# Patient Record
Sex: MALE | Race: BLACK OR AFRICAN AMERICAN | Employment: FULL TIME | URBAN - METROPOLITAN AREA
[De-identification: names, ages, dates, MRNs, and addresses within clinical notes are randomized per-mention and may not be internally consistent; named-entity substitution may affect disease eponyms.]

---

## 2022-12-03 ENCOUNTER — APPOINTMENT (OUTPATIENT)
Dept: GENERAL RADIOLOGY | Age: 52
End: 2022-12-03
Attending: EMERGENCY MEDICINE

## 2022-12-03 ENCOUNTER — HOSPITAL ENCOUNTER (EMERGENCY)
Age: 52
Discharge: HOME | End: 2022-12-03
Attending: EMERGENCY MEDICINE

## 2022-12-03 VITALS
SYSTOLIC BLOOD PRESSURE: 186 MMHG | TEMPERATURE: 97.4 F | HEART RATE: 91 BPM | OXYGEN SATURATION: 99 % | DIASTOLIC BLOOD PRESSURE: 117 MMHG | RESPIRATION RATE: 16 BRPM
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
## 2022-12-03 DIAGNOSIS — T07.XXXA MULTIPLE CONTUSIONS: ICD-10-CM

## 2022-12-03 PROCEDURE — 99283 EMERGENCY DEPT VISIT LOW MDM: CPT

## 2022-12-03 PROCEDURE — 71101 X-RAY EXAM UNILAT RIBS/CHEST: CPT

## 2022-12-03 PROCEDURE — 73562 X-RAY EXAM OF KNEE 3: CPT

## 2022-12-03 PROCEDURE — 74011250637 HC RX REV CODE- 250/637: Performed by: EMERGENCY MEDICINE

## 2022-12-03 RX ORDER — IBUPROFEN 800 MG/1
800 TABLET ORAL
Qty: 20 TABLET | Refills: 0 | Status: SHIPPED | OUTPATIENT
Start: 2022-12-03 | End: 2022-12-10

## 2022-12-03 RX ORDER — HYDROCODONE BITARTRATE AND ACETAMINOPHEN 5; 325 MG/1; MG/1
1 TABLET ORAL ONCE
Status: COMPLETED | OUTPATIENT
Start: 2022-12-03 | End: 2022-12-03

## 2022-12-03 RX ORDER — HYDROCODONE BITARTRATE AND ACETAMINOPHEN 5; 325 MG/1; MG/1
1 TABLET ORAL
Qty: 10 TABLET | Refills: 0 | Status: SHIPPED | OUTPATIENT
Start: 2022-12-03 | End: 2022-12-06

## 2022-12-03 RX ORDER — IBUPROFEN 400 MG/1
800 TABLET ORAL ONCE
Status: COMPLETED | OUTPATIENT
Start: 2022-12-03 | End: 2022-12-03

## 2022-12-03 RX ADMIN — IBUPROFEN 800 MG: 400 TABLET, FILM COATED ORAL at 09:08

## 2022-12-03 RX ADMIN — HYDROCODONE BITARTRATE AND ACETAMINOPHEN 1 TABLET: 5; 325 TABLET ORAL at 09:08
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
## 2022-12-03 NOTE — Clinical Note
[address] [place] [address] Dr GEN[facility] EMERGENCY DEPT  [id number] [address] [phone]    Work/School Note    Date: 12/3/2022    To Whom It May concern:    Libia Short was seen and treated today in the emergency room by the following provider(s):  Attending Provider: Marjorie Villanueva MD.      Libia Short is excused from work/school on 12/03/22 and 12/04/22. He is medically clear to return to work/school on 12/5/2022.        Sincerely,          Francine Velasquez MD
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
## 2023-02-06 ENCOUNTER — APPOINTMENT (OUTPATIENT)
Dept: CT IMAGING | Age: 53
DRG: 308 | End: 2023-02-06
Attending: EMERGENCY MEDICINE
Payer: COMMERCIAL

## 2023-02-06 ENCOUNTER — HOSPITAL ENCOUNTER (INPATIENT)
Age: 53
LOS: 2 days | Discharge: HOME | DRG: 308 | End: 2023-02-08
Attending: EMERGENCY MEDICINE | Admitting: FAMILY MEDICINE
Payer: COMMERCIAL

## 2023-02-06 ENCOUNTER — APPOINTMENT (OUTPATIENT)
Dept: GENERAL RADIOLOGY | Age: 53
DRG: 308 | End: 2023-02-06
Attending: EMERGENCY MEDICINE
Payer: COMMERCIAL

## 2023-02-06 ENCOUNTER — OFFICE VISIT (OUTPATIENT)
Dept: URGENT CARE | Age: 53
End: 2023-02-06
Payer: COMMERCIAL

## 2023-02-06 VITALS
HEART RATE: 151 BPM | BODY MASS INDEX: 33 KG/M2 | OXYGEN SATURATION: 97 % | DIASTOLIC BLOOD PRESSURE: 111 MMHG | SYSTOLIC BLOOD PRESSURE: 145 MMHG | TEMPERATURE: 97.9 F | WEIGHT: 249 LBS | RESPIRATION RATE: 16 BRPM | HEIGHT: 73 IN

## 2023-02-06 DIAGNOSIS — I48.92 ATRIAL FLUTTER, UNSPECIFIED TYPE (HCC): Primary | ICD-10-CM

## 2023-02-06 DIAGNOSIS — I48.92 ATRIAL FLUTTER BY ELECTROCARDIOGRAM (HCC): Primary | ICD-10-CM

## 2023-02-06 LAB
ALBUMIN SERPL-MCNC: 4.5 G/DL (ref 3.5–5)
ALBUMIN/GLOB SERPL: 1.2 (ref 1.1–2.2)
ALP SERPL-CCNC: 64 U/L (ref 45–117)
ALT SERPL-CCNC: 96 U/L (ref 12–78)
AMPHET UR QL SCN: NEGATIVE
ANION GAP SERPL CALC-SCNC: 6 MMOL/L (ref 5–15)
AST SERPL-CCNC: 64 U/L (ref 15–37)
BARBITURATES UR QL SCN: NEGATIVE
BASOPHILS # BLD: 0 K/UL (ref 0–0.1)
BASOPHILS NFR BLD: 1 % (ref 0–1)
BENZODIAZ UR QL: NEGATIVE
BILIRUB SERPL-MCNC: 0.8 MG/DL (ref 0.2–1)
BNP SERPL-MCNC: 447 PG/ML
BUN SERPL-MCNC: 22 MG/DL (ref 6–20)
BUN/CREAT SERPL: 16 (ref 12–20)
CALCIUM SERPL-MCNC: 9.7 MG/DL (ref 8.5–10.1)
CANNABINOIDS UR QL SCN: NEGATIVE
CHLORIDE SERPL-SCNC: 109 MMOL/L (ref 97–108)
CO2 SERPL-SCNC: 24 MMOL/L (ref 21–32)
COCAINE UR QL SCN: NEGATIVE
COMMENT, HOLDF: NORMAL
COMMENT, HOLDF: NORMAL
CREAT SERPL-MCNC: 1.37 MG/DL (ref 0.7–1.3)
D DIMER PPP FEU-MCNC: 0.21 MG/L FEU (ref 0–0.65)
DIFFERENTIAL METHOD BLD: ABNORMAL
DRUG SCRN COMMENT,DRGCM: NORMAL
EOSINOPHIL # BLD: 0.1 K/UL (ref 0–0.4)
EOSINOPHIL NFR BLD: 3 % (ref 0–7)
ERYTHROCYTE [DISTWIDTH] IN BLOOD BY AUTOMATED COUNT: 14.5 % (ref 11.5–14.5)
GLOBULIN SER CALC-MCNC: 3.7 G/DL (ref 2–4)
GLUCOSE SERPL-MCNC: 131 MG/DL (ref 65–100)
HCT VFR BLD AUTO: 49.8 % (ref 36.6–50.3)
HGB BLD-MCNC: 17.2 G/DL (ref 12.1–17)
IMM GRANULOCYTES # BLD AUTO: 0 K/UL (ref 0–0.04)
IMM GRANULOCYTES NFR BLD AUTO: 0 % (ref 0–0.5)
LYMPHOCYTES # BLD: 1.3 K/UL (ref 0.8–3.5)
LYMPHOCYTES NFR BLD: 29 % (ref 12–49)
MAGNESIUM SERPL-MCNC: 2.2 MG/DL (ref 1.6–2.4)
MCH RBC QN AUTO: 29 PG (ref 26–34)
MCHC RBC AUTO-ENTMCNC: 34.5 G/DL (ref 30–36.5)
MCV RBC AUTO: 84 FL (ref 80–99)
METHADONE UR QL: NEGATIVE
MONOCYTES # BLD: 0.5 K/UL (ref 0–1)
MONOCYTES NFR BLD: 11 % (ref 5–13)
NEUTS SEG # BLD: 2.5 K/UL (ref 1.8–8)
NEUTS SEG NFR BLD: 56 % (ref 32–75)
NRBC # BLD: 0 K/UL (ref 0–0.01)
NRBC BLD-RTO: 0 PER 100 WBC
OPIATES UR QL: NEGATIVE
PCP UR QL: NEGATIVE
PLATELET # BLD AUTO: 161 K/UL (ref 150–400)
PMV BLD AUTO: 10.9 FL (ref 8.9–12.9)
POTASSIUM SERPL-SCNC: 3.6 MMOL/L (ref 3.5–5.1)
PROT SERPL-MCNC: 8.2 G/DL (ref 6.4–8.2)
RBC # BLD AUTO: 5.93 M/UL (ref 4.1–5.7)
SAMPLES BEING HELD,HOLD: NORMAL
SAMPLES BEING HELD,HOLD: NORMAL
SODIUM SERPL-SCNC: 139 MMOL/L (ref 136–145)
TROPONIN I SERPL HS-MCNC: 73 NG/L (ref 0–76)
TROPONIN I SERPL HS-MCNC: 84 NG/L (ref 0–76)
TSH SERPL DL<=0.05 MIU/L-ACNC: 2.74 UIU/ML (ref 0.36–3.74)
UR CULT HOLD, URHOLD: NORMAL
WBC # BLD AUTO: 4.5 K/UL (ref 4.1–11.1)

## 2023-02-06 PROCEDURE — 74011250636 HC RX REV CODE- 250/636: Performed by: EMERGENCY MEDICINE

## 2023-02-06 PROCEDURE — 85025 COMPLETE CBC W/AUTO DIFF WBC: CPT

## 2023-02-06 PROCEDURE — 74011000258 HC RX REV CODE- 258: Performed by: FAMILY MEDICINE

## 2023-02-06 PROCEDURE — 74011000636 HC RX REV CODE- 636: Performed by: RADIOLOGY

## 2023-02-06 PROCEDURE — 99285 EMERGENCY DEPT VISIT HI MDM: CPT

## 2023-02-06 PROCEDURE — 65660000001 HC RM ICU INTERMED STEPDOWN

## 2023-02-06 PROCEDURE — 74011250637 HC RX REV CODE- 250/637: Performed by: FAMILY MEDICINE

## 2023-02-06 PROCEDURE — 84443 ASSAY THYROID STIM HORMONE: CPT

## 2023-02-06 PROCEDURE — 83735 ASSAY OF MAGNESIUM: CPT

## 2023-02-06 PROCEDURE — 84484 ASSAY OF TROPONIN QUANT: CPT

## 2023-02-06 PROCEDURE — 85379 FIBRIN DEGRADATION QUANT: CPT

## 2023-02-06 PROCEDURE — 80307 DRUG TEST PRSMV CHEM ANLYZR: CPT

## 2023-02-06 PROCEDURE — 36415 COLL VENOUS BLD VENIPUNCTURE: CPT

## 2023-02-06 PROCEDURE — 74011000250 HC RX REV CODE- 250: Performed by: FAMILY MEDICINE

## 2023-02-06 PROCEDURE — 83880 ASSAY OF NATRIURETIC PEPTIDE: CPT

## 2023-02-06 PROCEDURE — 74011000250 HC RX REV CODE- 250: Performed by: INTERNAL MEDICINE

## 2023-02-06 PROCEDURE — 93005 ELECTROCARDIOGRAM TRACING: CPT

## 2023-02-06 PROCEDURE — 74011250636 HC RX REV CODE- 250/636: Performed by: INTERNAL MEDICINE

## 2023-02-06 PROCEDURE — 93000 ELECTROCARDIOGRAM COMPLETE: CPT | Performed by: FAMILY MEDICINE

## 2023-02-06 PROCEDURE — 71046 X-RAY EXAM CHEST 2 VIEWS: CPT

## 2023-02-06 PROCEDURE — 99204 OFFICE O/P NEW MOD 45 MIN: CPT | Performed by: FAMILY MEDICINE

## 2023-02-06 PROCEDURE — 74011000258 HC RX REV CODE- 258: Performed by: INTERNAL MEDICINE

## 2023-02-06 PROCEDURE — 71275 CT ANGIOGRAPHY CHEST: CPT

## 2023-02-06 PROCEDURE — 80053 COMPREHEN METABOLIC PANEL: CPT

## 2023-02-06 RX ORDER — SODIUM CHLORIDE 0.9 % (FLUSH) 0.9 %
5-40 SYRINGE (ML) INJECTION EVERY 8 HOURS
Status: DISCONTINUED | OUTPATIENT
Start: 2023-02-06 | End: 2023-02-08 | Stop reason: HOSPADM

## 2023-02-06 RX ORDER — POTASSIUM CHLORIDE 750 MG/1
20 TABLET, FILM COATED, EXTENDED RELEASE ORAL
Status: COMPLETED | OUTPATIENT
Start: 2023-02-06 | End: 2023-02-06

## 2023-02-06 RX ORDER — ONDANSETRON 4 MG/1
4 TABLET, ORALLY DISINTEGRATING ORAL
Status: DISCONTINUED | OUTPATIENT
Start: 2023-02-06 | End: 2023-02-08 | Stop reason: HOSPADM

## 2023-02-06 RX ORDER — ACETAMINOPHEN 650 MG/1
650 SUPPOSITORY RECTAL
Status: DISCONTINUED | OUTPATIENT
Start: 2023-02-06 | End: 2023-02-08 | Stop reason: HOSPADM

## 2023-02-06 RX ORDER — LISINOPRIL 10 MG/1
10 TABLET ORAL DAILY
COMMUNITY
End: 2023-02-08

## 2023-02-06 RX ORDER — ONDANSETRON 2 MG/ML
4 INJECTION INTRAMUSCULAR; INTRAVENOUS
Status: DISCONTINUED | OUTPATIENT
Start: 2023-02-06 | End: 2023-02-08 | Stop reason: HOSPADM

## 2023-02-06 RX ORDER — POLYETHYLENE GLYCOL 3350 17 G/17G
17 POWDER, FOR SOLUTION ORAL DAILY PRN
Status: DISCONTINUED | OUTPATIENT
Start: 2023-02-06 | End: 2023-02-08 | Stop reason: HOSPADM

## 2023-02-06 RX ORDER — ENOXAPARIN SODIUM 100 MG/ML
1 INJECTION SUBCUTANEOUS EVERY 12 HOURS
Status: DISCONTINUED | OUTPATIENT
Start: 2023-02-06 | End: 2023-02-07

## 2023-02-06 RX ORDER — LISINOPRIL 10 MG/1
10 TABLET ORAL DAILY
Status: DISCONTINUED | OUTPATIENT
Start: 2023-02-07 | End: 2023-02-08 | Stop reason: HOSPADM

## 2023-02-06 RX ORDER — ACETAMINOPHEN 325 MG/1
650 TABLET ORAL
Status: DISCONTINUED | OUTPATIENT
Start: 2023-02-06 | End: 2023-02-08 | Stop reason: HOSPADM

## 2023-02-06 RX ORDER — SODIUM CHLORIDE 0.9 % (FLUSH) 0.9 %
5-40 SYRINGE (ML) INJECTION AS NEEDED
Status: DISCONTINUED | OUTPATIENT
Start: 2023-02-06 | End: 2023-02-08 | Stop reason: HOSPADM

## 2023-02-06 RX ORDER — FUROSEMIDE 10 MG/ML
40 INJECTION INTRAMUSCULAR; INTRAVENOUS ONCE
Status: COMPLETED | OUTPATIENT
Start: 2023-02-06 | End: 2023-02-06

## 2023-02-06 RX ORDER — ALBUTEROL SULFATE 90 UG/1
2 AEROSOL, METERED RESPIRATORY (INHALATION)
COMMUNITY
Start: 2022-12-06 | End: 2023-12-06

## 2023-02-06 RX ADMIN — FUROSEMIDE 40 MG: 10 INJECTION, SOLUTION INTRAMUSCULAR; INTRAVENOUS at 16:03

## 2023-02-06 RX ADMIN — SODIUM CHLORIDE 1000 ML: 900 INJECTION, SOLUTION INTRAVENOUS at 11:10

## 2023-02-06 RX ADMIN — DILTIAZEM HYDROCHLORIDE 5 MG/HR: 5 INJECTION INTRAVENOUS at 22:30

## 2023-02-06 RX ADMIN — IOPAMIDOL 80 ML: 755 INJECTION, SOLUTION INTRAVENOUS at 11:52

## 2023-02-06 RX ADMIN — ACETAMINOPHEN 650 MG: 325 TABLET ORAL at 21:45

## 2023-02-06 RX ADMIN — DILTIAZEM HYDROCHLORIDE 2.5 MG/HR: 5 INJECTION INTRAVENOUS at 16:27

## 2023-02-06 RX ADMIN — POTASSIUM CHLORIDE 20 MEQ: 750 TABLET, FILM COATED, EXTENDED RELEASE ORAL at 16:04

## 2023-02-06 RX ADMIN — ENOXAPARIN SODIUM 120 MG: 100 INJECTION SUBCUTANEOUS at 16:04

## 2023-02-06 NOTE — PROGRESS NOTES
HISTORY OF PRESENT ILLNESS  Regina Veliz is a 46 y.o. male. Patient presents with 3 days of chest tightness shortness of breath. He says that he was washing his car yesterday when this started. He used his Apple Watch to tell him his HR was over 100 bpm, but unsure of what number exactly. He says that with rest he came back down. Waking up this morning he was experiencing the same symptoms. Of note, he was diagnosed with a thoracic aortic aneurysm in 2018, but he also says that he was told he does not have this problem as of 2 years ago. He underwent stress test in 2018, with normal results. He reports that he has never had a catheterization or seen a cardiologist outpatient. Review of Systems   Constitutional:  Negative for chills, diaphoresis and fever. HENT:  Negative for congestion and sinus pain. Respiratory:  Positive for cough and shortness of breath. Negative for sputum production and wheezing. Cardiovascular:  Positive for chest pain. Negative for palpitations, orthopnea, leg swelling and PND. Gastrointestinal:  Negative for nausea and vomiting. Physical Exam  Vitals and nursing note reviewed. Constitutional:       General: He is not in acute distress. Appearance: Normal appearance. He is obese. HENT:      Head: Normocephalic and atraumatic. Cardiovascular:      Rate and Rhythm: Regular rhythm. Tachycardia present. Pulses: Normal pulses. Heart sounds: Normal heart sounds. No murmur heard. Pulmonary:      Effort: Pulmonary effort is normal. No respiratory distress. Breath sounds: Normal breath sounds. Abdominal:      General: Abdomen is flat. Palpations: Abdomen is soft. Tenderness: There is no abdominal tenderness. Musculoskeletal:      Right lower leg: No edema. Left lower leg: No edema. Neurological:      Mental Status: He is alert. EKG shows a flutter with 2:1 conduction at a rate of 151 bpm.    ASSESSMENT and PLAN  1. Atrial flutter by electrocardiogram ([facility] [address]  A flutter with RVR on EKG x2. Pulse did not come down at any point during visit.  Recommended that patient proceed to ER immediately for stabilization and evaluation.  - AMB POC EKG ROUTINE W/ 12 LEADS, INTER & REP

## 2023-02-06 NOTE — CONSULTS
Cardiology Consult Note    CC: CP/palpitations  Reason for consult:  Siddhartha Parker with RVR    Requesting MD:  Dr. Sharma Headings     Subjective:      Date of  Admission: 2/6/2023 11:40 AM     Admission type:Emergency    Mary Ann Segovia is a 46 y.o. male was seen in ER for tachycardia. He states he has had elevated HR and sensation of palpitation with just slight activity. He has felt like this over last two weeks. With tachycardia he has felt tired and easily fatigued. He also feels like he needs to take a deep breath and noted abdominal distention. He feels like his lungs are congested as well. He denies any prior history of PAF, CHF, Mi, CAD, or stent. He has HTN but he is not taking regimen all the time. He just found a PCP whom he will see end of this month. There are no problems to display for this patient. PMhx; HTN    Shx; negative for smoking or ETOH; he works at [employer]    Fhx; positive for HTN in mom     No past surgical history on file. Allergies   Allergen Reactions    Shellfish Derived Anaphylaxis      No family history on file. Current Facility-Administered Medications   Medication Dose Route Frequency    furosemide (LASIX) injection 40 mg  40 mg IntraVENous ONCE    enoxaparin (LOVENOX) injection 120 mg  1 mg/kg SubCUTAneous Q12H    dilTIAZem (CARDIZEM) 125 mg in dextrose 5% 125 mL infusion  0-15 mg/hr IntraVENous TITRATE     Current Outpatient Medications   Medication Sig    albuterol (PROVENTIL HFA, VENTOLIN HFA, PROAIR HFA) 90 mcg/actuation inhaler Take 2 Puffs by inhalation every four (4) hours as needed. lisinopriL (PRINIVIL, ZESTRIL) 10 mg tablet Take 10 mg by mouth daily. Prior to Admission Medications:  Prior to Admission medications    Medication Sig Start Date End Date Taking? Authorizing Provider   albuterol (PROVENTIL HFA, VENTOLIN HFA, PROAIR HFA) 90 mcg/actuation inhaler Take 2 Puffs by inhalation every four (4) hours as needed.  12/6/22 12/6/23  Provider, Historical lisinopriL (PRINIVIL, ZESTRIL) 10 mg tablet Take 10 mg by mouth daily. Provider, Historical        Review of Symptoms:  Except as noted in HPI, patient denies recent fever or chills, nausea, vomiting, diarrhea, hemoptysis, hematemesis, dysuria, myalgias, focal neurologic symptoms, ecchymosis, angioedema, odynophagia, dysphagia, sore throat, earache,rash, melena, hematochezia, depression, GERD, cold intolerance, petechia, bleeding gums, or significant weight loss. A comprehensive review of systems was negative except for that written in the HPI. Subjective:    24 hr VS reviewed, overall VSSAF  Temp (24hrs), Av.9 °F (36.6 °C), Min:97.9 °F (36.6 °C), Max:97.9 °F (36.6 °C)    Patient Vitals for the past 8 hrs:   Pulse   23 1501 (!) 112   23 1457 (!) 138   23 1447 (!) 149   23 1443 (!) 127   23 1422 (!) 148   23 1352 (!) 151   23 1307 (!) 154   23 1222 (!) 153   23 1215 (!) 151   23 0943 (!) 154    Patient Vitals for the past 8 hrs:   Resp   23 1501 (!) 5   23 1457 24   23 1447 13   23 1443 12   23 1422 13   23 1352 20   23 1307 20   23 1222 20   23 1215 24   23 0943 20    Patient Vitals for the past 8 hrs:   BP   23 1457 (!) 121/101   23 1447 (!) 126/100   23 1443 (!) 123/90   23 1222 111/87   23 1215 (!) 140/108   23 0943 (!) 165/112          Intake/Output Summary (Last 24 hours) at 2023 1516  Last data filed at 2023 1423  Gross per 24 hour   Intake 1000 ml   Output --   Net 1000 ml         Physical Exam (complete single organ system exam)      Visit Vitals  BP (!) 121/101   Pulse (!) 112   Temp 97.9 °F (36.6 °C)   Resp (!) 5   SpO2 97%     General Appearance:  Well developed, well nourished,alert and oriented x 3, and individual in no acute distress. Ears/Nose/Mouth/Throat:   Hearing grossly normal.         Neck: Supple.    Chest:   Lungs with slight rales   Cardiovascular:  Regular rate and rhythm, tachycardic, S1, S2 normal, no murmur. Abdomen:   Soft, non-tender, bowel sounds are active. Extremities: No edema bilaterally. Skin: Warm and dry. Cardiographics    Telemetry: Aflutter with RVR  ECG: Aflutter with 2:1 AVB  Echocardiogram: Not done    Labs:   Recent Results (from the past 24 hour(s))   CBC WITH AUTOMATED DIFF    Collection Time: 02/06/23  9:58 AM   Result Value Ref Range    WBC 4.5 4.1 - 11.1 K/uL    RBC 5.93 (H) 4.10 - 5.70 M/uL    HGB 17.2 (H) 12.1 - 17.0 g/dL    HCT 49.8 36.6 - 50.3 %    MCV 84.0 80.0 - 99.0 FL    MCH 29.0 26.0 - 34.0 PG    MCHC 34.5 30.0 - 36.5 g/dL    RDW 14.5 11.5 - 14.5 %    PLATELET 324 667 - 437 K/uL    MPV 10.9 8.9 - 12.9 FL    NRBC 0.0 0  WBC    ABSOLUTE NRBC 0.00 0.00 - 0.01 K/uL    NEUTROPHILS 56 32 - 75 %    LYMPHOCYTES 29 12 - 49 %    MONOCYTES 11 5 - 13 %    EOSINOPHILS 3 0 - 7 %    BASOPHILS 1 0 - 1 %    IMMATURE GRANULOCYTES 0 0.0 - 0.5 %    ABS. NEUTROPHILS 2.5 1.8 - 8.0 K/UL    ABS. LYMPHOCYTES 1.3 0.8 - 3.5 K/UL    ABS. MONOCYTES 0.5 0.0 - 1.0 K/UL    ABS. EOSINOPHILS 0.1 0.0 - 0.4 K/UL    ABS. BASOPHILS 0.0 0.0 - 0.1 K/UL    ABS. IMM. GRANS. 0.0 0.00 - 0.04 K/UL    DF AUTOMATED     METABOLIC PANEL, COMPREHENSIVE    Collection Time: 02/06/23  9:58 AM   Result Value Ref Range    Sodium 139 136 - 145 mmol/L    Potassium 3.6 3.5 - 5.1 mmol/L    Chloride 109 (H) 97 - 108 mmol/L    CO2 24 21 - 32 mmol/L    Anion gap 6 5 - 15 mmol/L    Glucose 131 (H) 65 - 100 mg/dL    BUN 22 (H) 6 - 20 MG/DL    Creatinine 1.37 (H) 0.70 - 1.30 MG/DL    BUN/Creatinine ratio 16 12 - 20      eGFR >60 >60 ml/min/1.73m2    Calcium 9.7 8.5 - 10.1 MG/DL    Bilirubin, total 0.8 0.2 - 1.0 MG/DL    ALT (SGPT) 96 (H) 12 - 78 U/L    AST (SGOT) 64 (H) 15 - 37 U/L    Alk.  phosphatase 64 45 - 117 U/L    Protein, total 8.2 6.4 - 8.2 g/dL    Albumin 4.5 3.5 - 5.0 g/dL    Globulin 3.7 2.0 - 4.0 g/dL    A-G Ratio 1. 2 1.1 - 2.2     TROPONIN-HIGH SENSITIVITY    Collection Time: 02/06/23  9:58 AM   Result Value Ref Range    Troponin-High Sensitivity 73 0 - 76 ng/L   MAGNESIUM    Collection Time: 02/06/23  9:58 AM   Result Value Ref Range    Magnesium 2.2 1.6 - 2.4 mg/dL   SAMPLES BEING HELD    Collection Time: 02/06/23  9:58 AM   Result Value Ref Range    SAMPLES BEING HELD 1red     COMMENT        Add-on orders for these samples will be processed based on acceptable specimen integrity and analyte stability, which may vary by analyte. NT-PRO BNP    Collection Time: 02/06/23  9:58 AM   Result Value Ref Range    NT pro- (H) <125 PG/ML   TSH 3RD GENERATION    Collection Time: 02/06/23  9:58 AM   Result Value Ref Range    TSH 2.74 0.36 - 3.74 uIU/mL   D DIMER    Collection Time: 02/06/23  9:58 AM   Result Value Ref Range    D-dimer 0.21 0.00 - 0.65 mg/L FEU   TROPONIN-HIGH SENSITIVITY    Collection Time: 02/06/23 12:33 PM   Result Value Ref Range    Troponin-High Sensitivity 84 (H) 0 - 76 ng/L   SAMPLES BEING HELD    Collection Time: 02/06/23 12:47 PM   Result Value Ref Range    SAMPLES BEING HELD 1PST     COMMENT        Add-on orders for these samples will be processed based on acceptable specimen integrity and analyte stability, which may vary by analyte. URINE CULTURE HOLD SAMPLE    Collection Time: 02/06/23 12:49 PM    Specimen: Urine   Result Value Ref Range    Urine culture hold        Urine on hold in Microbiology dept for 2 days. If unpreserved urine is submitted, it cannot be used for addtional testing after 24 hours, recollection will be required.    DRUG SCREEN, URINE    Collection Time: 02/06/23 12:49 PM   Result Value Ref Range    AMPHETAMINES Negative NEG      BARBITURATES Negative NEG      BENZODIAZEPINES Negative NEG      COCAINE Negative NEG      METHADONE Negative NEG      OPIATES Negative NEG      PCP(PHENCYCLIDINE) Negative NEG      THC (TH-CANNABINOL) Negative NEG      Drug screen comment (NOTE) Assessment:     Assessment:   Tachycardia;  Aflutter with 2:1 AVB  Aflutter; duration for two weeks  SOB; he is mild acute diastolic HF due to tachycardia  JHTN; not controlled      Plan:   Tele  IV Cardizem  Lovenox  Echo   Serial enzyme    Susie Collier MD

## 2023-02-06 NOTE — H&P
History and Physical    Date of Service:  2/6/2023  Primary Care Provider: None  Source of information: The patient and Chart review    Chief Complaint: Chest Pain      History of Presenting Illness:   Bryanna Castillo is a 46 y.o. male who presents with shortness of breath as well as chest pain for about a week. Patient was also noted to have sensation of palpitation even with slight activities. Patient was recently seen at an urgent care with similar symptoms several weeks ago and was diagnosed with bronchitis and treated. Patient with ongoing symptoms and was evaluated by PCP today, and subsequently sent to the emergency room. Patient subsequently presented to the emergency room. Patient with normal first troponin is slightly elevated second troponin. On further evaluation, patient was noted to be in a flutter with RVR. CTA of the chest was negative. Patient received one-time dose of Lasix as well as started on Cardizem drip. Cardiology has evaluated the patient admitted for further evaluation management. The patient denies any headache, blurry vision, sore throat, trouble swallowing, trouble with speech, chest pain, SOB, cough, fever, chills, N/V/D, abd pain, urinary symptoms, constipation, recent travels, sick contacts, focal or generalized neurological symptoms, falls, injuries, rashes, contact with COVID-19 diagnosed patients, hematemesis, melena, hemoptysis, hematuria, rashes, denies starting any new medications and denies any other concerns or problems besides as mentioned above. REVIEW OF SYSTEMS:  A comprehensive review of systems was negative except for that written in the History of Present Illness. No past medical history on file. No past surgical history on file. Prior to Admission medications    Medication Sig Start Date End Date Taking?  Authorizing Provider   albuterol (PROVENTIL HFA, VENTOLIN HFA, PROAIR HFA) 90 mcg/actuation inhaler Take 2 Puffs by inhalation every four (4) hours as needed. 12/6/22 12/6/23  Provider, Historical   lisinopriL (PRINIVIL, ZESTRIL) 10 mg tablet Take 10 mg by mouth daily. Provider, Historical     Allergies   Allergen Reactions    Shellfish Derived Anaphylaxis      No family history on file. Social History:  reports that he has never smoked. He has never used smokeless tobacco.   Social Determinants of Health     Tobacco Use: Low Risk     Smoking Tobacco Use: Never    Smokeless Tobacco Use: Never    Passive Exposure: Not on file   Alcohol Use: Not on file   Financial Resource Strain: Not on file   Food Insecurity: Not on file   Transportation Needs: Not on file   Physical Activity: Not on file   Stress: Not on file   Social Connections: Not on file   Intimate Partner Violence: Not on file   Depression: Not on file   Housing Stability: Not on file        Medications were reconciled to the best of my ability given all available resources at the time of admission. Route is PO if not otherwise noted. Family and social history were personally reviewed, all pertinent and relevant details are outlined as above.     Objective:   Visit Vitals  BP (!) 121/101   Pulse (!) 112   Temp 97.9 °F (36.6 °C)   Resp (!) 5   SpO2 97%      O2 Device: None (Room air)    PHYSICAL EXAM:   General: Alert x oriented x 3, awake, no acute distress,   HEENT: PEERL, EOMI, moist mucus membranes  Neck: Supple, no JVD, no meningeal signs  Chest: Clear to auscultation bilaterally   CVS: RRR, S1 S2 heard, no murmurs/rubs/gallops  Abd: Soft, non-tender, non-distended, +bowel sounds   Ext: No clubbing, no cyanosis, no edema  Neuro/Psych: Pleasant mood and affect, CN 2-12 grossly intact, sensory grossly within normal limit, Strength 5/5 in all extremities, DTR 1+ x 4  Cap refill: Brisk, less than 3 seconds  Pulses: 2+, symmetric in all extremities  Skin: Warm, dry, without rashes or lesions    Data Review:   I have independently reviewed and interpreted patient's lab and all other diagnostic data    Abnormal Labs Reviewed   CBC WITH AUTOMATED DIFF - Abnormal; Notable for the following components:       Result Value    RBC 5.93 (*)     HGB 17.2 (*)     All other components within normal limits   METABOLIC PANEL, COMPREHENSIVE - Abnormal; Notable for the following components:    Chloride 109 (*)     Glucose 131 (*)     BUN 22 (*)     Creatinine 1.37 (*)     ALT (SGPT) 96 (*)     AST (SGOT) 64 (*)     All other components within normal limits   NT-PRO BNP - Abnormal; Notable for the following components:    NT pro- (*)     All other components within normal limits   TROPONIN-HIGH SENSITIVITY - Abnormal; Notable for the following components:    Troponin-High Sensitivity 84 (*)     All other components within normal limits       All Micro Results       Procedure Component Value Units Date/Time    URINE CULTURE HOLD SAMPLE [[id number]] Collected: 02/06/23 1249    Order Status: Completed Specimen: Urine Updated: 02/06/23 1255     Urine culture hold       Urine on hold in Microbiology dept for 2 days. If unpreserved urine is submitted, it cannot be used for addtional testing after 24 hours, recollection will be required. IMAGING:   CTA CHEST W OR W WO CONT   Final Result   There is no pulmonary embolism. There is no aortic aneurysm or dissection. No acute intrathoracic process is identified. Incidental findings are as   described above. XR CHEST PA LAT   Final Result   No acute process. ECG/ECHO:    Results for orders placed or performed in visit on 02/06/23   AMB POC EKG ROUTINE W/ 12 LEADS, INTER & REP    Narrative    complete          Notes reviewed from all clinical/nonclinical/nursing services involved in patient's clinical care. Care coordination discussions were held with appropriate clinical/nonclinical/ nursing providers based on care coordination needs.      Assessment:   Given the patient's current clinical presentation, there is a high level of concern for decompensation if discharged from the emergency department. Complex decision making was performed, which includes reviewing the patient's available past medical records, laboratory results, and imaging studies. Active Problems:    Atrial flutter ([address] (2/6/2023)        Plan:     Atrial fibrillation with RVR  -New onset atrial fibrillation with RVR, will admit for further evaluation  -TSH within normal limits, optimize electrolytes  -Continue Cardizem drip, continue anticoagulation with Lovenox 1 mg/kg subcu every 12 hours  -Check echocardiogram, cardiology evaluating, consideration for ACE cardioversion pending clinical course    Mild elevated troponin likely demand ischemia  -This is likely demand ischemia in the setting of tachycardia  -Continue monitor, cardiology following    Hypertensive urgency  -Elevated blood pressure of 165/112 on presentation  -Cardizem drip has been started for a flutter RVR, resume lisinopril from home  -Monitor blood pressure    Abnormal creatinine  -No previous baseline renal function  -Monitor    Dyspnea  -This is likely induced by tachycardia  -CTA negative for PE or other acute pathology and patient is not hypoxic    Estimated length of stay is 2 midnights. DIET: No diet orders on file   ISOLATION PRECAUTIONS: There are currently no Active Isolations  CODE STATUS: No Order   DVT PROPHYLAXIS: Lovenox  FUNCTIONAL STATUS PRIOR TO HOSPITALIZATION: Fully active and ambulatory; able to carry on all self-care without restriction. Ambulatory status/function: By self   EARLY MOBILITY ASSESSMENT: Recommend routine ambulation while hospitalized with the assistance of nursing staff  ANTICIPATED DISCHARGE: 24-48 hours. ANTICIPATED DISPOSITION: Home  EMERGENCY CONTACT/SURROGATE DECISION MAKER:     CRITICAL CARE WAS PERFORMED FOR THIS ENCOUNTER: YES.  I had a face to face encounter with the patient, reviewed and interpreted patient data including clinical events, labs, images, vital signs, I/O's, and examined patient. I have discussed the case and the plan and management of the patient's care with the consulting services, the bedside nurses and necessary ancillary providers. This patient has a high probability of imminent, clinically significant deterioration, which requires the highest level of preparedness to intervene urgently. I participated in the decision-making and personally managed or directed the management of the following life and organ supporting interventions that required my frequent assessment to treat or prevent imminent deterioration. I personally spent 60 minutes of critical care time. This is time spent at this critically ill patient's bedside actively involved in patient care as well as the coordination of care and discussions with the patient's family. This does not include any procedural time which has been billed separately. Signed By: Tere Becker MD     February 6, 2023         Please note that this dictation may have been completed with Dragon, the computer voice recognition software. Quite often unanticipated grammatical, syntax, homophones, and other interpretive errors are inadvertently transcribed by the computer software. Please disregard these errors. Please excuse any errors that have escaped final proofreading.

## 2023-02-06 NOTE — ED PROVIDER NOTES
The history is provided by the patient. No  was used. Shortness of Breath  This is a recurrent problem. The current episode started more than 2 days ago. The problem has not changed since onset. Associated symptoms include cough. Pertinent negatives include no fever, no headaches, no coryza, no rhinorrhea, no sore throat, no swollen glands, no ear pain, no neck pain, no sputum production, no hemoptysis, no wheezing, no PND, no orthopnea, no chest pain, no syncope, no vomiting, no abdominal pain, no rash, no leg pain, no leg swelling and no claudication. He has tried nothing for the symptoms. The treatment provided no relief. He has had No prior hospitalizations. He has had No prior ED visits. He has had No prior ICU admissions. Associated medical issues do not include asthma, COPD, pneumonia, chronic lung disease, PE, CAD, heart failure, past MI, DVT or recent surgery. No past medical history on file. No past surgical history on file. No family history on file.     Social History     Socioeconomic History    Marital status:      Spouse name: Not on file    Number of children: Not on file    Years of education: Not on file    Highest education level: Not on file   Occupational History    Not on file   Tobacco Use    Smoking status: Never    Smokeless tobacco: Never   Substance and Sexual Activity    Alcohol use: Not on file    Drug use: Not on file    Sexual activity: Not on file   Other Topics Concern    Not on file   Social History Narrative    Not on file     Social Determinants of Health     Financial Resource Strain: Not on file   Food Insecurity: Not on file   Transportation Needs: Not on file   Physical Activity: Not on file   Stress: Not on file   Social Connections: Not on file   Intimate Partner Violence: Not on file   Housing Stability: Not on file         ALLERGIES: Shellfish derived    Review of Systems   Constitutional:  Negative for activity change, chills and fever.   HENT:  Negative for ear pain, nosebleeds, rhinorrhea, sore throat, trouble swallowing and voice change. Eyes:  Negative for visual disturbance. Respiratory:  Positive for cough and shortness of breath. Negative for hemoptysis, sputum production and wheezing. Cardiovascular:  Negative for chest pain, palpitations, orthopnea, claudication, leg swelling, syncope and PND. Gastrointestinal:  Negative for abdominal pain, constipation, diarrhea, nausea and vomiting. Endocrine: Positive for polydipsia and polyuria. Genitourinary:  Negative for difficulty urinating, dysuria, hematuria and urgency. Musculoskeletal:  Negative for back pain, neck pain and neck stiffness. Skin:  Negative for color change and rash. Allergic/Immunologic: Negative for immunocompromised state. Neurological:  Negative for dizziness, seizures, syncope, weakness, light-headedness, numbness and headaches. Psychiatric/Behavioral:  Negative for behavioral problems, confusion, hallucinations, self-injury and suicidal ideas. Vitals:    02/06/23 0943   BP: (!) 165/112   Pulse: (!) 154   Resp: 20   Temp: 97.9 °F (36.6 °C)   SpO2: 97%            Physical Exam  Vitals and nursing note reviewed. Constitutional:       General: He is not in acute distress. Appearance: He is well-developed. He is not diaphoretic. HENT:      Head: Normocephalic and atraumatic. Eyes:      Pupils: Pupils are equal, round, and reactive to light. Cardiovascular:      Rate and Rhythm: Regular rhythm. Tachycardia present. Heart sounds: Normal heart sounds. No murmur heard. No friction rub. No gallop. Pulmonary:      Effort: Pulmonary effort is normal. No respiratory distress. Breath sounds: Normal breath sounds. No wheezing. Abdominal:      General: Bowel sounds are normal. There is no distension. Palpations: Abdomen is soft. Tenderness: There is no abdominal tenderness. There is no guarding or rebound. Musculoskeletal:         General: Normal range of motion. Cervical back: Normal range of motion and neck supple. Skin:     General: Skin is warm. Findings: No rash. Neurological:      Mental Status: He is alert and oriented to person, place, and time. Psychiatric:         Behavior: Behavior normal.         Thought Content: Thought content normal.         Judgment: Judgment normal.        Medical Decision Making  Amount and/or Complexity of Data Reviewed  Labs: ordered. Radiology: ordered. ECG/medicine tests: ordered. Risk  Decision regarding hospitalization. This is a 78-year-old male with past medical history, review of systems, physical exam as above, presenting with complaints of exertional dyspnea. Patient states several days worth of symptoms, noting he has an associated cough, denies fever or chest pain. He states he had similar symptoms several weeks ago, was diagnosed with bronchitis and treated in urgent care. He endorses a history of hypertension, currently stating without primary care and untreated. He denies a history of heart disease, DVT or PE. He notes a family history of diabetes, endorses polyuria and polydipsia, states no previous blood glucose checks. He endorses regular alcohol use, denies tobacco or drug use. Physical exam is remarkable for a well-appearing middle-age male, in no acute distress noted to be hypertensive, afebrile, tachycardic, satting well on room air. He has clear breath sounds to auscultation, rapid regular heart rate, no significant JVD. Discussed with patient differential including hyperglycemia, dehydration, PE, ACS. Plan to obtain CMP, CBC, EKG, chest x-ray, cardiac enzymes, D-dimer, BMP, point-of-care glucose. We will reassess, and make a disposition, however the patient will likely require admission for further care and evaluation.     Procedures    Perfect Serve Consult for Admission  3:16 PM    ED Room Number: ER09/09  Patient Name and age:  Mary Ann Segovia 46 y.o.  male  Working Diagnosis:   1. Atrial flutter, unspecified type ([address]        COVID-19 Suspicion:  no  Sepsis present:  no  Reassessment needed: no  Code Status:  Full Code  Readmission: no  Isolation Requirements:  no  Recommended Level of Care:  telemetry  Department: [facility] Adult ED - [phone]  Admitting Provider: d/w Dr. Va Baird    3:17 PM  Patient d/w Dr. Diego Jordan (Cardiology), plan for [facility] + ACE cardioversion.

## 2023-02-06 NOTE — PATIENT INSTRUCTIONS
Called to confirm patient's appointment with Rachel Au NP on 5/7/2018 at 1:30 pm. No answer. Left voicemail message with appointment information.       Demetrius Tafton, [place], [address]

## 2023-02-07 ENCOUNTER — APPOINTMENT (OUTPATIENT)
Dept: NON INVASIVE DIAGNOSTICS | Age: 53
DRG: 308 | End: 2023-02-07
Attending: FAMILY MEDICINE
Payer: COMMERCIAL

## 2023-02-07 PROBLEM — I50.30 DIASTOLIC HEART FAILURE (HCC): Status: ACTIVE | Noted: 2023-02-07

## 2023-02-07 LAB
ANION GAP SERPL CALC-SCNC: 5 MMOL/L (ref 5–15)
ATRIAL RATE: 293 BPM
BUN SERPL-MCNC: 21 MG/DL (ref 6–20)
BUN/CREAT SERPL: 16 (ref 12–20)
CALCIUM SERPL-MCNC: 8.9 MG/DL (ref 8.5–10.1)
CALCULATED P AXIS, ECG09: -93 DEGREES
CALCULATED R AXIS, ECG10: 28 DEGREES
CALCULATED T AXIS, ECG11: -92 DEGREES
CHLORIDE SERPL-SCNC: 110 MMOL/L (ref 97–108)
CO2 SERPL-SCNC: 23 MMOL/L (ref 21–32)
CREAT SERPL-MCNC: 1.29 MG/DL (ref 0.7–1.3)
DIAGNOSIS, 93000: NORMAL
ECHO AO ROOT DIAM: 4 CM
ECHO AO ROOT INDEX: 1.69 CM/M2
ECHO AV AREA PEAK VELOCITY: 2.6 CM2
ECHO AV AREA/BSA PEAK VELOCITY: 1.1 CM2/M2
ECHO AV PEAK GRADIENT: 3 MMHG
ECHO AV PEAK VELOCITY: 0.9 M/S
ECHO AV VELOCITY RATIO: 0.78
ECHO EST RA PRESSURE: 10 MMHG
ECHO LA DIAMETER INDEX: 2.42 CM/M2
ECHO LA DIAMETER: 5.7 CM
ECHO LA TO AORTIC ROOT RATIO: 1.43
ECHO LA VOL 2C: 152 ML (ref 18–58)
ECHO LA VOL 4C: 145 ML (ref 18–58)
ECHO LA VOLUME AREA LENGTH: 153 ML
ECHO LA VOLUME INDEX A2C: 64 ML/M2 (ref 16–34)
ECHO LA VOLUME INDEX A4C: 61 ML/M2 (ref 16–34)
ECHO LA VOLUME INDEX AREA LENGTH: 65 ML/M2 (ref 16–34)
ECHO LV EJECTION FRACTION A2C: 33 %
ECHO LV EJECTION FRACTION A4C: 26 %
ECHO LV FRACTIONAL SHORTENING: 9 % (ref 28–44)
ECHO LV INTERNAL DIMENSION DIASTOLE INDEX: 2.37 CM/M2
ECHO LV INTERNAL DIMENSION DIASTOLIC: 5.6 CM (ref 4.2–5.9)
ECHO LV INTERNAL DIMENSION SYSTOLIC INDEX: 2.16 CM/M2
ECHO LV INTERNAL DIMENSION SYSTOLIC: 5.1 CM
ECHO LV IVSD: 1.4 CM (ref 0.6–1)
ECHO LV MASS 2D: 296.6 G (ref 88–224)
ECHO LV MASS INDEX 2D: 125.7 G/M2 (ref 49–115)
ECHO LV POSTERIOR WALL DIASTOLIC: 1.1 CM (ref 0.6–1)
ECHO LV RELATIVE WALL THICKNESS RATIO: 0.39
ECHO LVOT AREA: 3.5 CM2
ECHO LVOT DIAM: 2.1 CM
ECHO LVOT PEAK GRADIENT: 2 MMHG
ECHO LVOT PEAK VELOCITY: 0.7 M/S
ECHO MV E VELOCITY: 1.35 M/S
ECHO MV REGURGITANT ALIASING (NYQUIST) VELOCITY: 30 CM/S
ECHO MV REGURGITANT VELOCITY PISA: 5 M/S
ECHO MV REGURGITANT VTIA: 118.6 CM
ECHO PV MAX VELOCITY: 0.7 M/S
ECHO PV PEAK GRADIENT: 2 MMHG
ECHO RIGHT VENTRICULAR SYSTOLIC PRESSURE (RVSP): 42 MMHG
ECHO RV FREE WALL PEAK S': 9 CM/S
ECHO RV INTERNAL DIMENSION: 3.9 CM
ECHO RV TAPSE: 1.2 CM (ref 1.7–?)
ECHO TV REGURGITANT MAX VELOCITY: 2.81 M/S
ECHO TV REGURGITANT PEAK GRADIENT: 32 MMHG
GLUCOSE SERPL-MCNC: 106 MG/DL (ref 65–100)
POTASSIUM SERPL-SCNC: 3.5 MMOL/L (ref 3.5–5.1)
Q-T INTERVAL, ECG07: 394 MS
QRS DURATION, ECG06: 112 MS
QTC CALCULATION (BEZET), ECG08: 445 MS
SODIUM SERPL-SCNC: 138 MMOL/L (ref 136–145)
VENTRICULAR RATE, ECG03: 77 BPM

## 2023-02-07 PROCEDURE — 36415 COLL VENOUS BLD VENIPUNCTURE: CPT

## 2023-02-07 PROCEDURE — 74011250637 HC RX REV CODE- 250/637: Performed by: FAMILY MEDICINE

## 2023-02-07 PROCEDURE — 74011000250 HC RX REV CODE- 250: Performed by: FAMILY MEDICINE

## 2023-02-07 PROCEDURE — 74011250637 HC RX REV CODE- 250/637: Performed by: NURSE PRACTITIONER

## 2023-02-07 PROCEDURE — 74011000258 HC RX REV CODE- 258: Performed by: FAMILY MEDICINE

## 2023-02-07 PROCEDURE — 65660000001 HC RM ICU INTERMED STEPDOWN

## 2023-02-07 PROCEDURE — 74011250637 HC RX REV CODE- 250/637: Performed by: INTERNAL MEDICINE

## 2023-02-07 PROCEDURE — 74011250636 HC RX REV CODE- 250/636: Performed by: FAMILY MEDICINE

## 2023-02-07 PROCEDURE — 74011000258 HC RX REV CODE- 258: Performed by: HOSPITALIST

## 2023-02-07 PROCEDURE — 93005 ELECTROCARDIOGRAM TRACING: CPT

## 2023-02-07 PROCEDURE — 80048 BASIC METABOLIC PNL TOTAL CA: CPT

## 2023-02-07 PROCEDURE — 74011250636 HC RX REV CODE- 250/636: Performed by: HOSPITALIST

## 2023-02-07 PROCEDURE — 93306 TTE W/DOPPLER COMPLETE: CPT

## 2023-02-07 RX ORDER — HYDROXYZINE 25 MG/1
25 TABLET, FILM COATED ORAL
Status: DISCONTINUED | OUTPATIENT
Start: 2023-02-07 | End: 2023-02-08 | Stop reason: HOSPADM

## 2023-02-07 RX ORDER — ENOXAPARIN SODIUM 150 MG/ML
1 INJECTION SUBCUTANEOUS EVERY 12 HOURS
Status: DISCONTINUED | OUTPATIENT
Start: 2023-02-07 | End: 2023-02-08 | Stop reason: HOSPADM

## 2023-02-07 RX ORDER — TRAMADOL HYDROCHLORIDE 50 MG/1
50 TABLET ORAL
Status: DISCONTINUED | OUTPATIENT
Start: 2023-02-07 | End: 2023-02-08 | Stop reason: HOSPADM

## 2023-02-07 RX ORDER — ALPRAZOLAM 0.5 MG/1
0.5 TABLET ORAL
Status: DISCONTINUED | OUTPATIENT
Start: 2023-02-07 | End: 2023-02-08 | Stop reason: HOSPADM

## 2023-02-07 RX ORDER — METOPROLOL TARTRATE 25 MG/1
25 TABLET, FILM COATED ORAL EVERY 12 HOURS
Status: DISCONTINUED | OUTPATIENT
Start: 2023-02-07 | End: 2023-02-07

## 2023-02-07 RX ADMIN — HYDROXYZINE HYDROCHLORIDE 25 MG: 25 TABLET, FILM COATED ORAL at 21:25

## 2023-02-07 RX ADMIN — AMIODARONE HYDROCHLORIDE 1 MG/MIN: 50 INJECTION, SOLUTION INTRAVENOUS at 17:30

## 2023-02-07 RX ADMIN — ALPRAZOLAM 0.5 MG: 0.5 TABLET ORAL at 12:50

## 2023-02-07 RX ADMIN — ACETAMINOPHEN 650 MG: 325 TABLET ORAL at 21:15

## 2023-02-07 RX ADMIN — SODIUM CHLORIDE 250 ML: 9 INJECTION, SOLUTION INTRAVENOUS at 17:14

## 2023-02-07 RX ADMIN — TRAMADOL HYDROCHLORIDE 50 MG: 50 TABLET ORAL at 03:13

## 2023-02-07 RX ADMIN — LISINOPRIL 10 MG: 10 TABLET ORAL at 09:07

## 2023-02-07 RX ADMIN — SODIUM CHLORIDE, PRESERVATIVE FREE 10 ML: 5 INJECTION INTRAVENOUS at 21:15

## 2023-02-07 RX ADMIN — SODIUM CHLORIDE, PRESERVATIVE FREE 10 ML: 5 INJECTION INTRAVENOUS at 16:30

## 2023-02-07 RX ADMIN — ONDANSETRON 4 MG: 2 INJECTION INTRAMUSCULAR; INTRAVENOUS at 16:07

## 2023-02-07 RX ADMIN — TRAMADOL HYDROCHLORIDE 50 MG: 50 TABLET ORAL at 09:07

## 2023-02-07 RX ADMIN — DILTIAZEM HYDROCHLORIDE 5 MG/HR: 5 INJECTION INTRAVENOUS at 03:13

## 2023-02-07 RX ADMIN — ENOXAPARIN SODIUM 120 MG: 150 INJECTION SUBCUTANEOUS at 17:33

## 2023-02-07 RX ADMIN — ENOXAPARIN SODIUM 120 MG: 100 INJECTION SUBCUTANEOUS at 05:05

## 2023-02-07 RX ADMIN — METOPROLOL TARTRATE 25 MG: 25 TABLET, FILM COATED ORAL at 13:31

## 2023-02-07 RX ADMIN — HYDROXYZINE HYDROCHLORIDE 25 MG: 25 TABLET, FILM COATED ORAL at 05:05

## 2023-02-07 RX ADMIN — SODIUM CHLORIDE 250 ML: 9 INJECTION, SOLUTION INTRAVENOUS at 17:00

## 2023-02-07 NOTE — PROGRESS NOTES
Notified by RN that patient having chest pain. Per further discussion this is the same pain that he came to the ED with and he states it hasnt been treated today. Repeat EKG showing aflutter rate of 77. Diltiazem gtts ongoing and cardiology following.

## 2023-02-07 NOTE — ED NOTES
Bedside shift change report given to PORFIRIO Miller (oncoming nurse) by Misa Richards (offgoing nurse). Report included the following information SBAR, ED Summary, Intake/Output, MAR, Recent Results, and Med Rec Status.

## 2023-02-07 NOTE — ED NOTES
Verbal shift change report given to Leticia Khan RN (oncoming nurse) by Sunil Myers RN (offgoing nurse). Report included the following information SBAR, ED Summary, Intake/Output, MAR and Recent Results.

## 2023-02-07 NOTE — PROGRESS NOTES
Patient very restless and anxious during the night. States that he feels claustrophobic in his room. Informed Natalie Jerez. Hydroxyzine ordered and given. 0600 patient now resting with eyes closed.

## 2023-02-07 NOTE — PROGRESS NOTES
Bedside and Verbal shift change report given to Cullen Little RN (oncoming nurse) by Alex Ybarra (offgoing nurse). Report included the following information SBAR and Kardex.

## 2023-02-07 NOTE — PROGRESS NOTES
1418: TRANSFER - IN REPORT:    Verbal report received from [place], [address] [place] [address] (name) on Kellie Davis being received from [facility] (unit) for routine progression of care      Report consisted of patients Situation, Background, Assessment and   Recommendations(SBAR). Information from the following report(s) SBAR and Kardex was reviewed with the receiving nurse. Opportunity for questions and clarification was provided. Assessment completed upon patients arrival to unit and care assumed. 1930: Bedside shift change report given to Alina Haq, RN (oncoming nurse) by Aydee Perales (offgoing nurse). Report included the following information SBAR. Problem: General Medical Care Plan  Goal: *Anxiety reduced or absent  Outcome: Progressing Towards Goal    Problem: Falls - Risk of  Goal: *Absence of Falls  Description: Document James Fall Risk and appropriate interventions in the flowsheet. Outcome: Progressing Towards Goal  Note: Fall Risk Interventions:          Problem: Patient Education: Go to Patient Education Activity  Goal: Patient/Family Education  Outcome: Progressing Towards Goal     Problem: Pressure Injury - Risk of  Goal: *Prevention of pressure injury  Description: Document Jake Scale and appropriate interventions in the flowsheet.   Outcome: Progressing Towards Goal  Note: Pressure Injury Interventions:       Problem: Patient Education: Go to Patient Education Activity  Goal: Patient/Family Education  Outcome: Progressing Towards Goal     Problem: General Medical Care Plan  Goal: *Vital signs within specified parameters  Outcome: Progressing Towards Goal  Goal: *Labs within defined limits  Outcome: Progressing Towards Goal  Goal: *Absence of infection signs and symptoms  Outcome: Progressing Towards Goal  Goal: *Optimal pain control at patient's stated goal  Outcome: Progressing Towards Goal  Goal: *Skin integrity maintained  Outcome: Progressing Towards Goal  Goal: *Fluid volume balance  Outcome: Progressing Towards Goal  Goal: *Optimize nutritional status  Outcome: Progressing Towards Goal  Goal: *Anxiety reduced or absent  Outcome: Progressing Towards Goal  Goal: *Progressive mobility and function (eg: ADL's)  Outcome: Progressing Towards Goal     Problem: Patient Education: Go to Patient Education Activity  Goal: Patient/Family Education  Outcome: Progressing Towards Goal     Problem: Pain  Goal: *Control of Pain  Outcome: Progressing Towards Goal  Goal: *PALLIATIVE CARE:  Alleviation of Pain  Outcome: Progressing Towards Goal     Problem: Patient Education: Go to Patient Education Activity  Goal: Patient/Family Education  Outcome: Progressing Towards Goal

## 2023-02-07 NOTE — PROGRESS NOTES
+                                                                                                                                                                                     Critical Care Documentation    Name: Mary Ann Segovia  YOB: 1970  MRN: [id number]  Admission Date: 2/6/2023 11:40 AM    Date of service: 2/7/2023    Active Diagnoses:    Hospital Problems  Never Reviewed            Codes Class Noted POA    Atrial flutter [facility]) ICD-10-CM: I48.92  ICD-9-CM: 427.32  2/6/2023 Unknown           Chief Complaint: Hypotension      Clinical Presentation: Patient was admitted for A-fib RVR and chest pain RRT was called due to hypotension patient was given beta-blocker in the afternoon patient was also on Cardizem drip on arrival blood pressure was 70s by 60s patient was asymptomatic on the monitor heart rate was in 120s to 130s      Physical Exam:     General : alert x 3, awake, no acute distress,   HEENT: PEERL, EOMI, moist mucus membrane, TM clear  Neck: supple, no JVD, no meningeal signs  Chest: Clear to auscultation bilaterally   CVS: S1 S2 heard, Capillary refill less than 2 seconds  Abd: soft/ non tender, non distended, BS physiological,   Ext: no clubbing, no cyanosis, no edema, brisk 2+ DP pulses  Neuro/Psych: pleasant mood and affect, CN 2-12 grossly intact, sensory grossly within normal limit, Strength 5/5 in all extremities, DTR 1+ x 4  Skin: warm      Data Reviewed: All diagnostic labs and studies have been reviewed.       Assessment and Plan:    Atrial flutter 4: 1 conduction on the monitor  EKG reviewed  Fluid bolus given 500 mL  Stop Cardizem drip and beta-blocker  Start amiodarone drip as heart rate is still uncontrolled in 130's  Monitor on telemetry  Plan for cardioversion in the morning discussed with cardiology      Medications Administered:   Sedation: [ ] yes [ x] no   Anxiolytics: [ ] yes [x ] no   Antiarrhythmics: [ x] yes [ ] no   Antihypertensives: [ ] yes [ x] no   Pressors: [ ] yes [ x] no   IVF's: [ x] yes [ ] no       Critical Care Attestation: This patient is unstable and critically ill. Due to a high probability of clinically significant, life threatening deterioration, the patient required my highest level of preparedness to intervene emergently and I personally spent this critical care time directly and personally managing the patient. This critical care time included obtaining a history; examining the patient; pulse oximetry; ordering and review of studies; arranging urgent treatment with development of a management plan; evaluation of patient's response to treatment; frequent reassessment; and, discussions with other providers and/or family. This critical care time was performed to assess and manage the high probability of imminent, life-threatening deterioration that could result in multi-organ failure and death. It was exclusive of separately billable procedures, treating other patients, and teaching time. Time Spent:     I personally spent 35 minutes in providing critical care time.     Mariano Flores MD  2/7/2023  5:26 PM

## 2023-02-07 NOTE — ED NOTES
Pt called RN to room; pt visibly upset, states he feels no one has talked to him and doesn't want to be stuck in ER anymore. Pt states he wants to leave to go to another hospital. RN explained plan of care, pt wants to speak to MD.  MD paged.

## 2023-02-07 NOTE — ED NOTES
Verbal shift change report given to Rohith Rosen RN  (oncoming nurse) by [facility], RN  (offgoing nurse). Report included the following information SBAR, Kardex, ED Summary, MAR, and Recent Results.

## 2023-02-07 NOTE — PROGRESS NOTES
Cardiology Progress Note                                        Admit Date: 2/6/2023    Assessment/Plan:     PAF; persistent now; rate is better but will need BB added; will proceed with ACE/DCCV in am  HTN; better  Diastolic HF; due to Afib with RVR; better after one dose of Lasix; NYHA class IV but now clinically already III    Balbina Merrill is a 46 y.o. male with     PROBLEM LIST:  Patient Active Problem List    Diagnosis Date Noted    Diastolic heart failure ([facility] 02/07/2023    Atrial flutter ([facility] 02/06/2023         Subjective:     Balbina Merrill reports dyspnea, better    Visit Vitals  BP (!) 144/84   Pulse 94   Temp 98.2 °F (36.8 °C)   Resp 26   Ht 6' 1\" (1.854 m)   Wt 112.9 kg (249 lb)   SpO2 91%   BMI 32.85 kg/m²       Intake/Output Summary (Last 24 hours) at 2/7/2023 1327  Last data filed at 2/6/2023 1423  Gross per 24 hour   Intake 1000 ml   Output --   Net 1000 ml       Objective:      Physical Exam:  HEENT: Perrla, EOMI  Neck: No JVD,  No thyroidmegaly  Resp: no  rales  CV: [id number] No murmur no s3  Abd:Soft, Nontender  Ext: No edema  Neuro: Alert and oriented; Nonfocal  Skin: Warm, Dry, Intact  Pulses: 2+ DP/PT/Rad      Telemetry: AFIB    Current Facility-Administered Medications   Medication Dose Route Frequency    traMADoL (ULTRAM) tablet 50 mg  50 mg Oral Q6H PRN    hydrOXYzine HCL (ATARAX) tablet 25 mg  25 mg Oral TID PRN    ALPRAZolam (XANAX) tablet 0.5 mg  0.5 mg Oral QID PRN    metoprolol tartrate (LOPRESSOR) tablet 25 mg  25 mg Oral Q12H    enoxaparin (LOVENOX) injection 120 mg  1 mg/kg SubCUTAneous Q12H    dilTIAZem (CARDIZEM) 125 mg in dextrose 5% 125 mL infusion  0-15 mg/hr IntraVENous TITRATE    lisinopriL (PRINIVIL, ZESTRIL) tablet 10 mg  10 mg Oral DAILY    sodium chloride (NS) flush 5-40 mL  5-40 mL IntraVENous Q8H    sodium chloride (NS) flush 5-40 mL  5-40 mL IntraVENous PRN    acetaminophen (TYLENOL) tablet 650 mg  650 mg Oral Q6H PRN    Or    acetaminophen (TYLENOL) suppository 650 mg  650 mg Rectal Q6H PRN    polyethylene glycol (MIRALAX) packet 17 g  17 g Oral DAILY PRN    ondansetron (ZOFRAN ODT) tablet 4 mg  4 mg Oral Q8H PRN    Or    ondansetron (ZOFRAN) injection 4 mg  4 mg IntraVENous Q6H PRN     Current Outpatient Medications   Medication Sig    albuterol (PROVENTIL HFA, VENTOLIN HFA, PROAIR HFA) 90 mcg/actuation inhaler Take 2 Puffs by inhalation every four (4) hours as needed. lisinopriL (PRINIVIL, ZESTRIL) 10 mg tablet Take 10 mg by mouth daily.          Data Review:   Labs:    Recent Results (from the past 24 hour(s))   EKG, 12 LEAD, INITIAL    Collection Time: 02/06/23  9:14 PM   Result Value Ref Range    Ventricular Rate 77 BPM    Atrial Rate 293 BPM    QRS Duration 112 ms    Q-T Interval 394 ms    QTC Calculation (Bezet) 445 ms    Calculated P Axis -93 degrees    Calculated R Axis 28 degrees    Calculated T Axis -92 degrees    Diagnosis       Atrial flutter with variable AV block with premature ventricular or   aberrantly conducted complexes  Anterior infarct , age undetermined  T wave abnormality, consider lateral ischemia  Abnormal ECG  No previous ECGs available     METABOLIC PANEL, BASIC    Collection Time: 02/07/23 12:41 AM   Result Value Ref Range    Sodium 138 136 - 145 mmol/L    Potassium 3.5 3.5 - 5.1 mmol/L    Chloride 110 (H) 97 - 108 mmol/L    CO2 23 21 - 32 mmol/L    Anion gap 5 5 - 15 mmol/L    Glucose 106 (H) 65 - 100 mg/dL    BUN 21 (H) 6 - 20 MG/DL    Creatinine 1.29 0.70 - 1.30 MG/DL    BUN/Creatinine ratio 16 12 - 20      eGFR >60 >60 ml/min/1.73m2    Calcium 8.9 8.5 - 10.1 MG/DL   ECHO ADULT COMPLETE    Collection Time: 02/07/23  8:59 AM   Result Value Ref Range    IVSd 1.4 (A) 0.6 - 1.0 cm    LVIDd 5.6 4.2 - 5.9 cm    LVIDs 5.1 cm    LVOT Diameter 2.1 cm    LVPWd 1.1 (A) 0.6 - 1.0 cm    LV Ejection Fraction A2C 33 %    LV Ejection Fraction A4C 26 %    LVOT Peak Gradient 2 mmHg    LVOT Peak Velocity 0.7 m/s    RVIDd 3.9 cm    RVSP 42 mmHg    RV Free Wall Peak S' 9 cm/s    LA Diameter 5.7 cm    LA Volume A/L 153 mL    LA Volume 2C 152 (A) 18 - 58 mL    LA Volume 4C 145 (A) 18 - 58 mL    Est. RA Pressure 10 mmHg    AV Area by Peak Velocity 2.6 cm2    AV Peak Gradient 3 mmHg    AV Peak Velocity 0.9 m/s    MV Nyquist Velocity 30 cm/s    MV E Velocity 1.35 m/s    MV Regurg Velocity PISA 5.0 m/s    MR .6 cm    PV Peak Gradient 2 mmHg    PV Max Velocity 0.7 m/s    TAPSE 1.2 (A) 1.7 cm    TR Peak Gradient 32 mmHg    TR Max Velocity 2.81 m/s    Aortic Root 4.0 cm    Fractional Shortening 2D 9 28 - 44 %    LVIDd Index 2.37 cm/m2    LVIDs Index 2.16 cm/m2    LV RWT Ratio 0.39     LV Mass 2D 296.6 (A) 88 - 224 g    LV Mass 2D Index 125.7 (A) 49 - 115 g/m2    LA Volume Index A/L 65 16 - 34 mL/m2    LVOT Area 3.5 cm2    LA Volume Index 2C 64 (A) 16 - 34 mL/m2    LA Volume Index 4C 61 (A) 16 - 34 mL/m2    LA Size Index 2.42 cm/m2    LA/AO Root Ratio 1.43     Ao Root Index 1.69 cm/m2    AV Velocity Ratio 0.78     ISABELLA/BSA Peak Velocity 1.1 cm2/m2

## 2023-02-07 NOTE — ROUTINE PROCESS
TRANSFER - OUT REPORT:    Verbal report given to PORFIRIO Chandler(name) on Francis Remy  being transferred to [facility], room 462(unit) for routine progression of care       Report consisted of patients Situation, Background, Assessment and   Recommendations(SBAR). Information from the following report(s) SBAR, ED Summary, Intake/Output, MAR, and Recent Results was reviewed with the receiving nurse. Lines:   Peripheral IV 02/06/23 Left Antecubital (Active)   Site Assessment Clean, dry, & intact 02/06/23 2030   Phlebitis Assessment 0 02/06/23 2030   Infiltration Assessment 0 02/06/23 2030   Dressing Status Clean, dry, & intact 02/06/23 2030   Dressing Type Transparent 02/06/23 2030   Hub Color/Line Status Pink 02/06/23 2030   Action Taken Blood drawn 02/06/23 1003   Alcohol Cap Used Yes 02/06/23 1003        Opportunity for questions and clarification was provided.       Patient transported with:   Registered Nurse

## 2023-02-07 NOTE — ED NOTES
Verbal shift change report given to Ridge De Paz RN  (oncoming nurse) by Henrry Wallace RN (offgoing nurse). Report included the following information SBAR, Kardex, ED Summary, MAR, and Recent Results.

## 2023-02-07 NOTE — PROGRESS NOTES
Hospitalist Progress Note  Dee Hansen MD  Answering service: [phone] OR [phone] from in house phone        Date of Service:  2023  NAME:  Galileo Mcguire  :  1970  MRN:  [id number]      Admission Summary:     Patient presents with a flutter with RVR and chest pain. Interval history / Subjective:     Patient still having on and off chest pain. Assessment & Plan:     Atrial fibrillation with RVR  -New onset atrial fibrillation with RVR, will admit for further evaluation  -TSH within normal limits, optimize electrolytes  -Continue Cardizem drip, continue anticoagulation with Lovenox 1 mg/kg subcu every 12 hours  -Check echocardiogram, cardiology evaluating, consideration for ACE cardioversion pending clinical course     Mild elevated troponin likely demand ischemia  -This is likely demand ischemia in the setting of tachycardia  -Continue monitor, cardiology following     Hypertensive urgency  -Elevated blood pressure of 165/112 on presentation  -Cardizem drip has been started for a flutter RVR, continue lisinopril from home  -Monitor blood pressure     Abnormal creatinine  -No previous baseline renal function  -Monitor     Dyspnea  -This is likely induced by tachycardia  -CTA negative for PE or other acute pathology and patient is not hypoxic     Code status: Full  Prophylaxis: Lovenox  Care Plan discussed with: Patient  Anticipated Disposition: 24 to 48 hours     Hospital Problems  Never Reviewed            Codes Class Noted POA    Atrial flutter (HCC) ICD-10-CM: O35.48  ICD-9-CM: 427.32  2023 Unknown               Review of Systems:   A comprehensive review of systems was negative except for that written in the HPI. Vital Signs:    Last 24hrs VS reviewed since prior progress note.  Most recent are:  Visit Vitals  /89 (BP 1 Location: Right arm, BP Patient Position: At rest)   Pulse 93 Temp 97.8 °F (36.6 °C)   Resp 14   SpO2 95%         Intake/Output Summary (Last 24 hours) at 2/7/2023 0816  Last data filed at 2/6/2023 1423  Gross per 24 hour   Intake 1000 ml   Output --   Net 1000 ml        Physical Examination:     I had a face to face encounter with this patient and independently examined them on 2/7/2023 as outlined below:          General : alert x 3, awake, no acute distress,   HEENT: PEERL, EOMI, moist mucus membrane, TM clear  Neck: supple, no JVD, no meningeal signs  Chest: Clear to auscultation bilaterally   CVS: S1 S2 heard, Capillary refill less than 2 seconds  Abd: soft/ non tender, non distended, BS physiological,   Ext: no clubbing, no cyanosis, no edema, brisk 2+ DP pulses  Neuro/Psych: pleasant mood and affect, CN 2-12 grossly intact, sensory grossly within normal limit, Strength 5/5 in all extremities, DTR 1+ x 4  Skin: warm            Data Review:    Review and/or order of clinical lab test    I have independently reviewed and interpreted patient's lab and all other diagnostic data    Notes reviewed from all clinical/nonclinical/nursing services involved in patient's clinical care. Care coordination discussions were held with appropriate clinical/nonclinical/ nursing providers based on care coordination needs. Labs:     Recent Labs     02/06/23  0958   WBC 4.5   HGB 17.2*   HCT 49.8        Recent Labs     02/07/23  0041 02/06/23  0958    139   K 3.5 3.6   * 109*   CO2 23 24   BUN 21* 22*   CREA 1.29 1.37*   * 131*   CA 8.9 9.7   MG  --  2.2     Recent Labs     02/06/23  0958   ALT 96*   AP 64   TBILI 0.8   TP 8.2   ALB 4.5   GLOB 3.7     No results for input(s): INR, PTP, APTT, INREXT in the last 72 hours. No results for input(s): FE, TIBC, PSAT, FERR in the last 72 hours. No results found for: FOL, RBCF   No results for input(s): PH, PCO2, PO2 in the last 72 hours. No results for input(s): CPK, CKNDX, TROIQ in the last 72 hours.     No lab exists for component: CPKMB  No results found for: CHOL, CHOLX, CHLST, CHOLV, HDL, HDLP, LDL, LDLC, DLDLP, TGLX, TRIGL, TRIGP, CHHD, CHHDX  No results found for: GLUCPOC  No results found for: COLOR, APPRN, SPGRU, REFSG, ADELSO, PROTU, GLUCU, KETU, BILU, UROU, GENIA, LEUKU, GLUKE, EPSU, BACTU, WBCU, RBCU, CASTS, UCRY      Medications Reviewed:     Current Facility-Administered Medications   Medication Dose Route Frequency    traMADoL (ULTRAM) tablet 50 mg  50 mg Oral Q6H PRN    hydrOXYzine HCL (ATARAX) tablet 25 mg  25 mg Oral TID PRN    enoxaparin (LOVENOX) injection 120 mg  1 mg/kg SubCUTAneous Q12H    dilTIAZem (CARDIZEM) 125 mg in dextrose 5% 125 mL infusion  0-15 mg/hr IntraVENous TITRATE    lisinopriL (PRINIVIL, ZESTRIL) tablet 10 mg  10 mg Oral DAILY    sodium chloride (NS) flush 5-40 mL  5-40 mL IntraVENous Q8H    sodium chloride (NS) flush 5-40 mL  5-40 mL IntraVENous PRN    acetaminophen (TYLENOL) tablet 650 mg  650 mg Oral Q6H PRN    Or    acetaminophen (TYLENOL) suppository 650 mg  650 mg Rectal Q6H PRN    polyethylene glycol (MIRALAX) packet 17 g  17 g Oral DAILY PRN    ondansetron (ZOFRAN ODT) tablet 4 mg  4 mg Oral Q8H PRN    Or    ondansetron (ZOFRAN) injection 4 mg  4 mg IntraVENous Q6H PRN     Current Outpatient Medications   Medication Sig    albuterol (PROVENTIL HFA, VENTOLIN HFA, PROAIR HFA) 90 mcg/actuation inhaler Take 2 Puffs by inhalation every four (4) hours as needed. lisinopriL (PRINIVIL, ZESTRIL) 10 mg tablet Take 10 mg by mouth daily.      ______________________________________________________________________  EXPECTED LENGTH OF STAY: - - -  ACTUAL LENGTH OF STAY:          1                 Thi Horton MD

## 2023-02-07 NOTE — PROGRESS NOTES
Patient complains of chest discomfort. Informed Ana Charter.  Once dose of Tramadol ordered and given

## 2023-02-08 ENCOUNTER — APPOINTMENT (OUTPATIENT)
Dept: NON INVASIVE DIAGNOSTICS | Age: 53
DRG: 308 | End: 2023-02-08
Attending: INTERNAL MEDICINE
Payer: COMMERCIAL

## 2023-02-08 VITALS
SYSTOLIC BLOOD PRESSURE: 117 MMHG | OXYGEN SATURATION: 91 % | WEIGHT: 249 LBS | DIASTOLIC BLOOD PRESSURE: 83 MMHG | HEIGHT: 73 IN | HEART RATE: 90 BPM | TEMPERATURE: 97.7 F | BODY MASS INDEX: 33 KG/M2 | RESPIRATION RATE: 14 BRPM

## 2023-02-08 LAB
ATRIAL RATE: 260 BPM
ATRIAL RATE: 86 BPM
CALCULATED P AXIS, ECG09: -73 DEGREES
CALCULATED P AXIS, ECG09: 55 DEGREES
CALCULATED R AXIS, ECG10: -30 DEGREES
CALCULATED R AXIS, ECG10: 120 DEGREES
CALCULATED T AXIS, ECG11: 123 DEGREES
CALCULATED T AXIS, ECG11: 68 DEGREES
DIAGNOSIS, 93000: NORMAL
DIAGNOSIS, 93000: NORMAL
P-R INTERVAL, ECG05: 190 MS
Q-T INTERVAL, ECG07: 414 MS
Q-T INTERVAL, ECG07: 430 MS
QRS DURATION, ECG06: 100 MS
QRS DURATION, ECG06: 106 MS
QTC CALCULATION (BEZET), ECG08: 430 MS
QTC CALCULATION (BEZET), ECG08: 514 MS
VENTRICULAR RATE, ECG03: 65 BPM
VENTRICULAR RATE, ECG03: 86 BPM

## 2023-02-08 PROCEDURE — 74011000250 HC RX REV CODE- 250: Performed by: FAMILY MEDICINE

## 2023-02-08 PROCEDURE — 92960 CARDIOVERSION ELECTRIC EXT: CPT

## 2023-02-08 PROCEDURE — 74011250636 HC RX REV CODE- 250/636: Performed by: HOSPITALIST

## 2023-02-08 PROCEDURE — 99152 MOD SED SAME PHYS/QHP 5/>YRS: CPT

## 2023-02-08 PROCEDURE — 74011250636 HC RX REV CODE- 250/636: Performed by: INTERNAL MEDICINE

## 2023-02-08 PROCEDURE — 93005 ELECTROCARDIOGRAM TRACING: CPT

## 2023-02-08 PROCEDURE — 74011250637 HC RX REV CODE- 250/637: Performed by: FAMILY MEDICINE

## 2023-02-08 PROCEDURE — 99153 MOD SED SAME PHYS/QHP EA: CPT

## 2023-02-08 PROCEDURE — 74011000250 HC RX REV CODE- 250: Performed by: INTERNAL MEDICINE

## 2023-02-08 PROCEDURE — 93312 ECHO TRANSESOPHAGEAL: CPT

## 2023-02-08 PROCEDURE — 74011250636 HC RX REV CODE- 250/636: Performed by: FAMILY MEDICINE

## 2023-02-08 PROCEDURE — 74011000258 HC RX REV CODE- 258: Performed by: HOSPITALIST

## 2023-02-08 RX ORDER — SODIUM CHLORIDE 9 MG/ML
50 INJECTION, SOLUTION INTRAVENOUS CONTINUOUS
Status: DISCONTINUED | OUTPATIENT
Start: 2023-02-08 | End: 2023-02-08 | Stop reason: HOSPADM

## 2023-02-08 RX ORDER — LIDOCAINE HYDROCHLORIDE 20 MG/ML
15 SOLUTION OROPHARYNGEAL
Status: DISCONTINUED | OUTPATIENT
Start: 2023-02-08 | End: 2023-02-08 | Stop reason: HOSPADM

## 2023-02-08 RX ORDER — FENTANYL CITRATE 50 UG/ML
200 INJECTION, SOLUTION INTRAMUSCULAR; INTRAVENOUS
Status: DISCONTINUED | OUTPATIENT
Start: 2023-02-08 | End: 2023-02-08

## 2023-02-08 RX ORDER — MIDAZOLAM HYDROCHLORIDE 1 MG/ML
10 INJECTION, SOLUTION INTRAMUSCULAR; INTRAVENOUS
Status: DISCONTINUED | OUTPATIENT
Start: 2023-02-08 | End: 2023-02-08

## 2023-02-08 RX ORDER — SODIUM CHLORIDE 9 MG/ML
25 INJECTION, SOLUTION INTRAVENOUS
Status: DISCONTINUED | OUTPATIENT
Start: 2023-02-08 | End: 2023-02-08 | Stop reason: HOSPADM

## 2023-02-08 RX ADMIN — FENTANYL CITRATE 25 MCG: 50 INJECTION, SOLUTION INTRAMUSCULAR; INTRAVENOUS at 09:24

## 2023-02-08 RX ADMIN — LISINOPRIL 10 MG: 10 TABLET ORAL at 08:24

## 2023-02-08 RX ADMIN — AMIODARONE HYDROCHLORIDE 0.5 MG/MIN: 50 INJECTION, SOLUTION INTRAVENOUS at 00:37

## 2023-02-08 RX ADMIN — MIDAZOLAM 2 MG: 1 INJECTION INTRAMUSCULAR; INTRAVENOUS at 09:21

## 2023-02-08 RX ADMIN — FENTANYL CITRATE 25 MCG: 50 INJECTION, SOLUTION INTRAMUSCULAR; INTRAVENOUS at 09:27

## 2023-02-08 RX ADMIN — SODIUM CHLORIDE, PRESERVATIVE FREE 10 ML: 5 INJECTION INTRAVENOUS at 07:25

## 2023-02-08 RX ADMIN — ENOXAPARIN SODIUM 120 MG: 150 INJECTION SUBCUTANEOUS at 07:25

## 2023-02-08 RX ADMIN — Medication 15 ML: at 09:15

## 2023-02-08 RX ADMIN — SODIUM CHLORIDE, PRESERVATIVE FREE 10 ML: 5 INJECTION INTRAVENOUS at 17:33

## 2023-02-08 RX ADMIN — ENOXAPARIN SODIUM 120 MG: 150 INJECTION SUBCUTANEOUS at 17:31

## 2023-02-08 RX ADMIN — MIDAZOLAM 1 MG: 1 INJECTION INTRAMUSCULAR; INTRAVENOUS at 09:24

## 2023-02-08 RX ADMIN — SODIUM CHLORIDE 50 ML/HR: 9 INJECTION, SOLUTION INTRAVENOUS at 10:11

## 2023-02-08 RX ADMIN — FENTANYL CITRATE 50 MCG: 50 INJECTION, SOLUTION INTRAMUSCULAR; INTRAVENOUS at 09:21

## 2023-02-08 RX ADMIN — MIDAZOLAM 1 MG: 1 INJECTION INTRAMUSCULAR; INTRAVENOUS at 09:27

## 2023-02-08 NOTE — PROGRESS NOTES
Problem: Falls - Risk of  Goal: *Absence of Falls  Description: Document Sanot Thomas Fall Risk and appropriate interventions in the flowsheet. Outcome: Progressing Towards Goal  Note: Fall Risk Interventions:                                Problem: Patient Education: Go to Patient Education Activity  Goal: Patient/Family Education  Outcome: Progressing Towards Goal     Problem: Pressure Injury - Risk of  Goal: *Prevention of pressure injury  Description: Document Jake Scale and appropriate interventions in the flowsheet.   Outcome: Progressing Towards Goal  Note: Pressure Injury Interventions:                                            Problem: Patient Education: Go to Patient Education Activity  Goal: Patient/Family Education  Outcome: Progressing Towards Goal     Problem: General Medical Care Plan  Goal: *Vital signs within specified parameters  Outcome: Progressing Towards Goal  Goal: *Labs within defined limits  Outcome: Progressing Towards Goal  Goal: *Absence of infection signs and symptoms  Outcome: Progressing Towards Goal  Goal: *Optimal pain control at patient's stated goal  Outcome: Progressing Towards Goal  Goal: *Skin integrity maintained  Outcome: Progressing Towards Goal  Goal: *Fluid volume balance  Outcome: Progressing Towards Goal  Goal: *Optimize nutritional status  Outcome: Progressing Towards Goal  Goal: *Anxiety reduced or absent  Outcome: Progressing Towards Goal  Goal: *Progressive mobility and function (eg: ADL's)  Outcome: Progressing Towards Goal     Problem: Pain  Goal: *Control of Pain  Outcome: Progressing Towards Goal  Goal: *PALLIATIVE CARE:  Alleviation of Pain  Outcome: Progressing Towards Goal

## 2023-02-08 NOTE — PROGRESS NOTES
TRANSFER - OUT REPORT:    Verbal report given to PORFIRIO Chandler(name) on Wileen Clink being transferred to [facility](unit) for routine progression of care       Report consisted of patient's Situation, Background, Assessment and   Recommendations(SBAR). Information from the following report(s) SBAR, Procedure Summary, Intake/Output, MAR, Recent Results and Cardiac Rhythm NSR post successful [address] was reviewed with the receiving nurse. Opportunity for questions and clarification was provided.       Patient transported with:   Monitor  Registered Nurse

## 2023-02-08 NOTE — PROGRESS NOTES
Hospitalist Progress Note  Ashley Lawson MD  Answering service: [phone] OR [phone] from in house phone        Date of Service:  2023  NAME:  Abelino Aguirre  :  1970  MRN:  [id number]      Admission Summary:     Patient presents with a flutter with RVR and chest pain. Interval history / Subjective:     Patient still having on and off chest pain. Rapid response called yesterday evening due to low blood pressure. Blood pressure is improved.      Assessment & Plan:     Atrial fibrillation with RVR  -New onset atrial fibrillation with RVR, will admit for further evaluation  -TSH within normal limits, optimize electrolytes  -Initially on Cardizem drip and beta-blocker which were discontinued due to low blood pressure, continue anticoagulation with Lovenox 1 mg/kg subcu every 12 hours  -Currently on amnio drip  -Cardiology following, plan for for ACE cardioversion today    Low blood pressure  -Likely medication induced, now off Cardizem and beta-blocker     Mild elevated troponin likely demand ischemia  -This is likely demand ischemia in the setting of tachycardia  -Continue monitor, cardiology following     Hypertensive urgency  -Elevated blood pressure of 165/112 on presentation  -Patient was hypotensive 2023 and antihypertensives were discontinued  -Monitor blood pressure     Abnormal creatinine  -No previous baseline renal function  -Monitor     Dyspnea  -This is likely induced by tachycardia  -CTA negative for PE or other acute pathology and patient is not hypoxic     Code status: Full  Prophylaxis: Lovenox  Care Plan discussed with: Patient  Anticipated Disposition: 24 to 48 hours     Hospital Problems  Never Reviewed            Codes Class Noted POA    Atrial flutter ([facility] ICD-10-CM: A98.13  ICD-9-CM: 427.32  2023 Unknown               Review of Systems:   A comprehensive review of systems was negative except for that written in the HPI. Vital Signs:    Last 24hrs VS reviewed since prior progress note. Most recent are:  Visit Vitals  /67 (BP 1 Location: Left upper arm, BP Patient Position: At rest)   Pulse 85   Temp 97.8 °F (36.6 °C)   Resp 11   Ht 6' 1\" (1.854 m)   Wt 112.9 kg (249 lb)   SpO2 91%   BMI 32.85 kg/m²         Intake/Output Summary (Last 24 hours) at 2/8/2023 1149  Last data filed at 2/8/2023 0459  Gross per 24 hour   Intake 879.5 ml   Output 0 ml   Net 879.5 ml        Physical Examination:     I had a face to face encounter with this patient and independently examined them on 2/8/2023 as outlined below:          General : alert x 3, awake, no acute distress,   HEENT: PEERL, EOMI, moist mucus membrane, TM clear  Neck: supple, no JVD, no meningeal signs  Chest: Clear to auscultation bilaterally   CVS: S1 S2 heard, Capillary refill less than 2 seconds  Abd: soft/ non tender, non distended, BS physiological,   Ext: no clubbing, no cyanosis, no edema, brisk 2+ DP pulses  Neuro/Psych: pleasant mood and affect, CN 2-12 grossly intact, sensory grossly within normal limit, Strength 5/5 in all extremities, DTR 1+ x 4  Skin: warm            Data Review:    Review and/or order of clinical lab test    I have independently reviewed and interpreted patient's lab and all other diagnostic data    Notes reviewed from all clinical/nonclinical/nursing services involved in patient's clinical care. Care coordination discussions were held with appropriate clinical/nonclinical/ nursing providers based on care coordination needs.      Labs:     Recent Labs     02/06/23  0958   WBC 4.5   HGB 17.2*   HCT 49.8        Recent Labs     02/07/23  0041 02/06/23  0958    139   K 3.5 3.6   * 109*   CO2 23 24   BUN 21* 22*   CREA 1.29 1.37*   * 131*   CA 8.9 9.7   MG  --  2.2     Recent Labs     02/06/23  0958   ALT 96*   AP 64   TBILI 0.8   TP 8.2   ALB 4.5   GLOB 3.7     No results for input(s): INR, PTP, APTT, INREXT, INREXT in the last 72 hours. No results for input(s): FE, TIBC, PSAT, FERR in the last 72 hours. No results found for: FOL, RBCF   No results for input(s): PH, PCO2, PO2 in the last 72 hours. No results for input(s): CPK, CKNDX, TROIQ in the last 72 hours.     No lab exists for component: CPKMB  No results found for: CHOL, CHOLX, CHLST, CHOLV, HDL, HDLP, LDL, LDLC, DLDLP, TGLX, TRIGL, TRIGP, CHHD, CHHDX  No results found for: GLUCPOC  No results found for: COLOR, APPRN, SPGRU, REFSG, DAELSO, PROTU, GLUCU, KETU, BILU, UROU, GENIA, LEUKU, GLUKE, EPSU, BACTU, WBCU, RBCU, CASTS, UCRY      Medications Reviewed:     Current Facility-Administered Medications   Medication Dose Route Frequency    0.9% sodium chloride infusion  50 mL/hr IntraVENous CONTINUOUS    0.9% sodium chloride infusion  25 mL/hr IntraVENous RAD ONCE    lidocaine (XYLOCAINE) 2 % viscous solution 15 mL  15 mL Mouth/Throat RAD PRN    traMADoL (ULTRAM) tablet 50 mg  50 mg Oral Q6H PRN    hydrOXYzine HCL (ATARAX) tablet 25 mg  25 mg Oral TID PRN    ALPRAZolam (XANAX) tablet 0.5 mg  0.5 mg Oral QID PRN    enoxaparin (LOVENOX) injection 120 mg  1 mg/kg SubCUTAneous Q12H    amiodarone (CORDARONE) 375 mg/250 mL D5W infusion  0.5-1 mg/min IntraVENous TITRATE    [Held by provider] lisinopriL (PRINIVIL, ZESTRIL) tablet 10 mg  10 mg Oral DAILY    sodium chloride (NS) flush 5-40 mL  5-40 mL IntraVENous Q8H    sodium chloride (NS) flush 5-40 mL  5-40 mL IntraVENous PRN    acetaminophen (TYLENOL) tablet 650 mg  650 mg Oral Q6H PRN    Or    acetaminophen (TYLENOL) suppository 650 mg  650 mg Rectal Q6H PRN    polyethylene glycol (MIRALAX) packet 17 g  17 g Oral DAILY PRN    ondansetron (ZOFRAN ODT) tablet 4 mg  4 mg Oral Q8H PRN    Or    ondansetron (ZOFRAN) injection 4 mg  4 mg IntraVENous Q6H PRN     ______________________________________________________________________  EXPECTED LENGTH OF STAY: 3d 9h  ACTUAL LENGTH OF STAY: 2                 Kely Garcia MD

## 2023-02-08 NOTE — PROGRESS NOTES
0730:  Bedside shift change report given to [address] [place] [address], RNs (oncoming nurse) by Narcisa Boston RN (offgoing nurse). Report included the following information SBAR.      0830:  TRANSFER - OUT REPORT:    Verbal report given to Braulio Cross RN (name) on [facility]  being transferred to stress lab (unit) for ordered procedure       Report consisted of patients Situation, Background, Assessment and   Recommendations(SBAR). Information from the following report(s) SBAR was reviewed with the receiving nurse. Lines:   Peripheral IV 02/06/23 Left Antecubital (Active)   Site Assessment Clean, dry, & intact 02/08/23 0824   Phlebitis Assessment 0 02/08/23 0824   Infiltration Assessment 0 02/08/23 0824   Dressing Status Clean, dry, & intact 02/08/23 0824   Dressing Type Transparent 02/08/23 0824   Hub Color/Line Status Pink; Infusing 02/08/23 0824   Action Taken Dressing reinforced 02/08/23 0824   Alcohol Cap Used Yes 02/08/23 0824        Opportunity for questions and clarification was provided. 1035: TRANSFER - IN REPORT:    Verbal report received from PORFIRIO Presley (name) on [facility]  being received from [facility] (unit) for routine progression of care      Report consisted of patients Situation, Background, Assessment and   Recommendations(SBAR). Information from the following report(s) SBAR was reviewed with the receiving nurse. Opportunity for questions and clarification was provided. Assessment completed upon patients arrival to unit and care assumed. Patient transported with:   Monitor  Registered Nurse             Problem: Falls - Risk of  Goal: *Absence of Falls  Description: Document Luis Cordero Fall Risk and appropriate interventions in the flowsheet.   Outcome: Progressing Towards Goal  Note: Fall Risk Interventions:            Problem: Patient Education: Go to Patient Education Activity  Goal: Patient/Family Education  Outcome: Progressing Towards Goal     Problem: Pressure Injury - Risk of  Goal: *Prevention of pressure injury  Description: Document Jake Scale and appropriate interventions in the flowsheet.   Outcome: Progressing Towards Goal  Note: Pressure Injury Interventions:               Problem: Patient Education: Go to Patient Education Activity  Goal: Patient/Family Education  Outcome: Progressing Towards Goal     Problem: General Medical Care Plan  Goal: *Vital signs within specified parameters  Outcome: Progressing Towards Goal  Goal: *Labs within defined limits  Outcome: Progressing Towards Goal  Goal: *Absence of infection signs and symptoms  Outcome: Progressing Towards Goal  Goal: *Optimal pain control at patient's stated goal  Outcome: Progressing Towards Goal  Goal: *Skin integrity maintained  Outcome: Progressing Towards Goal  Goal: *Fluid volume balance  Outcome: Progressing Towards Goal  Goal: *Optimize nutritional status  Outcome: Progressing Towards Goal  Goal: *Anxiety reduced or absent  Outcome: Progressing Towards Goal  Goal: *Progressive mobility and function (eg: ADL's)  Outcome: Progressing Towards Goal     Problem: Patient Education: Go to Patient Education Activity  Goal: Patient/Family Education  Outcome: Progressing Towards Goal     Problem: Pain  Goal: *Control of Pain  Outcome: Progressing Towards Goal  Goal: *PALLIATIVE CARE:  Alleviation of Pain  Outcome: Progressing Towards Goal     Problem: Patient Education: Go to Patient Education Activity  Goal: Patient/Family Education  Outcome: Progressing Towards Goal

## 2023-02-08 NOTE — PROGRESS NOTES
Transition of Care    RUR: 9%  Disposition: Home with dutch  Barriers: None  Transportation: Either drive his truck home or [place]    Reason for Admission:  Shortness of Breath                   RUR Score: 9%                    Plan for utilizing home health:  Mr. Leelee Hodge is willing to utilize home health services if needed. PCP: First and Last name:  Dr. Joe Mccabe     Name of Practice: [address]   Are you a current patient: Yes/No: Mr. Leelee Hodge has his first appointment on 2/20/2023 with Dr. Ravindra Coelho   Approximate date of last visit: None   Can you participate in a virtual visit with your PCP: Yes                    Current Advanced Directive/Advance Care Plan: Full Code  Mr. Leelee Hodge stated that he does have an advance care plan. He was asked to provide a copy for the medical record. Healthcare Decision Maker:   Click here to complete [place] including selection of the Healthcare Decision Maker Relationship (ie \"Primary\")  Mr. Hogan's healthcare decision maker is his [employer] Screen. [phone]                  Transition of Care Plan:   Mr. Leelee Hodge was seen in his room. His address and phone number were verified. He lives with his dutch in a 1 story, single family residence with 2 steps to enter. He does not have durable medical equipment. He is employed. He does drive. He was independent with his ADLs and IADLs prior to his admission. His pharmacies are [facility] and [facility] on [address] He is able to afford and acquire his medications. He will either drive himself home at discharge or take a [employer]. Care Management Interventions  PCP Verified by CM:  Yes  Palliative Care Criteria Met (RRAT>21 & CHF Dx)?: No  Mode of Transport at Discharge: Self  Transition of Care Consult (CM Consult): Discharge Planning  MyChart Signup: No  Discharge Durable Medical Equipment: No  Physical Therapy Consult: No  Occupational Therapy Consult: No  Speech Therapy Consult: No  Support Systems: Spouse/Significant Other  The Patient and/or Patient Representative was Provided with a Choice of Provider and Agrees with the Discharge Plan?: No  Freedom of Choice List was Provided with Basic Dialogue that Supports the Patient's Individualized Plan of Care/Goals, Treatment Preferences and Shares the Quality Data Associated with the Providers?: No   Resource Information Provided?: No  Discharge Location  Patient Expects to be Discharged to[de-identified] Home      Will continue to follow for discharge planning.   Signed By: Danica Bowman LCSW     February 8, 2023

## 2023-02-08 NOTE — PROGRESS NOTES
1415: TRANSFER - IN REPORT:    Verbal report received from [address] (name) on Parvez Delgadillo  being received from ED (unit) for routine progression of care      Report consisted of patients Situation, Background, Assessment and   Recommendations(SBAR). Information from the following report(s) SBAR, MAR, and Recent Results was reviewed with the receiving nurse. Opportunity for questions and clarification was provided. Assessment completed upon patients arrival to unit and care assumed. 1630: Patient nauseous, -130s while on diltiazem drip, BP 85/62. Paged Dr. Todd Colón. Diltiazem drip stopped at 1635.    1655: Rapid Response called, BP 71/44.    1700: RRT RN Tiffany Beasley at bedside. 250mL bolus ordered and started. Dr. Todd Colón paged again. 1715: Dr. Karla Quevedo at bedside. Orders received. BP improving. 1730: Amiodarone drip started per order. 2000: Bedside shift change report given to Nisha Champagne RN (oncoming nurse) by Aydee Davenport (offgoing nurse). Report included the following information SBAR, MAR, and Recent Results. Charting and patient care of Parvez Delgadillo by Kimmy Bolanos RN from 9711 to 2000 was supervised and reviewed by this RN.

## 2023-02-08 NOTE — DISCHARGE SUMMARY
Discharge Summary       PATIENT ID: Nithin Huerta  MRN: [id number]   YOB: 1970    DATE OF ADMISSION: 2/6/2023 11:40 AM    DATE OF DISCHARGE: 2/8/2023   PRIMARY CARE PROVIDER: None     ATTENDING PHYSICIAN: Juan Carlos Singer  DISCHARGING PROVIDER: Jena Bautista MD      CONSULTATIONS: IP CONSULT TO CARDIOLOGY  IP CONSULT TO CARDIOLOGY    PROCEDURES/SURGERIES: * No surgery found *    ADMISSION SUMMARY AND HOSPITAL COURSE:     HPI  Nithin Huerta is a 46 y.o. male who presents with shortness of breath as well as chest pain for about a week. Patient was also noted to have sensation of palpitation even with slight activities. Patient was recently seen at an urgent care with similar symptoms several weeks ago and was diagnosed with bronchitis and treated. Patient with ongoing symptoms and was evaluated by PCP today, and subsequently sent to the emergency room. Patient subsequently presented to the emergency room. Patient with normal first troponin is slightly elevated second troponin. On further evaluation, patient was noted to be in a flutter with RVR. CTA of the chest was negative. Patient received one-time dose of Lasix as well as started on Cardizem drip. Cardiology has evaluated the patient admitted for further evaluation management. Hospital Course  Patient was admitted for further management of A-fib RVR. Initially was started on Cardizem drip and beta-blocker but later found to be hypotensive and those were discontinued and started on amnio drip. Patient was also anticoagulated with Lovenox full dose. Later cardiology evaluated the patient and went for ACE cardioversion with subsequent conversion to sinus rhythm. Hypotension has also improved. Patient was discharged to home in stable condition. DISCHARGE DIAGNOSES / PLAN:        BMI: Body mass index is 32.85 kg/m². . This patient: Meets criteria for obesity given BMI >/= 30 and < 40 due to excess calories/nutritional. Weight loss and lifestyle modifications should be encouraged as an outpatient. PENDING TEST RESULTS:   At the time of discharge the following test results are still pending: None     ADDITIONAL CARE RECOMMENDATIONS:     Follow-up with cardiology as scheduled. DIET: Cardiac Diet    ACTIVITY: Activity as tolerated    EQUIPMENT needed: None    NOTIFY YOUR PHYSICIAN FOR ANY OF THE FOLLOWING:   Fever over 101 degrees for 24 hours. Chest pain, shortness of breath, fever, chills, nausea, vomiting, diarrhea, change in mentation, falling, weakness, bleeding. Severe pain or pain not relieved by medications, as well as any other signs or symptoms that you may have questions about. FOLLOW UP APPOINTMENTS:    Follow-up Information       Follow up With Specialties Details Why Contact Info    None    None ([phone]) Patient stated that they have no PCP               DISCHARGE MEDICATIONS:  Current Discharge Medication List        CONTINUE these medications which have NOT CHANGED    Details   albuterol (PROVENTIL HFA, VENTOLIN HFA, PROAIR HFA) 90 mcg/actuation inhaler Take 2 Puffs by inhalation every four (4) hours as needed. STOP taking these medications       lisinopriL (PRINIVIL, ZESTRIL) 10 mg tablet Comments:   Reason for Stopping:               DISPOSITION:  *  Home With:   OT  PT  HH  RN       Long term SNF/Inpatient Rehab    Independent/assisted living    Hospice    Other:     PATIENT CONDITION AT DISCHARGE:     Functional status    Poor     Deconditioned    * Independent      Cognition    * Lucid     Forgetful     Dementia      Catheters/lines (plus indication)    Gray     PICC     PEG    * None      Code status    * Full code     DNR      PHYSICAL EXAMINATION AT DISCHARGE:    General:          Alert, cooperative, no distress, appears stated age.      HEENT:           Atraumatic, anicteric sclerae, pink conjunctivae                          No oral ulcers, mucosa moist, throat clear, dentition fair  Neck: Supple, symmetrical  Lungs:             Clear to auscultation bilaterally. No Wheezing or Rhonchi. No rales. Chest wall:      No tenderness  No Accessory muscle use. Heart:              Regular  rhythm,  No  murmur   No edema  Abdomen:        Soft, non-tender. Not distended. Bowel sounds normal  Extremities:     No cyanosis. No clubbing,                            Skin turgor normal, Capillary refill normal  Skin:                Not pale. Not Jaundiced  No rashes   Psych:             Not anxious or agitated.   Neurologic:      Alert, moves all extremities, answers questions appropriately and responds to commands       CHRONIC MEDICAL DIAGNOSES:  Problem List as of 2/8/2023 Never Reviewed            Codes Class Noted - Resolved    Diastolic heart failure ([address] ICD-10-CM: I50.30  ICD-9-CM: 428.30  2/7/2023 - Present        Atrial flutter ([facility] ICD-10-CM: R43.42  ICD-9-CM: 427.32  2/6/2023 - Present           Greater than 60 minutes were spent with the patient on counseling and coordination of care    Signed:   Akhil Damon MD  2/8/2023  6:31 PM

## 2023-02-08 NOTE — PROGRESS NOTES
TRANSFER - IN REPORT:    Verbal report received from PORFIRIO Chandler(name) on Divya Benitez  being received from 462(unit) for ordered procedure      Report consisted of patients Situation, Background, Assessment and   Recommendations(SBAR). Information from the following report(s) SBAR, Procedure Summary and MAR was reviewed with the receiving nurse. Opportunity for questions and clarification was provided. Assessment completed upon patients arrival to unit and care assumed.

## 2023-02-08 NOTE — PROGRESS NOTES
Rapid response room 462 called at this time. RRT responding. Rapid called for hypotension. Laid bed flat and ordered a per protocol 250 mL NS bolus. RNs and supervisor to attempt to get in touch with Dr. Latrelle Krabbe. Dr. Stephanie Vázquez came to the bedside. Orders received. Checked in with primary RN prior to leaving. Opportunity for questions and concerns provided.

## 2023-02-09 NOTE — PROGRESS NOTES
Charting and patient care of Bill Sheehan by Chalmers Barthel, RN from 0730 to 2000 was supervised and reviewed by this RN.

## 2023-02-20 ENCOUNTER — OFFICE VISIT (OUTPATIENT)
Dept: INTERNAL MEDICINE CLINIC | Age: 53
End: 2023-02-20
Payer: COMMERCIAL

## 2023-02-20 VITALS
HEART RATE: 73 BPM | WEIGHT: 243 LBS | HEIGHT: 73 IN | OXYGEN SATURATION: 99 % | DIASTOLIC BLOOD PRESSURE: 80 MMHG | TEMPERATURE: 97.8 F | RESPIRATION RATE: 16 BRPM | BODY MASS INDEX: 32.2 KG/M2 | SYSTOLIC BLOOD PRESSURE: 142 MMHG

## 2023-02-20 DIAGNOSIS — Z12.5 PROSTATE CANCER SCREENING: ICD-10-CM

## 2023-02-20 DIAGNOSIS — I48.91 ATRIAL FIBRILLATION, UNSPECIFIED TYPE (HCC): ICD-10-CM

## 2023-02-20 DIAGNOSIS — L30.9 ECZEMA, UNSPECIFIED TYPE: ICD-10-CM

## 2023-02-20 DIAGNOSIS — I10 HYPERTENSION, UNSPECIFIED TYPE: Primary | ICD-10-CM

## 2023-02-20 DIAGNOSIS — Z12.11 COLON CANCER SCREENING: ICD-10-CM

## 2023-02-20 DIAGNOSIS — G47.33 OSA (OBSTRUCTIVE SLEEP APNEA): ICD-10-CM

## 2023-02-20 PROCEDURE — 99203 OFFICE O/P NEW LOW 30 MIN: CPT | Performed by: INTERNAL MEDICINE

## 2023-02-20 RX ORDER — TRIAMCINOLONE ACETONIDE 1 MG/G
CREAM TOPICAL 2 TIMES DAILY
Qty: 15 G | Refills: 0 | Status: SHIPPED | OUTPATIENT
Start: 2023-02-20

## 2023-02-20 RX ORDER — LISINOPRIL 5 MG/1
5 TABLET ORAL DAILY
Qty: 30 TABLET | Refills: 1 | Status: SHIPPED | OUTPATIENT
Start: 2023-02-20

## 2023-02-20 RX ORDER — LISINOPRIL 10 MG/1
TABLET ORAL DAILY
COMMUNITY
End: 2023-02-20

## 2023-02-20 NOTE — PROGRESS NOTES
Health Maintenance Due   Topic Date Due    Hepatitis C Screening  Never done    Depression Screen  Never done    COVID-19 Vaccine (1) Never done    Pneumococcal 0-64 years (1 - PCV) Never done    DTaP/Tdap/Td series (1 - Tdap) Never done    Colorectal Cancer Screening Combo  Never done    Shingles Vaccine (1 of 2) Never done    Flu Vaccine (1) Never done       Chief Complaint   Patient presents with    Establish Care       1. Have you been to the ER, urgent care clinic since your last visit? Hospitalized since your last visit? YES FEB 6, AFIB, [facility]    2. Have you seen or consulted any other health care providers outside of the [address] [place] [address] since your last visit? Include any pap smears or colon screening. No    3) Do you have an Advance Directive on file? no    4) Are you interested in receiving information on Advance Directives? NO      Patient is accompanied by self I have received verbal consent from Yajaira Cee to discuss any/all medical information while they are present in the room.

## 2023-02-20 NOTE — PROGRESS NOTES
HISTORY OF PRESENT ILLNESS  Rula Hines is a 46 y.o. male. Patient was seen to establish care. PMH of HTN and new onset of afib. Patient had gone to the hospital for CP. Patient was found to be on Afib. Patient was given a cardioversion and placed on IV drips. He then was converted. His BP medication was stopped after his BP became low. Previously his BP was in the 150-160s. Patient reports that her BP is better. Can have SOB at times. Had been told in years prior about a aneurysm   to the aorta. This was ruled out with CT of chest. This was negative. Also reports years with eczema. Is asking for something stronger to help with the flares. Has been using hydrocortisone. Patient with LAMINE. Is currently on CPAP. Blood work done in hospital but will need PSA and lipids. Also in need of colon screen. Visit Vitals  BP (!) 142/80 (BP 1 Location: Left upper arm, BP Patient Position: Sitting, BP Cuff Size: Adult)   Pulse 73   Temp 97.8 °F (36.6 °C) (Oral)   Resp 16   Ht 6' 1\" (1.854 m)   Wt 243 lb (110.2 kg)   SpO2 99%   BMI 32.06 kg/m²     Past Medical History:   Diagnosis Date    Hypertension      History reviewed. No pertinent surgical history. No family history on file. Outpatient Encounter Medications as of 2/20/2023   Medication Sig Dispense Refill    lisinopriL (PRINIVIL, ZESTRIL) 5 mg tablet Take 1 Tablet by mouth daily. 30 Tablet 1    triamcinolone acetonide (KENALOG) 0.1 % topical cream Apply  to affected area two (2) times a day. use thin layer 15 g 0    albuterol (PROVENTIL HFA, VENTOLIN HFA, PROAIR HFA) 90 mcg/actuation inhaler Take 2 Puffs by inhalation every four (4) hours as needed. [DISCONTINUED] lisinopriL (PRINIVIL, ZESTRIL) 10 mg tablet Take  by mouth daily. No facility-administered encounter medications on file as of 2/20/2023. Review of Systems   Constitutional: Negative. Respiratory:  Negative for cough and hemoptysis.     Cardiovascular:  Negative for chest pain and palpitations. Gastrointestinal: Negative. Musculoskeletal:  Negative for joint pain. Neurological:  Negative for dizziness and headaches. Psychiatric/Behavioral: Negative. Physical Exam  Vitals and nursing note reviewed. Cardiovascular:      Rate and Rhythm: Normal rate and regular rhythm. Pulmonary:      Effort: Pulmonary effort is normal.      Breath sounds: Normal breath sounds. Abdominal:      Palpations: Abdomen is soft. Musculoskeletal:      Right lower leg: No edema. Left lower leg: No edema. Skin:     General: Skin is warm. Neurological:      Mental Status: He is alert and oriented to person, place, and time. Psychiatric:         Behavior: Behavior normal.       ASSESSMENT and PLAN  Diagnoses and all orders for this visit:    1. Hypertension, unspecified type  -     lisinopriL (PRINIVIL, ZESTRIL) 5 mg tablet; Take 1 Tablet by mouth daily.  -     REFERRAL TO CARDIOLOGY  -     METABOLIC PANEL, COMPREHENSIVE; Future  -     LIPID PANEL; Future    2. Atrial fibrillation, unspecified type ([facility]  -     REFERRAL TO CARDIOLOGY    3. LAMINE (obstructive sleep apnea)    4. Eczema, unspecified type  -     triamcinolone acetonide (KENALOG) 0.1 % topical cream; Apply  to affected area two (2) times a day. use thin layer    5. Prostate cancer screening  -     PSA, DIAGNOSTIC (PROSTATE SPECIFIC AG); Future    6.  Colon cancer screening  -     COLOGUARD TEST (FECAL DNA COLORECTAL CANCER SCREENING)      Follow-up and Dispositions    Return in about 2 weeks (around 3/6/2023), or if symptoms worsen or fail to improve.       lab results and schedule of future lab studies reviewed with patient  reviewed diet, exercise and weight control  cardiovascular risk and specific lipid/LDL goals reviewed  reviewed medications and side effects in detail

## 2023-02-26 ENCOUNTER — APPOINTMENT (OUTPATIENT)
Dept: GENERAL RADIOLOGY | Age: 53
End: 2023-02-26
Attending: EMERGENCY MEDICINE
Payer: COMMERCIAL

## 2023-02-26 ENCOUNTER — HOSPITAL ENCOUNTER (EMERGENCY)
Age: 53
Discharge: HOME | End: 2023-02-26
Attending: EMERGENCY MEDICINE
Payer: COMMERCIAL

## 2023-02-26 VITALS
RESPIRATION RATE: 19 BRPM | HEART RATE: 81 BPM | BODY MASS INDEX: 32.2 KG/M2 | HEIGHT: 73 IN | OXYGEN SATURATION: 93 % | WEIGHT: 243 LBS | SYSTOLIC BLOOD PRESSURE: 90 MMHG | TEMPERATURE: 97.7 F | DIASTOLIC BLOOD PRESSURE: 68 MMHG

## 2023-02-26 DIAGNOSIS — I48.92 ATRIAL FLUTTER WITH RAPID VENTRICULAR RESPONSE (HCC): Primary | ICD-10-CM

## 2023-02-26 DIAGNOSIS — I10 PRIMARY HYPERTENSION: ICD-10-CM

## 2023-02-26 LAB
ALBUMIN SERPL-MCNC: 4.5 G/DL (ref 3.5–5)
ALBUMIN/GLOB SERPL: 1.2 (ref 1.1–2.2)
ALP SERPL-CCNC: 70 U/L (ref 45–117)
ALT SERPL-CCNC: 65 U/L (ref 12–78)
ANION GAP SERPL CALC-SCNC: 13 MMOL/L (ref 5–15)
AST SERPL-CCNC: 48 U/L (ref 15–37)
BASOPHILS # BLD: 0 K/UL (ref 0–0.1)
BASOPHILS NFR BLD: 1 % (ref 0–1)
BILIRUB SERPL-MCNC: 0.8 MG/DL (ref 0.2–1)
BNP SERPL-MCNC: 1068 PG/ML (ref 0–125)
BUN SERPL-MCNC: 16 MG/DL (ref 6–20)
BUN/CREAT SERPL: 12 (ref 12–20)
CALCIUM SERPL-MCNC: 8.9 MG/DL (ref 8.5–10.1)
CHLORIDE SERPL-SCNC: 103 MMOL/L (ref 97–108)
CO2 SERPL-SCNC: 24 MMOL/L (ref 21–32)
COMMENT, HOLDF: NORMAL
CREAT SERPL-MCNC: 1.36 MG/DL (ref 0.7–1.3)
DIFFERENTIAL METHOD BLD: ABNORMAL
EOSINOPHIL # BLD: 0.1 K/UL (ref 0–0.4)
EOSINOPHIL NFR BLD: 2 % (ref 0–7)
ERYTHROCYTE [DISTWIDTH] IN BLOOD BY AUTOMATED COUNT: 13.9 % (ref 11.5–14.5)
GLOBULIN SER CALC-MCNC: 3.7 G/DL (ref 2–4)
GLUCOSE SERPL-MCNC: 149 MG/DL (ref 65–100)
HCT VFR BLD AUTO: 47.2 % (ref 36.6–50.3)
HGB BLD-MCNC: 16.8 G/DL (ref 12.1–17)
IMM GRANULOCYTES # BLD AUTO: 0.1 K/UL (ref 0–0.04)
IMM GRANULOCYTES NFR BLD AUTO: 1 % (ref 0–0.5)
LYMPHOCYTES # BLD: 1.6 K/UL (ref 0.8–3.5)
LYMPHOCYTES NFR BLD: 26 % (ref 12–49)
MAGNESIUM SERPL-MCNC: 1.8 MG/DL (ref 1.6–2.4)
MCH RBC QN AUTO: 29.6 PG (ref 26–34)
MCHC RBC AUTO-ENTMCNC: 35.6 G/DL (ref 30–36.5)
MCV RBC AUTO: 83.2 FL (ref 80–99)
MONOCYTES # BLD: 0.4 K/UL (ref 0–1)
MONOCYTES NFR BLD: 7 % (ref 5–13)
NEUTS SEG # BLD: 4.1 K/UL (ref 1.8–8)
NEUTS SEG NFR BLD: 63 % (ref 32–75)
NRBC # BLD: 0 K/UL (ref 0–0.01)
NRBC BLD-RTO: 0 PER 100 WBC
PLATELET # BLD AUTO: 183 K/UL (ref 150–400)
PMV BLD AUTO: 11.3 FL (ref 8.9–12.9)
POTASSIUM SERPL-SCNC: 3.5 MMOL/L (ref 3.5–5.1)
PROT SERPL-MCNC: 8.2 G/DL (ref 6.4–8.2)
RBC # BLD AUTO: 5.67 M/UL (ref 4.1–5.7)
SAMPLES BEING HELD,HOLD: NORMAL
SODIUM SERPL-SCNC: 140 MMOL/L (ref 136–145)
TROPONIN I SERPL HS-MCNC: 62 NG/L (ref 0–76)
TSH SERPL DL<=0.05 MIU/L-ACNC: 3.39 UIU/ML (ref 0.36–3.74)
WBC # BLD AUTO: 6.3 K/UL (ref 4.1–11.1)

## 2023-02-26 PROCEDURE — 85025 COMPLETE CBC W/AUTO DIFF WBC: CPT

## 2023-02-26 PROCEDURE — 96376 TX/PRO/DX INJ SAME DRUG ADON: CPT

## 2023-02-26 PROCEDURE — 93005 ELECTROCARDIOGRAM TRACING: CPT

## 2023-02-26 PROCEDURE — 83735 ASSAY OF MAGNESIUM: CPT

## 2023-02-26 PROCEDURE — 83880 ASSAY OF NATRIURETIC PEPTIDE: CPT

## 2023-02-26 PROCEDURE — 71045 X-RAY EXAM CHEST 1 VIEW: CPT

## 2023-02-26 PROCEDURE — 74011250637 HC RX REV CODE- 250/637: Performed by: EMERGENCY MEDICINE

## 2023-02-26 PROCEDURE — 74011250636 HC RX REV CODE- 250/636: Performed by: EMERGENCY MEDICINE

## 2023-02-26 PROCEDURE — 96361 HYDRATE IV INFUSION ADD-ON: CPT

## 2023-02-26 PROCEDURE — 84443 ASSAY THYROID STIM HORMONE: CPT

## 2023-02-26 PROCEDURE — 96365 THER/PROPH/DIAG IV INF INIT: CPT

## 2023-02-26 PROCEDURE — 96375 TX/PRO/DX INJ NEW DRUG ADDON: CPT

## 2023-02-26 PROCEDURE — 84484 ASSAY OF TROPONIN QUANT: CPT

## 2023-02-26 PROCEDURE — 74011000250 HC RX REV CODE- 250: Performed by: EMERGENCY MEDICINE

## 2023-02-26 PROCEDURE — 80053 COMPREHEN METABOLIC PANEL: CPT

## 2023-02-26 PROCEDURE — 99285 EMERGENCY DEPT VISIT HI MDM: CPT

## 2023-02-26 RX ORDER — DILTIAZEM HYDROCHLORIDE 5 MG/ML
10 INJECTION INTRAVENOUS
Status: COMPLETED | OUTPATIENT
Start: 2023-02-26 | End: 2023-02-26

## 2023-02-26 RX ORDER — METOPROLOL TARTRATE 25 MG/1
25 TABLET, FILM COATED ORAL 2 TIMES DAILY
Qty: 60 TABLET | Refills: 0 | Status: SHIPPED | OUTPATIENT
Start: 2023-02-26 | End: 2023-03-28

## 2023-02-26 RX ORDER — METOPROLOL TARTRATE 25 MG/1
12.5 TABLET, FILM COATED ORAL 2 TIMES DAILY
Qty: 30 TABLET | Refills: 0 | Status: SHIPPED | OUTPATIENT
Start: 2023-02-26

## 2023-02-26 RX ORDER — FUROSEMIDE 10 MG/ML
40 INJECTION INTRAMUSCULAR; INTRAVENOUS
Status: COMPLETED | OUTPATIENT
Start: 2023-02-26 | End: 2023-02-26

## 2023-02-26 RX ORDER — METOPROLOL TARTRATE 50 MG/1
50 TABLET ORAL
Status: COMPLETED | OUTPATIENT
Start: 2023-02-26 | End: 2023-02-26

## 2023-02-26 RX ORDER — ETOMIDATE 2 MG/ML
10 INJECTION INTRAVENOUS ONCE
Status: COMPLETED | OUTPATIENT
Start: 2023-02-26 | End: 2023-02-26

## 2023-02-26 RX ORDER — DILTIAZEM HYDROCHLORIDE 5 MG/ML
15 INJECTION INTRAVENOUS
Status: DISCONTINUED | OUTPATIENT
Start: 2023-02-26 | End: 2023-02-26

## 2023-02-26 RX ORDER — MAGNESIUM SULFATE HEPTAHYDRATE 40 MG/ML
2 INJECTION, SOLUTION INTRAVENOUS ONCE
Status: COMPLETED | OUTPATIENT
Start: 2023-02-26 | End: 2023-02-26

## 2023-02-26 RX ORDER — DILTIAZEM HYDROCHLORIDE 5 MG/ML
15 INJECTION INTRAVENOUS
Status: COMPLETED | OUTPATIENT
Start: 2023-02-26 | End: 2023-02-26

## 2023-02-26 RX ORDER — ONDANSETRON 2 MG/ML
4 INJECTION INTRAMUSCULAR; INTRAVENOUS
Status: COMPLETED | OUTPATIENT
Start: 2023-02-26 | End: 2023-02-26

## 2023-02-26 RX ADMIN — DILTIAZEM HYDROCHLORIDE 10 MG: 5 INJECTION, SOLUTION INTRAVENOUS at 06:54

## 2023-02-26 RX ADMIN — DILTIAZEM HYDROCHLORIDE 15 MG: 5 INJECTION, SOLUTION INTRAVENOUS at 06:13

## 2023-02-26 RX ADMIN — ONDANSETRON 4 MG: 2 INJECTION INTRAMUSCULAR; INTRAVENOUS at 07:42

## 2023-02-26 RX ADMIN — SODIUM CHLORIDE 500 ML: 9 INJECTION, SOLUTION INTRAVENOUS at 09:01

## 2023-02-26 RX ADMIN — ETOMIDATE INJECTION 10 MG: 2 SOLUTION INTRAVENOUS at 08:13

## 2023-02-26 RX ADMIN — MAGNESIUM SULFATE HEPTAHYDRATE 2 G: 40 INJECTION, SOLUTION INTRAVENOUS at 06:12

## 2023-02-26 RX ADMIN — SODIUM CHLORIDE 500 ML: 9 INJECTION, SOLUTION INTRAVENOUS at 07:51

## 2023-02-26 RX ADMIN — SODIUM CHLORIDE 500 ML: 9 INJECTION, SOLUTION INTRAVENOUS at 06:13

## 2023-02-26 RX ADMIN — FUROSEMIDE 40 MG: 10 INJECTION, SOLUTION INTRAMUSCULAR; INTRAVENOUS at 07:00

## 2023-02-26 RX ADMIN — METOPROLOL TARTRATE 50 MG: 50 TABLET, FILM COATED ORAL at 06:32

## 2023-02-26 NOTE — ED NOTES
Patient decreased respiratory rate, patient nasal oxygen from 2L to 4L. Patient O2 dropped to 55-60% and patient bagged for approximately 40 seconds until becoming slightly more alert. Provider at bedside.

## 2023-02-26 NOTE — ED NOTES
Patient cardioverted at this time. Provider at bedside, time out performed. Patient unresponsive after ordered etomidate, tolerated procedure well. Patient convert from aflutter to sinus rhythm.

## 2023-02-26 NOTE — ED NOTES
Patient request to use urinal at this time. Patient education provided regarding use of urinal and current cardiac rhythm. Patient in no acute distress at present time.

## 2023-02-26 NOTE — ED NOTES
When pt is moving or talking on his cell his HR increased to 140-150. MD notified and orders to follow.

## 2023-02-26 NOTE — ED NOTES
Verbal shift change report given to MADY RN (oncoming nurse) by Nereida Reyes RN  (offgoing nurse). Report included the following information SBAR, ED Summary, Intake/Output, MAR, and Recent Results.

## 2023-02-26 NOTE — ED PROVIDER NOTES
[facility] EMERGENCY DEPT  EMERGENCY DEPARTMENT ENCOUNTER       Pt Name: Samira Wooten  MRN: [id number]  [place] 1970  Date of evaluation: 2/26/2023  Provider: Tricia Pineda DO   PCP: Abner Rinne, NP  Note Started: 6:07 AM 2/26/23     CHIEF COMPLAINT       Chief Complaint   Patient presents with    Chest Pain        HISTORY OF PRESENT ILLNESS: 1 or more elements      History From: Patient, History limited by: none     Samira Wooten is a 46 y.o. male with past medical history significant for hypertension, recent new diagnosis of atrial flutter status post ACE cardioversion during last hospitalization was admitted to [facility] on 2/6 for atrial flutter and mild diastolic heart failure relieved with a flutter. He was not discharged on a beta-blocker, not discharged on the anticoagulation. States symptoms started around 130 this morning. Yesterday he was feeling a little bit congested and last night took some NyQuil. Patient denies any fever. Denies any lower extremity edema. Nothing makes his symptoms better or worse other than walking makes his shortness of breath worse. Please See MDM for Additional Details of the HPI/PMH  Nursing Notes were all reviewed and agreed with or any disagreements were addressed in the HPI. REVIEW OF SYSTEMS        Positives and Pertinent negatives as per HPI. PAST HISTORY     Past Medical History:  Past Medical History:   Diagnosis Date    Hypertension        Past Surgical History:  No past surgical history on file. Family History:  History reviewed. No pertinent family history. Social History:  Social History     Tobacco Use    Smoking status: Never    Smokeless tobacco: Never   Substance Use Topics    Alcohol use: Yes     Alcohol/week: 6.0 standard drinks     Types: 6 Shots of liquor per week    Drug use: Never       Allergies:   Allergies   Allergen Reactions    Shellfish Derived Anaphylaxis       CURRENT MEDICATIONS      Previous Medications    ALBUTEROL (PROVENTIL HFA, VENTOLIN HFA, PROAIR HFA) 90 MCG/ACTUATION INHALER    Take 2 Puffs by inhalation every four (4) hours as needed. LISINOPRIL (PRINIVIL, ZESTRIL) 5 MG TABLET    Take 1 Tablet by mouth daily. TRIAMCINOLONE ACETONIDE (KENALOG) 0.1 % TOPICAL CREAM    Apply  to affected area two (2) times a day. use thin layer       SCREENINGS               No data recorded         PHYSICAL EXAM      ED Triage Vitals [02/26/23 0557]   ED Encounter Vitals Group      BP (!) 177/136      Pulse (Heart Rate) (!) 144      Resp Rate 16      Temp       Temp src       O2 Sat (%) 95 %      Weight 243 lb      Height 6' 1\"        Physical Exam  Vitals and nursing note reviewed. Constitutional:       Appearance: He is well-developed. HENT:      Head: Normocephalic and atraumatic. Eyes:      General:         Right eye: No discharge. Left eye: No discharge. Conjunctiva/sclera: Conjunctivae normal.      Pupils: Pupils are equal, round, and reactive to light. Neck:      Trachea: No tracheal deviation. Cardiovascular:      Rate and Rhythm: Regular rhythm. Tachycardia present. Heart sounds: Normal heart sounds. No murmur heard. Pulmonary:      Effort: Pulmonary effort is normal. No respiratory distress. Breath sounds: Normal breath sounds. No wheezing or rales. Abdominal:      General: Bowel sounds are normal.      Palpations: Abdomen is soft. Tenderness: There is no abdominal tenderness. There is no guarding or rebound. Musculoskeletal:         General: No tenderness or deformity. Normal range of motion. Cervical back: Normal range of motion and neck supple. Skin:     General: Skin is warm and dry. Findings: No erythema or rash. Neurological:      Mental Status: He is alert and oriented to person, place, and time.    Psychiatric:         Behavior: Behavior normal.        DIAGNOSTIC RESULTS   LABS:     Recent Results (from the past 12 hour(s))   EKG, 12 LEAD, INITIAL    Collection Time: 02/26/23  6:01 AM   Result Value Ref Range    Ventricular Rate 143 BPM    Atrial Rate 143 BPM    P-R Interval 86 ms    QRS Duration 150 ms    Q-T Interval 294 ms    QTC Calculation (Bezet) 453 ms    Calculated P Axis 91 degrees    Calculated R Axis 4 degrees    Calculated T Axis 159 degrees    Diagnosis       Sinus tachycardia with short MA  Nonspecific intraventricular block  Abnormal ECG  When compared with ECG of 08-FEB-2023 09:47,  premature atrial complexes are no longer present  Vent. rate has increased BY  57 BPM  Left posterior fascicular block is no longer present  ST elevation now present in Anterior leads  Nonspecific T wave abnormality now evident in Inferior leads     SAMPLES BEING HELD    Collection Time: 02/26/23  6:10 AM   Result Value Ref Range    SAMPLES BEING HELD BLUE, RED     COMMENT        Add-on orders for these samples will be processed based on acceptable specimen integrity and analyte stability, which may vary by analyte. CBC WITH AUTOMATED DIFF    Collection Time: 02/26/23  6:10 AM   Result Value Ref Range    WBC 6.3 4.1 - 11.1 K/uL    RBC 5.67 4.10 - 5.70 M/uL    HGB 16.8 12.1 - 17.0 g/dL    HCT 47.2 36.6 - 50.3 %    MCV 83.2 80.0 - 99.0 FL    MCH 29.6 26.0 - 34.0 PG    MCHC 35.6 30.0 - 36.5 g/dL    RDW 13.9 11.5 - 14.5 %    PLATELET 322 093 - 892 K/uL    MPV 11.3 8.9 - 12.9 FL    NRBC 0.0 0  WBC    ABSOLUTE NRBC 0.00 0.00 - 0.01 K/uL    NEUTROPHILS 63 32 - 75 %    LYMPHOCYTES 26 12 - 49 %    MONOCYTES 7 5 - 13 %    EOSINOPHILS 2 0 - 7 %    BASOPHILS 1 0 - 1 %    IMMATURE GRANULOCYTES 1 (H) 0.0 - 0.5 %    ABS. NEUTROPHILS 4.1 1.8 - 8.0 K/UL    ABS. LYMPHOCYTES 1.6 0.8 - 3.5 K/UL    ABS. MONOCYTES 0.4 0.0 - 1.0 K/UL    ABS. EOSINOPHILS 0.1 0.0 - 0.4 K/UL    ABS. BASOPHILS 0.0 0.0 - 0.1 K/UL    ABS. IMM.  GRANS. 0.1 (H) 0.00 - 0.04 K/UL    DF AUTOMATED     METABOLIC PANEL, COMPREHENSIVE    Collection Time: 02/26/23  6:10 AM   Result Value Ref Range    Sodium 140 136 - 145 mmol/L    Potassium 3.5 3.5 - 5.1 mmol/L    Chloride 103 97 - 108 mmol/L    CO2 24 21 - 32 mmol/L    Anion gap 13 5 - 15 mmol/L    Glucose 149 (H) 65 - 100 mg/dL    BUN 16 6 - 20 MG/DL    Creatinine 1.36 (H) 0.70 - 1.30 MG/DL    BUN/Creatinine ratio 12 12 - 20      eGFR >60 >60 ml/min/1.73m2    Calcium 8.9 8.5 - 10.1 MG/DL    Bilirubin, total 0.8 0.2 - 1.0 MG/DL    ALT (SGPT) 65 12 - 78 U/L    AST (SGOT) 48 (H) 15 - 37 U/L    Alk. phosphatase 70 45 - 117 U/L    Protein, total 8.2 6.4 - 8.2 g/dL    Albumin 4.5 3.5 - 5.0 g/dL    Globulin 3.7 2.0 - 4.0 g/dL    A-G Ratio 1.2 1.1 - 2.2     NT-PRO BNP    Collection Time: 02/26/23  6:10 AM   Result Value Ref Range    NT pro-BNP 1,068 (H) 0 - 125 PG/ML   MAGNESIUM    Collection Time: 02/26/23  6:10 AM   Result Value Ref Range    Magnesium 1.8 1.6 - 2.4 mg/dL   TROPONIN-HIGH SENSITIVITY    Collection Time: 02/26/23  6:10 AM   Result Value Ref Range    Troponin-High Sensitivity 62 0 - 76 ng/L   TSH 3RD GENERATION    Collection Time: 02/26/23  6:10 AM   Result Value Ref Range    TSH 3.39 0.36 - 3.74 uIU/mL   EKG, 12 LEAD, SUBSEQUENT    Collection Time: 02/26/23  8:23 AM   Result Value Ref Range    Ventricular Rate 84 BPM    Atrial Rate 84 BPM    P-R Interval 208 ms    QRS Duration 96 ms    Q-T Interval 404 ms    QTC Calculation (Bezet) 477 ms    Calculated P Axis 67 degrees    Calculated R Axis 148 degrees    Calculated T Axis 82 degrees    Diagnosis       Normal sinus rhythm  Possible Left atrial enlargement  Right axis deviation  Incomplete right bundle branch block  Cannot rule out Anterior infarct , age undetermined  Abnormal ECG  When compared with ECG of 26-FEB-2023 06:01,  MANUAL COMPARISON REQUIRED, DATA IS UNCONFIRMED          EKG:  If performed, independent interpretation documented below in the MDM section     RADIOLOGY:  Non-plain film images such as CT, Ultrasound and MRI are read by the radiologist. Plain radiographic images are visualized and preliminarily interpreted by the ED Provider with the findings documented in the MDM section. Interpretation per the Radiologist below, if available at the time of this note:     XR CHEST PORT    Result Date: 2/26/2023  INDICATION: shortness of breath EXAM:  AP CHEST RADIOGRAPH COMPARISON: February 6, 2023 FINDINGS: AP portable view of the chest demonstrates a normal cardiomediastinal silhouette. There is no edema, effusion, consolidation, or pneumothorax. The osseous structures are unremarkable. No acute process. CRITICAL CARE TIME   CRITICAL CARE NOTE :    11:15 AM    IMPENDING DETERIORATION -Cardiovascular    ASSOCIATED RISK FACTORS - Hypotension and Dysrhythmia    MANAGEMENT- Bedside Assessment    INTERPRETATION -  ECG and Blood Pressure    INTERVENTIONS - hemodynamic mngmt and defibrillation    CASE REVIEW - Medical Sub-Specialist, Nursing, and Family    TREATMENT RESPONSE -Improved    PERFORMED BY - Self    NOTES   :    I personally spent 65 minutes of critical care time with this patient. This is time spent at this critically ill patient's bedside actively involved in patient care as well as the coordination of care and discussions with the patient's family. This includes time involved in examination of patient, ordering and reviewing of laboratory and imaging studies, cardiac monitoring, pulse oximetry, re-evaluation of patient's condition, evaluation of patient's response to treatment, ordering and performing treatments and interventions, review of old charts, consultations with specialist, discussions with family regarding pertinent collateral history and plan of care, bedside attention and documentation. During this entire length of time I was immediately available to the patient. This does not include time spent on separately reported billable procedures. Critical Care:  The reason for providing this level of medical care for this critically-ill patient was due to a critical illness that impaired one or more vital organ systems, such that there was a high probability of imminent or life-threatening deterioration in the patient's condition. This care involved the highest level of preparedness to intervene urgently. This care involved high complexity decision making to assess, manipulate, and support vital system functions, to treat this degree of vital organ system failure, and to prevent further life threatening deterioration of the patients condition requiring frequent assessments and interventions.     Karla Young MD    EMERGENCY DEPARTMENT COURSE and DIFFERENTIAL DIAGNOSIS/MDM   Vitals:    Patient Vitals for the past 24 hrs:   Temp Pulse Resp BP SpO2   02/26/23 1030 -- 80 16 99/74 97 %   02/26/23 1027 -- (!) 107 19 100/75 90 %   02/26/23 1017 -- 82 20 (!) 82/69 97 %   02/26/23 1002 -- 82 15 (!) 88/74 98 %   02/26/23 0955 -- -- -- (!) 84/58 --   02/26/23 0905 -- 72 14 91/63 98 %   02/26/23 0902 -- 74 15 -- 98 %   02/26/23 0901 -- 74 -- (!) 87/72 97 %   02/26/23 0855 -- 75 14 (!) 86/69 98 %   02/26/23 0850 -- 76 15 (!) 88/49 99 %   02/26/23 0848 -- 76 19 (!) 89/63 100 %   02/26/23 0845 -- 76 21 94/77 100 %   02/26/23 0840 -- 77 14 94/80 99 %   02/26/23 0835 -- 78 17 95/74 99 %   02/26/23 0830 -- 78 18 109/74 98 %   02/26/23 0825 -- 81 21 102/85 97 %   02/26/23 0823 -- 85 16 104/87 98 %   02/26/23 0820 -- 87 12 (!) 97/57 (!) 83 %   02/26/23 0818 -- 79 -- 97/81 (!) 80 %   02/26/23 0816 -- 82 -- 102/80 97 %   02/26/23 0815 -- (!) 108 9 92/64 97 %   02/26/23 0813 -- (!) 127 -- (!) 82/66 100 %   02/26/23 0810 -- (!) 119 11 (!) 76/55 98 %   02/26/23 0806 -- (!) 119 17 93/68 98 %   02/26/23 0800 -- (!) 129 14 92/71 99 %   02/26/23 0758 -- (!) 130 13 (!) 64/47 98 %   02/26/23 0755 -- 97 13 (!) 64/54 98 %   02/26/23 0750 -- 66 15 (!) 75/27 98 %   02/26/23 0747 -- 66 17 (!) 78/59 98 %   02/26/23 0745 -- 66 17 (!) 86/77 99 %   02/26/23 0732 -- 67 13 99/69 97 %   02/26/23 0717 -- 68 15 (!) 134/122 98 % 02/26/23 0702 -- 92 23 (!) 143/112 95 %   02/26/23 0655 -- -- 17 -- 94 %   02/26/23 0654 -- (!) 130 19 -- 96 %   02/26/23 0653 -- (!) 122 16 -- 97 %   02/26/23 0652 -- 94 13 -- 97 %   02/26/23 0651 -- 99 19 -- 96 %   02/26/23 0648 -- (!) 141 18 -- 98 %   02/26/23 0645 -- (!) 117 18 (!) 160/106 98 %   02/26/23 0632 -- 100 22 (!) 149/112 98 %   02/26/23 0629 -- 100 20 -- 97 %   02/26/23 0622 -- (!) 105 16 -- 97 %   02/26/23 0613 -- (!) 143 -- (!) 195/137 --   02/26/23 0604 -- -- -- -- 98 %   02/26/23 0557 97.7 °F (36.5 °C) (!) 144 16 (!) 177/136 95 %      Patient was given the following medications:  Medications   dilTIAZem (CARDIZEM) injection 15 mg (15 mg IntraVENous Given 2/26/23 5076)   magnesium sulfate 2 g/50 ml IVPB (premix or compounded) (0 g IntraVENous IV Completed 2/26/23 0700)   sodium chloride 0.9 % bolus infusion 500 mL (0 mL IntraVENous IV Completed 2/26/23 0639)   metoprolol tartrate (LOPRESSOR) tablet 50 mg (50 mg Oral Given 2/26/23 6072)   furosemide (LASIX) injection 40 mg (40 mg IntraVENous Given 2/26/23 0700)   dilTIAZem (CARDIZEM) injection 10 mg (10 mg IntraVENous Given 2/26/23 0654)   ondansetron (ZOFRAN) injection 4 mg (4 mg IntraVENous Given 2/26/23 0742)   sodium chloride 0.9 % bolus infusion 500 mL (0 mL IntraVENous IV Completed 2/26/23 0825)   etomidate (AMIDATE) 2 mg/mL injection 10 mg (10 mg IntraVENous Given 2/26/23 0813)   sodium chloride 0.9 % bolus infusion 500 mL (0 mL IntraVENous IV Completed 2/26/23 1025)       Medical Decision Making  Patient presents with recurrence of his atrial flutter. Appears to be in a 2-1. He was recently hospitalized at [facility]. I have reviewed those records, cardiology notes, discharge summary. He was not started on a AV tisha blocking agent at time of discharge, was not anticoagulated. Is not clear from their notes as to why no rate control agents were started and why patient was not anticoagulated. He did well after his ACE cardioversion. He knows exactly when his heart rate started, he has checked his watch as well and states that symptoms started at 130. His watch does have a heart rate monitor. He appears nontoxic here on exam, he is hypertensive. We will give some rate control agents with IV diltiazem boluses, can repeat several times, if not improving will start a drip. I discussed the possibility of synchronized cardioversion with the patient. We discussed risk benefits and alternatives. Described the risk of thrombolic event if symptoms duration greater than 24 hours. Described it is low risk if symptoms less than 24 hours. Patient insistent that symptoms started at 1:30 AM this morning. He would feel comfortable proceeding with a synchronized cardioversion if his heart rate is not improving. The other option would be to admit the patient for IV rate control    Amount and/or Complexity of Data Reviewed  External Data Reviewed: notes. Details: Cardiology2/6, 2/7 and discharge summary from 2/9 reviewed, patient atrial flutter with 2 1 AV block. He had a ACE cardioversion done. He was not started on any rate control agents, not started on anticoagulation. Labs: ordered. Radiology: ordered. ECG/medicine tests: ordered and independent interpretation performed. Details: EKG shows atrial flutter, rate 143. No evidence of ST elevation myocardial infarction. Risk  Prescription drug management. ED Course as of 02/26/23 1115   Sun Feb 26, 2023   9070 Patient still in atrial flutter, rate more controlled now 114. We will give another dose of diltiazem. Given oral metoprolol. Disposition pending rate control. If he remains in a tachycardic rate and not showing signs of improvement may need to consider hospitalization for IV rate control, possibly some gentle diuresis. [AR]   1878 Patient signed out to Dr. Sujit Bravo. Pending rate control vs conversion to sinus rhythm.  Described that I already discussed possible synchronized cardioversion with the patient. May be reasonable to discuss with cardiology prior to proceeding. May need to assess their desire to anticoagulate patient. [id number] of 1. [AR]   0400 Patient heart rate has improved however he is now hypotensive with a blood pressure of 70/55. He is complaining of feeling anxious and is diaphoretic. Will treat with 500 mL of IV fluids at this time and discuss with cardiology. [MS]   0802 Patient's repeat blood pressure is 65/50 and he is feeling more diaphoretic and anxious. He is also complaining of increased nausea. Offered patient synchronized cardioversion and explained risk and benefits. Patient would like to move forward with a cardioversion at this time. [MS]   7016 Procedure Note - Cardioversion:   Performed by Monica Harris MD .     Immediately prior to the procedure, the patient was reevaluated and found suitable for the planned procedure and any planned medications. Immediately prior to the procedure a time out was called to verify the correct patient, procedure, equipment, staff, and marking as appropriate. Cardioversion was indicated for a rhythm of unstable tachycardia and performed 1 times with a maximum delivered energy of 200 joules,  biphasic. Adequate procedural sedation was attained, see moderate sedation record. Patient's rhythm was normal sinus rhythm at the end of the procedure. Procedure was tolerated well. [MS]   0498 Patient received synchronized cardioversion for unstable A-flutter. He is now back in normal sinus rhythm with a rate of 79. Will discuss with cardiology. [MS]   0713 ED EKG interpretation:  Rhythm: normal sinus rhythm, incomplete right bundle branch block; and regular . Rate (approx.): 84; Axis: left axis deviation; P wave: normal; QRS interval: normal ; ST/T wave: non-specific changes; Other findings: abnormal ekg. This EKG was interpreted by Monica Hraris MD,ED Provider.    [MS]   0488 8:28 AM  Johanna Cifuentes MD spoke with Dr. Natalia Kirkpatrick, Consult for Cardiology. Discussed HPI and PE, available diagnostic tests and clinical findings. He is in agreement with care plans as outlined. He states that his blood pressure improves and he remains stable, he can be started on Eliquis and a low-dose beta-blocker (metoprolol 25 mg twice daily) and can follow-up as an outpatient with Dr. Urbano Caal. If his blood pressure does not stabilize, he recommends transferring to [facility]. [MS]   6478 Patient's heart rate remains in the mid 70s and he reports feeling back to his baseline, however his blood pressure is still 88/49. Will administer another 500 mL of normal saline and reassess. If his blood pressure does not stabilize, patient will need to be transferred. [MS]   1004 Patient states he feels back to 100% after cardioversion. His blood pressure remains low at 88/75. If his blood pressure does not improve, will need to transfer him to [facility]. [MS]   1111 Patient's blood pressure has stabilized in the mid to upper 74S systolic. He has ambulated without lightheadedness or dizziness and feels well enough for discharge. He agrees to follow-up with Dr. Inga Anderson at his scheduled appointment this week and return if he develops any new symptoms. [MS]      ED Course User Index  [AR] Natalia Dinh DO  [MS] Sanjeev Lambert MD         FINAL IMPRESSION     1. Atrial flutter with rapid ventricular response ([address]    2. Primary hypertension          DISPOSITION/PLAN   Nathanael Severino  results have been reviewed with him. He has been counseled regarding his diagnosis, treatment, and plan. He verbally conveys understanding and agreement of the signs, symptoms, diagnosis, treatment and prognosis and additionally agrees to follow up as discussed. He also agrees with the care-plan and conveys that all of his questions have been answered.   I have also provided discharge instructions for him that include: educational information regarding their diagnosis and treatment, and list of reasons why they would want to return to the ED prior to their follow-up appointment, should his condition change. CLINICAL IMPRESSION    Discharge Note: The patient is stable for discharge home. The signs, symptoms, diagnosis, and discharge instructions have been discussed, understanding conveyed, and agreed upon. The patient is to follow up as recommended or return to ER should their symptoms worsen. PATIENT REFERRED TO:  Follow-up Information       Follow up With Specialties Details Why Contact Deni Sutton NP Nurse Practitioner, Bariatrics Schedule an appointment as soon as possible for a visit   [address] [place] [address] [address]  [phone]      Iliana Ochoa, [facility] Vascular Surgery, Cardiovascular Disease Physician, Interventional Cardiology Physician Go to  this week at your scheduled appointment [address] [place] [address]  [address] [address]  [phone]      [facility] EMERGENCY DEPT Emergency Medicine  As needed, If symptoms worsen Flo Jessica  [phone]              DISCHARGE MEDICATIONS:  Current Discharge Medication List        START taking these medications    Details   !! metoprolol tartrate (LOPRESSOR) 25 mg tablet Take 1 Tablet by mouth two (2) times a day for 30 days. Qty: 60 Tablet, Refills: 0  Start date: 2/26/2023, End date: 3/28/2023      !! metoprolol tartrate (LOPRESSOR) 25 mg tablet Take 0.5 Tablets by mouth two (2) times a day. Qty: 30 Tablet, Refills: 0  Start date: 2/26/2023      apixaban (Eliquis) 5 mg tablet Take 1 Tablet by mouth two (2) times a day for 30 days. Qty: 60 Tablet, Refills: 0  Start date: 2/26/2023, End date: 3/28/2023       !! - Potential duplicate medications found. Please discuss with provider. DISCONTINUED MEDICATIONS:  Current Discharge Medication List          I am the Primary Clinician of Record.    Kymberly Falk MD (electronically signed)    (Please note that parts of this dictation were completed with voice recognition software. Quite often unanticipated grammatical, syntax, homophones, and other interpretive errors are inadvertently transcribed by the computer software. Please disregards these errors.  Please excuse any errors that have escaped final proofreading.)

## 2023-02-26 NOTE — ED NOTES
Patient (s)  given copy of dc instructions and 2 script(s). Patient (s)  verbalized understanding of instructions and script (s). Patient given a current medication reconciliation form and verbalized understanding of their medications. Patient (s) verbalized understanding of the importance of discussing medications with  his or her physician or clinic they will be following up with. Patient alert and oriented and in no acute distress. Patient discharged home ambulatory with family member. Originally written for two 25 mg metoprolol prescriptions. Dr. Ana Paula Mendiola scratching out the one to not take and wrote on papers \"do not take\". Patient is to take 1/2 tablet of 25 mg metoprolol. Dr. Ana Paula Mendiola writing on discharge instructions \"do take\" and RN circling take 1/2 tablet as instructed. Patient voices understanding of instructions and will follow up with cardiology this week.

## 2023-02-26 NOTE — ED NOTES
Provider at bedside. Patient reports feeling worse, reports he feels too hot and sweaty. Patient agreeable to cardioversion at this time.

## 2023-02-26 NOTE — ED NOTES
Pt reports CP/sob that began earlier in the morning around 0130. Pt stated he took some benedryl d/t feeling congested and concerned it may have triggered his heart. Pt was at [facility] 2 weeks ago d/t the same issue and was converted.

## 2023-02-27 LAB
ATRIAL RATE: 143 BPM
ATRIAL RATE: 84 BPM
CALCULATED P AXIS, ECG09: 67 DEGREES
CALCULATED P AXIS, ECG09: 91 DEGREES
CALCULATED R AXIS, ECG10: 148 DEGREES
CALCULATED R AXIS, ECG10: 4 DEGREES
CALCULATED T AXIS, ECG11: 159 DEGREES
CALCULATED T AXIS, ECG11: 82 DEGREES
DIAGNOSIS, 93000: NORMAL
DIAGNOSIS, 93000: NORMAL
P-R INTERVAL, ECG05: 208 MS
P-R INTERVAL, ECG05: 86 MS
Q-T INTERVAL, ECG07: 294 MS
Q-T INTERVAL, ECG07: 404 MS
QRS DURATION, ECG06: 150 MS
QRS DURATION, ECG06: 96 MS
QTC CALCULATION (BEZET), ECG08: 453 MS
QTC CALCULATION (BEZET), ECG08: 477 MS
VENTRICULAR RATE, ECG03: 143 BPM
VENTRICULAR RATE, ECG03: 84 BPM

## 2023-02-28 ENCOUNTER — TELEPHONE (OUTPATIENT)
Dept: INTERNAL MEDICINE CLINIC | Age: 53
End: 2023-02-28

## 2023-02-28 NOTE — TELEPHONE ENCOUNTER
Patient called and had shock therapy to his heat on Sunday and now has a cold and is asking what OTC can he take.

## 2023-03-01 ENCOUNTER — TELEPHONE (OUTPATIENT)
Dept: INTERNAL MEDICINE CLINIC | Age: 53
End: 2023-03-01

## 2023-03-01 NOTE — TELEPHONE ENCOUNTER
Needs a letter stating he has the all clear to return to work already went back to work but they need a documented letter stating he was ok to return to work with no restrictions

## 2023-03-02 ENCOUNTER — APPOINTMENT (OUTPATIENT)
Dept: GENERAL RADIOLOGY | Age: 53
End: 2023-03-02
Attending: EMERGENCY MEDICINE
Payer: COMMERCIAL

## 2023-03-02 ENCOUNTER — HOSPITAL ENCOUNTER (EMERGENCY)
Age: 53
Discharge: HOME | End: 2023-03-02
Attending: EMERGENCY MEDICINE
Payer: COMMERCIAL

## 2023-03-02 VITALS
HEIGHT: 73 IN | RESPIRATION RATE: 20 BRPM | HEART RATE: 82 BPM | WEIGHT: 240 LBS | SYSTOLIC BLOOD PRESSURE: 143 MMHG | TEMPERATURE: 97.7 F | BODY MASS INDEX: 31.81 KG/M2 | OXYGEN SATURATION: 94 % | DIASTOLIC BLOOD PRESSURE: 117 MMHG

## 2023-03-02 DIAGNOSIS — R00.0 TACHYCARDIA: Primary | ICD-10-CM

## 2023-03-02 DIAGNOSIS — I10 PRIMARY HYPERTENSION: ICD-10-CM

## 2023-03-02 LAB
ANION GAP SERPL CALC-SCNC: 10 MMOL/L (ref 5–15)
ATRIAL RATE: 125 BPM
ATRIAL RATE: 96 BPM
BASOPHILS # BLD: 0 K/UL (ref 0–0.1)
BASOPHILS NFR BLD: 1 % (ref 0–1)
BNP SERPL-MCNC: 1834 PG/ML (ref 0–125)
BUN SERPL-MCNC: 19 MG/DL (ref 6–20)
BUN/CREAT SERPL: 14 (ref 12–20)
CALCIUM SERPL-MCNC: 8.8 MG/DL (ref 8.5–10.1)
CALCULATED P AXIS, ECG09: 15 DEGREES
CALCULATED P AXIS, ECG09: 98 DEGREES
CALCULATED R AXIS, ECG10: -60 DEGREES
CALCULATED R AXIS, ECG10: 60 DEGREES
CALCULATED T AXIS, ECG11: -24 DEGREES
CALCULATED T AXIS, ECG11: 13 DEGREES
CHLORIDE SERPL-SCNC: 106 MMOL/L (ref 97–108)
CO2 SERPL-SCNC: 26 MMOL/L (ref 21–32)
COMMENT, HOLDF: NORMAL
CREAT SERPL-MCNC: 1.35 MG/DL (ref 0.7–1.3)
DIAGNOSIS, 93000: NORMAL
DIAGNOSIS, 93000: NORMAL
DIFFERENTIAL METHOD BLD: ABNORMAL
EOSINOPHIL # BLD: 0.1 K/UL (ref 0–0.4)
EOSINOPHIL NFR BLD: 2 % (ref 0–7)
ERYTHROCYTE [DISTWIDTH] IN BLOOD BY AUTOMATED COUNT: 14.3 % (ref 11.5–14.5)
GLUCOSE SERPL-MCNC: 109 MG/DL (ref 65–100)
HCT VFR BLD AUTO: 43 % (ref 36.6–50.3)
HGB BLD-MCNC: 15 G/DL (ref 12.1–17)
IMM GRANULOCYTES # BLD AUTO: 0.1 K/UL (ref 0–0.04)
IMM GRANULOCYTES NFR BLD AUTO: 1 % (ref 0–0.5)
LYMPHOCYTES # BLD: 1.8 K/UL (ref 0.8–3.5)
LYMPHOCYTES NFR BLD: 36 % (ref 12–49)
MAGNESIUM SERPL-MCNC: 1.9 MG/DL (ref 1.6–2.4)
MCH RBC QN AUTO: 29.5 PG (ref 26–34)
MCHC RBC AUTO-ENTMCNC: 34.9 G/DL (ref 30–36.5)
MCV RBC AUTO: 84.6 FL (ref 80–99)
MONOCYTES # BLD: 0.4 K/UL (ref 0–1)
MONOCYTES NFR BLD: 9 % (ref 5–13)
NEUTS SEG # BLD: 2.7 K/UL (ref 1.8–8)
NEUTS SEG NFR BLD: 51 % (ref 32–75)
NRBC # BLD: 0 K/UL (ref 0–0.01)
NRBC BLD-RTO: 0 PER 100 WBC
P-R INTERVAL, ECG05: 122 MS
P-R INTERVAL, ECG05: 176 MS
PLATELET # BLD AUTO: 144 K/UL (ref 150–400)
PMV BLD AUTO: 11.7 FL (ref 8.9–12.9)
POTASSIUM SERPL-SCNC: 3.7 MMOL/L (ref 3.5–5.1)
Q-T INTERVAL, ECG07: 380 MS
Q-T INTERVAL, ECG07: 390 MS
QRS DURATION, ECG06: 104 MS
QRS DURATION, ECG06: 96 MS
QTC CALCULATION (BEZET), ECG08: 492 MS
QTC CALCULATION (BEZET), ECG08: 548 MS
RBC # BLD AUTO: 5.08 M/UL (ref 4.1–5.7)
SAMPLES BEING HELD,HOLD: NORMAL
SODIUM SERPL-SCNC: 142 MMOL/L (ref 136–145)
TROPONIN I SERPL HS-MCNC: 66 NG/L (ref 0–76)
VENTRICULAR RATE, ECG03: 125 BPM
VENTRICULAR RATE, ECG03: 96 BPM
WBC # BLD AUTO: 5.1 K/UL (ref 4.1–11.1)

## 2023-03-02 PROCEDURE — 96376 TX/PRO/DX INJ SAME DRUG ADON: CPT

## 2023-03-02 PROCEDURE — 71045 X-RAY EXAM CHEST 1 VIEW: CPT

## 2023-03-02 PROCEDURE — 99285 EMERGENCY DEPT VISIT HI MDM: CPT

## 2023-03-02 PROCEDURE — 85025 COMPLETE CBC W/AUTO DIFF WBC: CPT

## 2023-03-02 PROCEDURE — 93005 ELECTROCARDIOGRAM TRACING: CPT

## 2023-03-02 PROCEDURE — 80048 BASIC METABOLIC PNL TOTAL CA: CPT

## 2023-03-02 PROCEDURE — 96374 THER/PROPH/DIAG INJ IV PUSH: CPT

## 2023-03-02 PROCEDURE — 83735 ASSAY OF MAGNESIUM: CPT

## 2023-03-02 PROCEDURE — 84484 ASSAY OF TROPONIN QUANT: CPT

## 2023-03-02 PROCEDURE — 74011000250 HC RX REV CODE- 250: Performed by: EMERGENCY MEDICINE

## 2023-03-02 PROCEDURE — 83880 ASSAY OF NATRIURETIC PEPTIDE: CPT

## 2023-03-02 PROCEDURE — 74011250637 HC RX REV CODE- 250/637: Performed by: EMERGENCY MEDICINE

## 2023-03-02 RX ORDER — METOPROLOL TARTRATE 5 MG/5ML
5 INJECTION INTRAVENOUS
Status: COMPLETED | OUTPATIENT
Start: 2023-03-02 | End: 2023-03-02

## 2023-03-02 RX ORDER — HYDROCHLOROTHIAZIDE 25 MG/1
12.5 TABLET ORAL DAILY
Qty: 5 TABLET | Refills: 0 | Status: SHIPPED | OUTPATIENT
Start: 2023-03-02 | End: 2023-03-12

## 2023-03-02 RX ORDER — METOPROLOL TARTRATE 25 MG/1
12.5 TABLET, FILM COATED ORAL
Status: COMPLETED | OUTPATIENT
Start: 2023-03-02 | End: 2023-03-02

## 2023-03-02 RX ADMIN — METOPROLOL TARTRATE 12.5 MG: 25 TABLET, FILM COATED ORAL at 06:46

## 2023-03-02 RX ADMIN — METOPROLOL TARTRATE 5 MG: 1 INJECTION, SOLUTION INTRAVENOUS at 07:34

## 2023-03-02 RX ADMIN — METOPROLOL TARTRATE 5 MG: 1 INJECTION, SOLUTION INTRAVENOUS at 06:15

## 2023-03-02 NOTE — ED TRIAGE NOTES
Pt presents to ED with c/o SOB, and elevated HR. Pt reports walking short distances and feeling \"winded\"  Pt reports recent diagnosis of A Fib. PT states he has been taking prescribed meds. Last doses taken last night.

## 2023-03-02 NOTE — ED NOTES
Emergency Department Nursing Plan of Care       The Nursing Plan of Care is developed from the Nursing assessment and Emergency Department Attending provider initial evaluation. The plan of care may be reviewed in the ED Provider note.     The Plan of Care was developed with the following considerations:   Patient / Family readiness to learn indicated by:verbalized understanding  Persons(s) to be included in education: patient  Barriers to Learning/Limitations:No    Signed     Marisa óLpez RN    3/2/2023   5:45 AM

## 2023-03-02 NOTE — ED PROVIDER NOTES
[facility] EMERGENCY DEPT  EMERGENCY DEPARTMENT ENCOUNTER       Pt Name: Imtiaz Mortensen  MRN: [id number]  [place] 1970  Date of evaluation: 3/2/2023  Provider: Rhiannon Dean MD   PCP: Valerie Sicard, NP  Note Started: 6:05 AM 3/2/23     CHIEF COMPLAINT       Chief Complaint   Patient presents with    Shortness of Breath     Recent diagnosis A Fib         HISTORY OF PRESENT ILLNESS: 1 or more elements      History From: patient, History limited by:  none     Imtiaz Mortensen is a 46 y.o. male who presents dilatory to the ED complaining of dyspnea and tachycardia which began while he was getting ready for work this morning. States he got winded walking to his truck. Reports associated tachycardia. Patient was admitted to [facility] February 6 with new onset a flutter and had a ACE cardioversion during that admission. He returned in mid February to this ED in a flutter and required cardioversion and ACE. He was discharged on metoprolol and Eliquis and has a follow-up appointment with cardiology, Dr. Dann Rajan, on Monday. Patient has been compliant with his metoprolol. Takes daily at 6:30 PM and 6:30 AM.  Took his last dose at 6:30 PM yesterday. He has a watch that tracks his heart rate and he states his heart rate has been up and down all night, getting as high as 130 (which is less high than prior episodes of a flutter, which by report were as high as 160). Reports dyspnea with exertion. Denies leg swelling or outright chest pain but does describe chest \"tightness\" which began yesterday. Patient also reports a cough which is mildly productive. Denies fever. States he felt a brief sensation of heart fluttering overnight which felt like his a flutter but is not feeling it currently. Nursing Notes were all reviewed and agreed with or any disagreements were addressed in the HPI. REVIEW OF SYSTEMS        Positives and Pertinent negatives as per HPI.     PAST HISTORY     Past Medical History:  Past Medical History:   Diagnosis Date    Hypertension        Past Surgical History:  History reviewed. No pertinent surgical history. Family History:  History reviewed. No pertinent family history. Social History:  Social History     Tobacco Use    Smoking status: Never    Smokeless tobacco: Never   Substance Use Topics    Alcohol use: Yes     Alcohol/week: 6.0 standard drinks     Types: 6 Shots of liquor per week    Drug use: Never       Allergies: Allergies   Allergen Reactions    Shellfish Derived Anaphylaxis       CURRENT MEDICATIONS      Discharge Medication List as of 3/2/2023  7:23 AM        CONTINUE these medications which have NOT CHANGED    Details   !! metoprolol tartrate (LOPRESSOR) 25 mg tablet Take 1 Tablet by mouth two (2) times a day for 30 days. , Normal, Disp-60 Tablet, R-0      apixaban (Eliquis) 5 mg tablet Take 1 Tablet by mouth two (2) times a day for 30 days. , Normal, Disp-60 Tablet, R-0      lisinopriL (PRINIVIL, ZESTRIL) 5 mg tablet Take 1 Tablet by mouth daily. , Normal, Disp-30 Tablet, R-1      !! metoprolol tartrate (LOPRESSOR) 25 mg tablet Take 0.5 Tablets by mouth two (2) times a day., Normal, Disp-30 Tablet, R-0      triamcinolone acetonide (KENALOG) 0.1 % topical cream Apply  to affected area two (2) times a day. use thin layer, Normal, Disp-15 g, R-0      albuterol (PROVENTIL HFA, VENTOLIN HFA, PROAIR HFA) 90 mcg/actuation inhaler Take 2 Puffs by inhalation every four (4) hours as needed., Historical Med       !! - Potential duplicate medications found. Please discuss with provider. SCREENINGS               No data recorded         PHYSICAL EXAM      ED Triage Vitals [03/02/23 0536]   ED Encounter Vitals Group      BP (!) 160/123      Pulse (Heart Rate) 94      Resp Rate 20      Temp 97.7 °F (36.5 °C)      Temp src       O2 Sat (%) 98 %      Weight 240 lb      Height 6' 1\"        Physical Exam  Constitutional:       General: He is not in acute distress.      Appearance: He is not toxic-appearing or diaphoretic. HENT:      Head: Normocephalic and atraumatic. Cardiovascular:      Rate and Rhythm: Tachycardia present. Pulmonary:      Effort: Pulmonary effort is normal.      Breath sounds: No wheezing or rales. Musculoskeletal:      Right lower leg: No edema. Left lower leg: No edema. Skin:     General: Skin is warm and dry. Neurological:      General: No focal deficit present. Psychiatric:         Mood and Affect: Mood normal.        DIAGNOSTIC RESULTS   LABS:     No results found for this or any previous visit (from the past 12 hour(s)). EKG: If performed, independent interpretation documented below in the MDM section     RADIOLOGY:  Non-plain film images such as CT, Ultrasound and MRI are read by the radiologist. Plain radiographic images are visualized and preliminarily interpreted by the ED Provider with the findings documented in the MDM section. Interpretation per the Radiologist below, if available at the time of this note:     No results found. PROCEDURES   Unless otherwise noted below, none  Procedures       EMERGENCY DEPARTMENT COURSE and DIFFERENTIAL DIAGNOSIS/MDM   Vitals:    Vitals:    03/02/23 0652 03/02/23 0659 03/02/23 0734 03/02/23 0805   BP:   (!) 138/117 (!) 143/117   Pulse: 86 88 84 82   Resp: 16 24  20   Temp:       SpO2: 98% 96%  94%   Weight:       Height:            Patient was given the following medications:  Medications   metoprolol (LOPRESSOR) injection 5 mg (5 mg IntraVENous Given 3/2/23 0615)   metoprolol tartrate (LOPRESSOR) tablet 12.5 mg (12.5 mg Oral Given 3/2/23 0646)   metoprolol (LOPRESSOR) injection 5 mg (5 mg IntraVENous Given 3/2/23 0734)       Medical Decision Making  Dyspnea and chest tightness in a patient with recent new dx aflutter, s/p 2 recent cardioversion. No clinical signs of volume overload on exam.  Patient had recent negative cath so doubt this is ischemia. Suspect symptoms are related to tachycardia. The last 2 times patient received diltiazem he had subsequent hypotension. He is currently on metoprolol. States he takes 12.5 mg twice a day    We will try rate control in the ED with IV metoprolol and, in the absence of other acute finding, consider increasing dose from 12.5 mg to 25 mg twice daily until he follows up with cardiology Monday. CRITICAL CARE NOTE :    7:25 AM      IMPENDING DETERIORATION -Cardiovascular and Metabolic    ASSOCIATED RISK FACTORS - Hypotension, Dysrhythmia, Metabolic changes, and CNS Decompensation    MANAGEMENT- Bedside Assessment and Supervision of Care    INTERPRETATION -  ECG, Blood Pressure, and Cardiac Output Measures     INTERVENTIONS - hemodynamic mngmt and Metobolic interventions    CASE REVIEW - Medical Sub-Specialist and Nursing    TREATMENT RESPONSE -Improved    PERFORMED BY - Self        NOTES   :      I have spent 55 minutes of critical care time involved in lab review, consultations with specialist(s), conversations with nursing, family decision- making, bedside attention and documentation. During this entire length of time I was immediately available to the patient. Length of critical care time documented does NOT include separate billable procedure(s). Wu Benitez MD        Amount and/or Complexity of Data Reviewed  Labs: ordered. Radiology: ordered. ECG/medicine tests: ordered. Risk  Prescription drug management. **PLEASE SEE ED COURSE DETAILS BELOW FOR FURTHER MDM DETAILS:  ED Course as of 03/03/23 0618   Thu Mar 02, 2023   0639 EKG done at 539: Sinus rhythm with PACs, rate 96, normal axis, QTc 492, nonspecific ST segment change, poor baseline. No definite ischemia. Interpreted by me. Sinus tach, rate 125, left axis deviation, nonspecific ST segment changes with ST morphology similar to previous EKG done 226. Pulse in the 80's now after 5mg metoprolol and feeling less dyspneic.  [SS]   0646 BNP significantly elevated.  Unclear if this is due only to dysrhythmia or if requires more work-up. Will consult cardiology. [SS]   [phone] with cardiology, Dr. Luis Angel Freeman, regarding elevated bnp. Trop at baseline. Creatinine at baseline. Patient with clear x-ray. Asymptomatic with walking. Discussed possibility of admission for further work-up as to etiology of bnp elevation. Feels bnp is explained by tachycardia. Agrees with increasing metoprolol to 25mg bid. Bedside to discuss plan with patient and he was having some intermittent episodes of tachycardia. These episodes were asymptomatic. Will dose additional metoprolol before discharge. Counseled to return if worse. Patient is requesting also to be restarted on hydrochlorothiazide. I feel this is reasonable given hypertension. We will start very small dose in addition to metoprolol, given that he did have a reduced ef on his echo last month [SS]      ED Course User Index  [SS] Beba Guerra MD         FINAL IMPRESSION     1. Tachycardia    2. Primary hypertension          DISPOSITION/PLAN   Barbara Alo  results have been reviewed with him. He has been counseled regarding his diagnosis, treatment, and plan. He verbally conveys understanding and agreement of the signs, symptoms, diagnosis, treatment and prognosis and additionally agrees to follow up as discussed. He also agrees with the care-plan and conveys that all of his questions have been answered. I have also provided discharge instructions for him that include: educational information regarding their diagnosis and treatment, and list of reasons why they would want to return to the ED prior to their follow-up appointment, should his condition change. PATIENT REFERRED TO:  Follow-up Information       Follow up With Specialties Details Why Contact Info    Dr. Cayden Ponce on Monday as scheduled.                   DISCHARGE MEDICATIONS:  Discharge Medication List as of 3/2/2023  7:23 AM        START taking these medications    Details hydroCHLOROthiazide (HYDRODIURIL) 25 mg tablet Take 0.5 Tablets by mouth daily for 10 days. , Normal, Disp-5 Tablet, R-0           CONTINUE these medications which have NOT CHANGED    Details   !! metoprolol tartrate (LOPRESSOR) 25 mg tablet Take 1 Tablet by mouth two (2) times a day for 30 days. , Normal, Disp-60 Tablet, R-0      apixaban (Eliquis) 5 mg tablet Take 1 Tablet by mouth two (2) times a day for 30 days. , Normal, Disp-60 Tablet, R-0      lisinopriL (PRINIVIL, ZESTRIL) 5 mg tablet Take 1 Tablet by mouth daily. , Normal, Disp-30 Tablet, R-1      !! metoprolol tartrate (LOPRESSOR) 25 mg tablet Take 0.5 Tablets by mouth two (2) times a day., Normal, Disp-30 Tablet, R-0      triamcinolone acetonide (KENALOG) 0.1 % topical cream Apply  to affected area two (2) times a day. use thin layer, Normal, Disp-15 g, R-0      albuterol (PROVENTIL HFA, VENTOLIN HFA, PROAIR HFA) 90 mcg/actuation inhaler Take 2 Puffs by inhalation every four (4) hours as needed., Historical Med       !! - Potential duplicate medications found. Please discuss with provider. DISCONTINUED MEDICATIONS:  Discharge Medication List as of 3/2/2023  7:23 AM          I am the Primary Clinician of Record. Miriam Stone MD (electronically signed)    (Please note that parts of this dictation were completed with voice recognition software. Quite often unanticipated grammatical, syntax, homophones, and other interpretive errors are inadvertently transcribed by the computer software. Please disregards these errors.  Please excuse any errors that have escaped final proofreading.)

## 2023-03-02 NOTE — ED NOTES
Bedside and Verbal shift change report given to Mason Patten (oncoming nurse) by Howie Morales RN (offgoing nurse). Report included the following information SBAR, Kardex, ED Summary, [facility] and Recent Results.

## 2023-03-02 NOTE — ED NOTES
Patient ambulated to bathroom. HR increased to 120s briefly, but stabilized in 80s once resting back on stretcher. Denies symptoms during increase, but notes his watch alarmed him. MD updated.

## 2023-03-06 ENCOUNTER — VIRTUAL VISIT (OUTPATIENT)
Dept: INTERNAL MEDICINE CLINIC | Age: 53
End: 2023-03-06
Payer: COMMERCIAL

## 2023-03-06 DIAGNOSIS — I10 HYPERTENSION, UNSPECIFIED TYPE: Primary | ICD-10-CM

## 2023-03-06 DIAGNOSIS — I48.91 ATRIAL FIBRILLATION, UNSPECIFIED TYPE (HCC): ICD-10-CM

## 2023-03-06 PROCEDURE — 99214 OFFICE O/P EST MOD 30 MIN: CPT | Performed by: INTERNAL MEDICINE

## 2023-03-06 RX ORDER — HYDROCHLOROTHIAZIDE 25 MG/1
25 TABLET ORAL DAILY
Qty: 30 TABLET | Refills: 1 | Status: SHIPPED | OUTPATIENT
Start: 2023-03-06

## 2023-03-06 NOTE — PROGRESS NOTES
Annmarie Contreras is a 46 y.o. male, evaluated via audio-only technology on 3/6/2023 for Hypertension, Hospital Follow Up, and Medication Evaluation  . Assessment & Plan:   Diagnoses and all orders for this visit:    1. Hypertension, unspecified type  -     hydroCHLOROthiazide (HYDRODIURIL) 25 mg tablet; Take 1 Tablet by mouth daily.  -     METABOLIC PANEL, COMPREHENSIVE; Future  -     CBC WITH AUTOMATED DIFF; Future        -     continue with ACE 5 mg daily. DASh diet. Reviewed how to take BP  2. Atrial fibrillation, unspecified type (HCC)  -     METABOLIC PANEL, COMPREHENSIVE; Future  -     CBC WITH AUTOMATED DIFF; Future        Follow-up and Dispositions    Return in about 2 weeks (around 3/20/2023), or if symptoms worsen or fail to improve. 12  Subjective:     Patient was spoken to via phone. Reports that he has had 2 recent ER visit. Patient in early February had a new onset of a flutter. Is was given a ACE and cardioverted. He then was started on a BB and anticoagulant. He was being followed by cardiology. Patient notes that last week he was having some congestion. Took some cold medicine and he felt like made her feel terrible. He then came to the ER to be assessed. He then came back in the 3 days for SOB and chest tightness. Chest xray was negative, but his troponin was slightly increased. Cardiology was consulted. Troponin began to improve. His BB was increased. Patient notes that her BP continues to be high. The lisinopril  was started. Notes a BP today in the 160's. Is also only on 12.5 mg of HCTZ. Notes no CP, or SOB. No palpitations. Has a follow up coming up with cardiology. Prior to Admission medications    Medication Sig Start Date End Date Taking? Authorizing Provider   hydroCHLOROthiazide (HYDRODIURIL) 25 mg tablet Take 1 Tablet by mouth daily. 3/6/23  Yes Tyson Hays NP   metoprolol tartrate (LOPRESSOR) 25 mg tablet Take 1 Tablet by mouth two (2) times a day for 30 days. 2/26/23 3/28/23 Yes Venice Thornton DO   apixaban (Eliquis) 5 mg tablet Take 1 Tablet by mouth two (2) times a day for 30 days. 2/26/23 3/28/23 Yes Alvina Nix MD   lisinopriL (PRINIVIL, ZESTRIL) 5 mg tablet Take 1 Tablet by mouth daily. 2/20/23  Yes Avery Hays NP   triamcinolone acetonide (KENALOG) 0.1 % topical cream Apply  to affected area two (2) times a day. use thin layer 2/20/23  Yes Avery Hays NP   albuterol (PROVENTIL HFA, VENTOLIN HFA, PROAIR HFA) 90 mcg/actuation inhaler Take 2 Puffs by inhalation every four (4) hours as needed. 12/6/22 12/6/23 Yes Provider, Historical   hydroCHLOROthiazide (HYDRODIURIL) 25 mg tablet Take 0.5 Tablets by mouth daily for 10 days. 3/2/23 3/6/23  Xander Cantrell MD   metoprolol tartrate (LOPRESSOR) 25 mg tablet Take 0.5 Tablets by mouth two (2) times a day. Patient not taking: Reported on 3/6/2023 2/26/23 3/6/23  Alvina Nix MD     Patient Active Problem List   Diagnosis Code    Atrial flutter [facility]) W20.69    Diastolic heart failure (HCC) I50.30    A-fib (HCC) I48.91    LAMINE (obstructive sleep apnea) G47.33    HTN (hypertension) I10     Patient Active Problem List    Diagnosis Date Noted    A-fib ([address] 02/20/2023    LAMINE (obstructive sleep apnea) 02/20/2023    HTN (hypertension) 79/95/7564    Diastolic heart failure ([address] 02/07/2023    Atrial flutter ([address] 02/06/2023     Current Outpatient Medications   Medication Sig Dispense Refill    hydroCHLOROthiazide (HYDRODIURIL) 25 mg tablet Take 1 Tablet by mouth daily. 30 Tablet 1    metoprolol tartrate (LOPRESSOR) 25 mg tablet Take 1 Tablet by mouth two (2) times a day for 30 days. 60 Tablet 0    apixaban (Eliquis) 5 mg tablet Take 1 Tablet by mouth two (2) times a day for 30 days. 60 Tablet 0    lisinopriL (PRINIVIL, ZESTRIL) 5 mg tablet Take 1 Tablet by mouth daily. 30 Tablet 1    triamcinolone acetonide (KENALOG) 0.1 % topical cream Apply  to affected area two (2) times a day.  use thin layer 15 g 0    albuterol (PROVENTIL HFA, VENTOLIN HFA, PROAIR HFA) 90 mcg/actuation inhaler Take 2 Puffs by inhalation every four (4) hours as needed. Allergies   Allergen Reactions    Shellfish Derived Anaphylaxis     Past Medical History:   Diagnosis Date    Hypertension      No past surgical history on file. No family history on file. Social History     Tobacco Use    Smoking status: Never    Smokeless tobacco: Never   Substance Use Topics    Alcohol use: Yes     Alcohol/week: 6.0 standard drinks     Types: 6 Shots of liquor per week       Review of Systems   Constitutional:  Negative for chills and fever. Respiratory:  Negative for cough and shortness of breath. Cardiovascular:  Negative for chest pain and palpitations. Gastrointestinal: Negative. Neurological: Negative. Psychiatric/Behavioral: Negative. No data recorded     Valarie Escobedo was evaluated through a patient-initiated, synchronous (real-time) audio only encounter. He (or guardian if applicable) is aware that it is a billable service, which includes applicable co-pays, with coverage as determined by his insurance carrier. This visit was conducted with the patient's (and/or Deepak Ford guardian's) verbal consent. He has not had a related appointment within my department in the past 7 days or scheduled within the next 24 hours. The patient was located in a state where the provider was licensed to provide care.   The patient was located at: Home: [place] [address]  The provider was located at: Home: [address] [place] [address]    Total Time: minutes: 22 min     Tara Abraham NP

## 2023-03-06 NOTE — PROGRESS NOTES
Health Maintenance Due   Topic Date Due    Hepatitis C Screening  Never done    COVID-19 Vaccine (1) Never done    Pneumococcal 0-64 years (1 - PCV) Never done    DTaP/Tdap/Td series (1 - Tdap) Never done    Colorectal Cancer Screening Combo  Never done    Shingles Vaccine (1 of 2) Never done    Flu Vaccine (1) Never done       No chief complaint on file. 1. Have you been to the ER, urgent care clinic since your last visit? Hospitalized since your last visit? YES MARCH 2ND, [facility],     2. Have you seen or consulted any other health care providers outside of the [address] [place] [address] since your last visit? Include any pap smears or colon screening. No    3) Do you have an Advance Directive on file? no    4) Are you interested in receiving information on Advance Directives? NO      Patient is accompanied by self I have received verbal consent from Nathaniel Chawla to discuss any/all medical information while they are present in the room.

## 2023-06-28 ENCOUNTER — NURSE TRIAGE (OUTPATIENT)
Dept: OTHER | Facility: CLINIC | Age: 53
End: 2023-06-28

## 2023-06-29 ENCOUNTER — OFFICE VISIT (OUTPATIENT)
Age: 53
End: 2023-06-29
Payer: COMMERCIAL

## 2023-06-29 VITALS
SYSTOLIC BLOOD PRESSURE: 128 MMHG | HEART RATE: 74 BPM | BODY MASS INDEX: 31.28 KG/M2 | RESPIRATION RATE: 16 BRPM | WEIGHT: 236 LBS | HEIGHT: 73 IN | DIASTOLIC BLOOD PRESSURE: 88 MMHG | OXYGEN SATURATION: 98 %

## 2023-06-29 DIAGNOSIS — R82.4 URINE KETONES: ICD-10-CM

## 2023-06-29 DIAGNOSIS — R39.89 ABNORMAL URINE COLOR: Primary | ICD-10-CM

## 2023-06-29 DIAGNOSIS — E55.9 VITAMIN D DEFICIENCY: ICD-10-CM

## 2023-06-29 DIAGNOSIS — Z12.5 SCREENING PSA (PROSTATE SPECIFIC ANTIGEN): ICD-10-CM

## 2023-06-29 LAB
BASOPHILS # BLD AUTO: 0 X10E3/UL (ref 0–0.2)
BASOPHILS NFR BLD AUTO: 0 %
BILIRUBIN, URINE, POC: ABNORMAL
BLOOD URINE, POC: ABNORMAL
EOSINOPHIL # BLD AUTO: 0.1 X10E3/UL (ref 0–0.4)
EOSINOPHIL NFR BLD AUTO: 1 %
ERYTHROCYTE [DISTWIDTH] IN BLOOD BY AUTOMATED COUNT: 15.8 % (ref 11.6–15.4)
GLUCOSE URINE, POC: NEGATIVE
HCT VFR BLD AUTO: 44.8 % (ref 37.5–51)
HGB BLD-MCNC: 15.8 G/DL (ref 13–17.7)
IMM GRANULOCYTES # BLD AUTO: 0 X10E3/UL (ref 0–0.1)
IMM GRANULOCYTES NFR BLD AUTO: 1 %
KETONES, URINE, POC: ABNORMAL
LEUKOCYTE ESTERASE, URINE, POC: NEGATIVE
LYMPHOCYTES # BLD AUTO: 1.2 X10E3/UL (ref 0.7–3.1)
LYMPHOCYTES NFR BLD AUTO: 19 %
MCH RBC QN AUTO: 29.2 PG (ref 26.6–33)
MCHC RBC AUTO-ENTMCNC: 35.3 G/DL (ref 31.5–35.7)
MCV RBC AUTO: 83 FL (ref 79–97)
MONOCYTES # BLD AUTO: 0.9 X10E3/UL (ref 0.1–0.9)
MONOCYTES NFR BLD AUTO: 14 %
NEUTROPHILS # BLD AUTO: 4.1 X10E3/UL (ref 1.4–7)
NEUTROPHILS NFR BLD AUTO: 65 %
NITRITE, URINE, POC: NEGATIVE
PH, URINE, POC: 5.5 (ref 4.6–8)
PLATELET # BLD AUTO: 165 X10E3/UL (ref 150–450)
PROTEIN,URINE, POC: ABNORMAL
RBC # BLD AUTO: 5.42 X10E6/UL (ref 4.14–5.8)
SPECIFIC GRAVITY, URINE, POC: 1.03 (ref 1–1.03)
URINALYSIS CLARITY, POC: CLEAR
URINALYSIS COLOR, POC: ABNORMAL
UROBILINOGEN, POC: NORMAL
WBC # BLD AUTO: 6.3 X10E3/UL (ref 3.4–10.8)

## 2023-06-29 PROCEDURE — 99213 OFFICE O/P EST LOW 20 MIN: CPT | Performed by: INTERNAL MEDICINE

## 2023-06-29 PROCEDURE — 3074F SYST BP LT 130 MM HG: CPT | Performed by: INTERNAL MEDICINE

## 2023-06-29 PROCEDURE — 1036F TOBACCO NON-USER: CPT | Performed by: INTERNAL MEDICINE

## 2023-06-29 PROCEDURE — 81003 URINALYSIS AUTO W/O SCOPE: CPT | Performed by: INTERNAL MEDICINE

## 2023-06-29 PROCEDURE — G8417 CALC BMI ABV UP PARAM F/U: HCPCS | Performed by: INTERNAL MEDICINE

## 2023-06-29 PROCEDURE — G8427 DOCREV CUR MEDS BY ELIG CLIN: HCPCS | Performed by: INTERNAL MEDICINE

## 2023-06-29 PROCEDURE — 3017F COLORECTAL CA SCREEN DOC REV: CPT | Performed by: INTERNAL MEDICINE

## 2023-06-29 PROCEDURE — 3079F DIAST BP 80-89 MM HG: CPT | Performed by: INTERNAL MEDICINE

## 2023-06-29 RX ORDER — APIXABAN 5 MG/1
5 TABLET, FILM COATED ORAL DAILY
COMMUNITY
Start: 2023-04-13

## 2023-06-29 RX ORDER — LISINOPRIL 40 MG/1
40 TABLET ORAL DAILY
COMMUNITY
Start: 2018-04-07

## 2023-06-29 RX ORDER — LISINOPRIL 5 MG/1
1 TABLET ORAL DAILY
COMMUNITY
Start: 2023-05-01 | End: 2023-06-29

## 2023-06-29 RX ORDER — HYDROCHLOROTHIAZIDE 25 MG/1
1 TABLET ORAL DAILY
COMMUNITY
Start: 2023-05-01

## 2023-06-29 SDOH — ECONOMIC STABILITY: HOUSING INSECURITY
IN THE LAST 12 MONTHS, WAS THERE A TIME WHEN YOU DID NOT HAVE A STEADY PLACE TO SLEEP OR SLEPT IN A SHELTER (INCLUDING NOW)?: NO

## 2023-06-29 SDOH — ECONOMIC STABILITY: FOOD INSECURITY: WITHIN THE PAST 12 MONTHS, YOU WORRIED THAT YOUR FOOD WOULD RUN OUT BEFORE YOU GOT MONEY TO BUY MORE.: NEVER TRUE

## 2023-06-29 SDOH — ECONOMIC STABILITY: INCOME INSECURITY: HOW HARD IS IT FOR YOU TO PAY FOR THE VERY BASICS LIKE FOOD, HOUSING, MEDICAL CARE, AND HEATING?: NOT HARD AT ALL

## 2023-06-29 SDOH — ECONOMIC STABILITY: FOOD INSECURITY: WITHIN THE PAST 12 MONTHS, THE FOOD YOU BOUGHT JUST DIDN'T LAST AND YOU DIDN'T HAVE MONEY TO GET MORE.: NEVER TRUE

## 2023-06-29 ASSESSMENT — ANXIETY QUESTIONNAIRES
1. FEELING NERVOUS, ANXIOUS, OR ON EDGE: 0
IF YOU CHECKED OFF ANY PROBLEMS ON THIS QUESTIONNAIRE, HOW DIFFICULT HAVE THESE PROBLEMS MADE IT FOR YOU TO DO YOUR WORK, TAKE CARE OF THINGS AT HOME, OR GET ALONG WITH OTHER PEOPLE: NOT DIFFICULT AT ALL
4. TROUBLE RELAXING: 0
7. FEELING AFRAID AS IF SOMETHING AWFUL MIGHT HAPPEN: 0
5. BEING SO RESTLESS THAT IT IS HARD TO SIT STILL: 0
2. NOT BEING ABLE TO STOP OR CONTROL WORRYING: 0
3. WORRYING TOO MUCH ABOUT DIFFERENT THINGS: 0

## 2023-06-29 ASSESSMENT — ENCOUNTER SYMPTOMS
ABDOMINAL PAIN: 1
CHEST TIGHTNESS: 0
COUGH: 0
NAUSEA: 1

## 2023-06-29 ASSESSMENT — PATIENT HEALTH QUESTIONNAIRE - PHQ9
1. LITTLE INTEREST OR PLEASURE IN DOING THINGS: 0
SUM OF ALL RESPONSES TO PHQ9 QUESTIONS 1 & 2: 0
SUM OF ALL RESPONSES TO PHQ QUESTIONS 1-9: 0
2. FEELING DOWN, DEPRESSED OR HOPELESS: 0

## 2023-06-30 ENCOUNTER — TELEPHONE (OUTPATIENT)
Age: 53
End: 2023-06-30

## 2023-06-30 DIAGNOSIS — R82.4 URINE KETONES: ICD-10-CM

## 2023-06-30 DIAGNOSIS — R74.8 ELEVATED LIVER ENZYMES: ICD-10-CM

## 2023-06-30 DIAGNOSIS — R39.89 ABNORMAL URINE COLOR: Primary | ICD-10-CM

## 2023-06-30 LAB
25(OH)D3+25(OH)D2 SERPL-MCNC: 23 NG/ML (ref 30–100)
ALBUMIN SERPL-MCNC: 4.6 G/DL (ref 3.8–4.9)
ALBUMIN/GLOB SERPL: 1.6 {RATIO} (ref 1.2–2.2)
ALP SERPL-CCNC: 117 IU/L (ref 44–121)
ALT SERPL-CCNC: 114 IU/L (ref 0–44)
AST SERPL-CCNC: 59 IU/L (ref 0–40)
BILIRUB SERPL-MCNC: 3.1 MG/DL (ref 0–1.2)
BUN SERPL-MCNC: 20 MG/DL (ref 6–24)
BUN/CREAT SERPL: 14 (ref 9–20)
CALCIUM SERPL-MCNC: 10 MG/DL (ref 8.7–10.2)
CHLORIDE SERPL-SCNC: 95 MMOL/L (ref 96–106)
CO2 SERPL-SCNC: 23 MMOL/L (ref 20–29)
CREAT SERPL-MCNC: 1.48 MG/DL (ref 0.76–1.27)
EGFRCR SERPLBLD CKD-EPI 2021: 57 ML/MIN/1.73
GLOBULIN SER CALC-MCNC: 2.9 G/DL (ref 1.5–4.5)
GLUCOSE SERPL-MCNC: 110 MG/DL (ref 70–99)
HBA1C MFR BLD: 5.8 % (ref 4.8–5.6)
POTASSIUM SERPL-SCNC: 3.3 MMOL/L (ref 3.5–5.2)
PROT SERPL-MCNC: 7.5 G/DL (ref 6–8.5)
PSA SERPL-MCNC: 0.9 NG/ML (ref 0–4)
REPORT: NORMAL
SODIUM SERPL-SCNC: 136 MMOL/L (ref 134–144)

## 2023-07-01 LAB — BACTERIA UR CULT: NO GROWTH

## 2023-09-06 DIAGNOSIS — I10 ESSENTIAL (PRIMARY) HYPERTENSION: ICD-10-CM

## 2023-09-06 RX ORDER — HYDROCHLOROTHIAZIDE 25 MG/1
TABLET ORAL
Qty: 90 TABLET | Refills: 1 | Status: SHIPPED | OUTPATIENT
Start: 2023-09-06

## 2023-10-27 DIAGNOSIS — I10 ESSENTIAL (PRIMARY) HYPERTENSION: ICD-10-CM

## 2023-10-27 RX ORDER — LISINOPRIL 5 MG/1
5 TABLET ORAL DAILY
Qty: 90 TABLET | Refills: 1 | Status: SHIPPED | OUTPATIENT
Start: 2023-10-27

## 2023-11-26 ENCOUNTER — HOSPITAL ENCOUNTER (EMERGENCY)
Facility: HOSPITAL | Age: 53
Discharge: HOME | End: 2023-11-26
Payer: SELF-PAY

## 2023-11-26 VITALS
BODY MASS INDEX: 31.14 KG/M2 | SYSTOLIC BLOOD PRESSURE: 180 MMHG | DIASTOLIC BLOOD PRESSURE: 123 MMHG | RESPIRATION RATE: 18 BRPM | TEMPERATURE: 97.9 F | HEART RATE: 91 BPM | OXYGEN SATURATION: 97 % | HEIGHT: 73 IN | WEIGHT: 235 LBS

## 2023-11-26 DIAGNOSIS — I10 PRIMARY HYPERTENSION: ICD-10-CM

## 2023-11-26 DIAGNOSIS — R10.9 RIGHT FLANK PAIN: Primary | ICD-10-CM

## 2023-11-26 LAB
ALBUMIN SERPL-MCNC: 3.8 G/DL (ref 3.5–5)
ALBUMIN/GLOB SERPL: 0.8 (ref 1.1–2.2)
ALP SERPL-CCNC: 109 U/L (ref 45–117)
ALT SERPL-CCNC: 98 U/L (ref 12–78)
ANION GAP SERPL CALC-SCNC: 14 MMOL/L (ref 5–15)
APPEARANCE UR: CLEAR
AST SERPL-CCNC: 34 U/L (ref 15–37)
BACTERIA URNS QL MICRO: NEGATIVE /HPF
BASOPHILS # BLD: 0.1 K/UL (ref 0–0.1)
BASOPHILS NFR BLD: 1 % (ref 0–1)
BILIRUB SERPL-MCNC: 1.4 MG/DL (ref 0.2–1)
BILIRUB UR QL: NEGATIVE
BUN SERPL-MCNC: 12 MG/DL (ref 6–20)
BUN/CREAT SERPL: 11 (ref 12–20)
CALCIUM SERPL-MCNC: 9.2 MG/DL (ref 8.5–10.1)
CHLORIDE SERPL-SCNC: 95 MMOL/L (ref 97–108)
CO2 SERPL-SCNC: 25 MMOL/L (ref 21–32)
COLOR UR: ABNORMAL
CREAT SERPL-MCNC: 1.07 MG/DL (ref 0.7–1.3)
DIFFERENTIAL METHOD BLD: ABNORMAL
EOSINOPHIL # BLD: 0.1 K/UL (ref 0–0.4)
EOSINOPHIL NFR BLD: 1 % (ref 0–7)
EPITH CASTS URNS QL MICRO: ABNORMAL /LPF
ERYTHROCYTE [DISTWIDTH] IN BLOOD BY AUTOMATED COUNT: 13.2 % (ref 11.5–14.5)
GLOBULIN SER CALC-MCNC: 4.8 G/DL (ref 2–4)
GLUCOSE SERPL-MCNC: 120 MG/DL (ref 65–100)
GLUCOSE UR STRIP.AUTO-MCNC: 100 MG/DL
HCT VFR BLD AUTO: 43.5 % (ref 36.6–50.3)
HGB BLD-MCNC: 15.6 G/DL (ref 12.1–17)
HGB UR QL STRIP: NEGATIVE
IMM GRANULOCYTES # BLD AUTO: 0.1 K/UL (ref 0–0.04)
IMM GRANULOCYTES NFR BLD AUTO: 1 % (ref 0–0.5)
KETONES UR QL STRIP.AUTO: 15 MG/DL
LEUKOCYTE ESTERASE UR QL STRIP.AUTO: NEGATIVE
LIPASE SERPL-CCNC: 26 U/L (ref 13–75)
LYMPHOCYTES # BLD: 1 K/UL (ref 0.8–3.5)
LYMPHOCYTES NFR BLD: 9 % (ref 12–49)
MCH RBC QN AUTO: 29.2 PG (ref 26–34)
MCHC RBC AUTO-ENTMCNC: 35.9 G/DL (ref 30–36.5)
MCV RBC AUTO: 81.3 FL (ref 80–99)
MONOCYTES # BLD: 1 K/UL (ref 0–1)
MONOCYTES NFR BLD: 9 % (ref 5–13)
NEUTS SEG # BLD: 9.1 K/UL (ref 1.8–8)
NEUTS SEG NFR BLD: 79 % (ref 32–75)
NITRITE UR QL STRIP.AUTO: NEGATIVE
NRBC # BLD: 0 K/UL (ref 0–0.01)
NRBC BLD-RTO: 0 PER 100 WBC
PH UR STRIP: 6 (ref 5–8)
PLATELET # BLD AUTO: 218 K/UL (ref 150–400)
PMV BLD AUTO: 10.7 FL (ref 8.9–12.9)
POTASSIUM SERPL-SCNC: 2.9 MMOL/L (ref 3.5–5.1)
PROT SERPL-MCNC: 8.6 G/DL (ref 6.4–8.2)
PROT UR STRIP-MCNC: 100 MG/DL
RBC # BLD AUTO: 5.35 M/UL (ref 4.1–5.7)
RBC #/AREA URNS HPF: ABNORMAL /HPF (ref 0–5)
SODIUM SERPL-SCNC: 134 MMOL/L (ref 136–145)
SP GR UR REFRACTOMETRY: 1.02
URINE CULTURE IF INDICATED: ABNORMAL
UROBILINOGEN UR QL STRIP.AUTO: 1 EU/DL (ref 0.2–1)
WBC # BLD AUTO: 11.3 K/UL (ref 4.1–11.1)
WBC URNS QL MICRO: ABNORMAL /HPF (ref 0–4)

## 2023-11-26 PROCEDURE — 85025 COMPLETE CBC W/AUTO DIFF WBC: CPT

## 2023-11-26 PROCEDURE — 80053 COMPREHEN METABOLIC PANEL: CPT

## 2023-11-26 PROCEDURE — 96376 TX/PRO/DX INJ SAME DRUG ADON: CPT

## 2023-11-26 PROCEDURE — 6360000002 HC RX W HCPCS: Performed by: NURSE PRACTITIONER

## 2023-11-26 PROCEDURE — 96374 THER/PROPH/DIAG INJ IV PUSH: CPT

## 2023-11-26 PROCEDURE — 81001 URINALYSIS AUTO W/SCOPE: CPT

## 2023-11-26 PROCEDURE — 6370000000 HC RX 637 (ALT 250 FOR IP): Performed by: NURSE PRACTITIONER

## 2023-11-26 PROCEDURE — 83690 ASSAY OF LIPASE: CPT

## 2023-11-26 PROCEDURE — 96375 TX/PRO/DX INJ NEW DRUG ADDON: CPT

## 2023-11-26 PROCEDURE — 36415 COLL VENOUS BLD VENIPUNCTURE: CPT

## 2023-11-26 PROCEDURE — 99284 EMERGENCY DEPT VISIT MOD MDM: CPT

## 2023-11-26 RX ORDER — ONDANSETRON 2 MG/ML
4 INJECTION INTRAMUSCULAR; INTRAVENOUS
Status: COMPLETED | OUTPATIENT
Start: 2023-11-26 | End: 2023-11-26

## 2023-11-26 RX ORDER — KETOROLAC TROMETHAMINE 30 MG/ML
30 INJECTION, SOLUTION INTRAMUSCULAR; INTRAVENOUS
Status: DISCONTINUED | OUTPATIENT
Start: 2023-11-26 | End: 2023-11-26

## 2023-11-26 RX ORDER — MORPHINE SULFATE 2 MG/ML
2 INJECTION, SOLUTION INTRAMUSCULAR; INTRAVENOUS
Status: COMPLETED | OUTPATIENT
Start: 2023-11-26 | End: 2023-11-26

## 2023-11-26 RX ORDER — POTASSIUM CHLORIDE 750 MG/1
40 TABLET, FILM COATED, EXTENDED RELEASE ORAL ONCE
Status: COMPLETED | OUTPATIENT
Start: 2023-11-26 | End: 2023-11-26

## 2023-11-26 RX ORDER — POTASSIUM CHLORIDE 750 MG/1
10 TABLET, EXTENDED RELEASE ORAL 2 TIMES DAILY
Qty: 6 TABLET | Refills: 0 | Status: SHIPPED | OUTPATIENT
Start: 2023-11-26 | End: 2023-11-29

## 2023-11-26 RX ORDER — HYDROCODONE BITARTRATE AND ACETAMINOPHEN 5; 325 MG/1; MG/1
1 TABLET ORAL EVERY 8 HOURS PRN
Qty: 9 TABLET | Refills: 0 | Status: SHIPPED | OUTPATIENT
Start: 2023-11-26 | End: 2023-11-29

## 2023-11-26 RX ORDER — LIDOCAINE 4 G/G
1 PATCH TOPICAL DAILY
Qty: 15 PATCH | Refills: 0 | Status: SHIPPED | OUTPATIENT
Start: 2023-11-26 | End: 2023-12-11

## 2023-11-26 RX ADMIN — MORPHINE SULFATE 2 MG: 2 INJECTION, SOLUTION INTRAMUSCULAR; INTRAVENOUS at 12:29

## 2023-11-26 RX ADMIN — ONDANSETRON 4 MG: 2 INJECTION INTRAMUSCULAR; INTRAVENOUS at 12:50

## 2023-11-26 RX ADMIN — POTASSIUM CHLORIDE 40 MEQ: 750 TABLET, EXTENDED RELEASE ORAL at 12:51

## 2023-11-26 RX ADMIN — MORPHINE SULFATE 2 MG: 2 INJECTION, SOLUTION INTRAMUSCULAR; INTRAVENOUS at 13:38

## 2023-11-26 ASSESSMENT — PAIN SCALES - GENERAL
PAINLEVEL_OUTOF10: 8
PAINLEVEL_OUTOF10: 10
PAINLEVEL_OUTOF10: 9

## 2023-11-26 ASSESSMENT — PAIN DESCRIPTION - DESCRIPTORS
DESCRIPTORS: ACHING;SORE
DESCRIPTORS: ACHING
DESCRIPTORS: ACHING;SORE

## 2023-11-26 ASSESSMENT — PAIN DESCRIPTION - LOCATION
LOCATION: FLANK
LOCATION: ABDOMEN
LOCATION: FLANK

## 2023-11-26 ASSESSMENT — PAIN DESCRIPTION - ORIENTATION
ORIENTATION: RIGHT
ORIENTATION: RIGHT
ORIENTATION: OTHER (COMMENT)

## 2023-11-26 ASSESSMENT — ENCOUNTER SYMPTOMS
BACK PAIN: 0
SHORTNESS OF BREATH: 0
ABDOMINAL PAIN: 0

## 2023-11-26 ASSESSMENT — PAIN - FUNCTIONAL ASSESSMENT: PAIN_FUNCTIONAL_ASSESSMENT: 0-10

## 2023-11-26 NOTE — ED PROVIDER NOTES
tablet  lidocaine 4 % external patch  potassium chloride 10 MEQ extended release tablet           DISCONTINUED MEDICATIONS:  Current Discharge Medication List          I have discussed the history and physical, results, MDM, and treatment plan with my supervising physician, No att. providers found, who agrees with my plan. I am the Primary Clinician of Record. YAMILKA Callejas NP (electronically signed)    (Please note that parts of this dictation were completed with voice recognition software. Quite often unanticipated grammatical, syntax, homophones, and other interpretive errors are inadvertently transcribed by the computer software. Please disregards these errors.  Please excuse any errors that have escaped final proofreading.)         YAMILKA Rizo NP  11/26/23 9358

## 2023-11-26 NOTE — ED TRIAGE NOTES
Patient presents to ED with c/o right side flank pain.  Patient states that he was seen at a hospital in [facility] and told he had pancreatitis

## 2023-11-26 NOTE — ED NOTES
Patient (s) 1 given copy of dc instructions and 3 script(s). Patient (s)  verbalized understanding of instructions and script (s). Patient given a current medication reconciliation form and verbalized understanding of their medications. Patient (s) verbalized understanding of the importance of discussing medications with his or her physician or clinic they will be following up with. Patient alert and oriented and in no acute distress.         Travis Pierre RN  11/26/23 5584

## 2023-11-26 NOTE — ED NOTES
Pt requesting more pain medications. Provider notified. Pain level is 9 on pain scale of 0 - 10.       Laura Hodge RN  11/26/23 9176

## 2023-12-27 ENCOUNTER — TELEPHONE (OUTPATIENT)
Age: 53
End: 2023-12-27

## 2023-12-27 NOTE — TELEPHONE ENCOUNTER
Last appt 6/29/2023      Hospital notes do not show sciatica in either rencounter, both visits were for acute abdominal pain with possible ETOH-related reoccurring pancreatitis. Please advise, appointment needed?

## 2023-12-27 NOTE — TELEPHONE ENCOUNTER
Tri Sanabria, APRN - NP  You11 minutes ago (8:45 AM)       Yes it would need an appointment or the notes from the visits

## 2023-12-27 NOTE — TELEPHONE ENCOUNTER
Patient called stating that he was recently in a hospital in [place] and again at [facility]. He said he was diagnosed with sciatic nerve. He says that his pain level is a 7. He wants to know who Martha would refer him to for this problem.  Please call him back at [phone]

## 2023-12-28 ENCOUNTER — OFFICE VISIT (OUTPATIENT)
Age: 53
End: 2023-12-28
Payer: COMMERCIAL

## 2023-12-28 VITALS
HEIGHT: 73 IN | RESPIRATION RATE: 17 BRPM | DIASTOLIC BLOOD PRESSURE: 88 MMHG | SYSTOLIC BLOOD PRESSURE: 138 MMHG | HEART RATE: 76 BPM | OXYGEN SATURATION: 99 % | TEMPERATURE: 98 F | WEIGHT: 236 LBS | BODY MASS INDEX: 31.28 KG/M2

## 2023-12-28 DIAGNOSIS — Z09 HOSPITAL DISCHARGE FOLLOW-UP: ICD-10-CM

## 2023-12-28 DIAGNOSIS — M54.41 ACUTE RIGHT-SIDED LOW BACK PAIN WITH RIGHT-SIDED SCIATICA: Primary | ICD-10-CM

## 2023-12-28 DIAGNOSIS — I10 ESSENTIAL (PRIMARY) HYPERTENSION: ICD-10-CM

## 2023-12-28 DIAGNOSIS — R82.4 URINE KETONES: ICD-10-CM

## 2023-12-28 DIAGNOSIS — I48.91 ATRIAL FIBRILLATION, UNSPECIFIED TYPE (HCC): ICD-10-CM

## 2023-12-28 PROCEDURE — 3075F SYST BP GE 130 - 139MM HG: CPT | Performed by: INTERNAL MEDICINE

## 2023-12-28 PROCEDURE — 3079F DIAST BP 80-89 MM HG: CPT | Performed by: INTERNAL MEDICINE

## 2023-12-28 PROCEDURE — 1111F DSCHRG MED/CURRENT MED MERGE: CPT | Performed by: INTERNAL MEDICINE

## 2023-12-28 PROCEDURE — 99214 OFFICE O/P EST MOD 30 MIN: CPT | Performed by: INTERNAL MEDICINE

## 2023-12-28 RX ORDER — MELOXICAM 15 MG/1
15 TABLET ORAL DAILY
Qty: 30 TABLET | Refills: 3 | Status: SHIPPED | OUTPATIENT
Start: 2023-12-28

## 2023-12-28 RX ORDER — APIXABAN 5 MG/1
5 TABLET, FILM COATED ORAL DAILY
Qty: 90 TABLET | Refills: 1 | Status: SHIPPED | OUTPATIENT
Start: 2023-12-28

## 2023-12-28 RX ORDER — METHYLPREDNISOLONE 4 MG/1
TABLET ORAL
Qty: 1 KIT | Refills: 0 | Status: SHIPPED | OUTPATIENT
Start: 2023-12-28 | End: 2024-01-03

## 2023-12-28 RX ORDER — LISINOPRIL 40 MG/1
40 TABLET ORAL DAILY
Qty: 90 TABLET | Refills: 1 | Status: SHIPPED | OUTPATIENT
Start: 2023-12-28

## 2023-12-28 RX ORDER — IBUPROFEN 400 MG/1
TABLET ORAL 4 TIMES DAILY PRN
COMMUNITY

## 2023-12-28 RX ORDER — CYCLOBENZAPRINE HCL 10 MG
10 TABLET ORAL 3 TIMES DAILY PRN
Qty: 30 TABLET | Refills: 0 | Status: SHIPPED | OUTPATIENT
Start: 2023-12-28 | End: 2024-01-07

## 2023-12-28 RX ORDER — HYDROCHLOROTHIAZIDE 25 MG/1
25 TABLET ORAL DAILY
Qty: 90 TABLET | Refills: 1 | Status: SHIPPED | OUTPATIENT
Start: 2023-12-28

## 2023-12-28 NOTE — PROGRESS NOTES
1. \"Have you been to the ER, urgent care clinic since your last visit? Hospitalized since your last visit? \"   Yes  Twice  Sciatic nerve injury  [facility]  A month ago    2. \"Have you seen or consulted any other health care providers outside of the [address] [place] [address] since your last visit? \" No    3. For patients aged 43-73: Has the patient had a colonoscopy / FIT/ Cologuard?    No

## 2023-12-28 NOTE — PROGRESS NOTES
Subjective    Ar Ledesma is a 48 y.o. male who presents today for the following: patient was seen for a follow up. Has been seen in the ED in the last 2 months twice. One in [facility] and back here in [place]. Reports right sided leg/back pain. Reports that this is ongoing. Reports pain at an 7 right now. Had been given Naproxen, and steroid injection. No falls. Afib: has been ok but reports ED with the use metoprolol. Denies, CP or SOB. HTN: has been ok at home at home with ranges in the low 140s. Has been drinking a little more in the last few weeks after working in [employer]. Ketones were also high. A1c was 6.2    Chief Complaint   Patient presents with    Follow-Up from Hospital    Hypertension    Lower Back Pain           PMH/PSH/Allergies/Social History/medication list and most recent studies reviewed with patient. reports that he has never smoked. He has never used smokeless tobacco.    reports current alcohol use of about 6.0 standard drinks of alcohol per week. Vitals:    12/28/23 0948   BP: 138/88   Pulse:    Resp:    Temp:    SpO2:       Past Medical History:   Diagnosis Date    Hypertension        Allergies   Allergen Reactions    Shellfish Allergy Anaphylaxis    Shellfish-Derived Products Anaphylaxis        Current Outpatient Medications on File Prior to Visit   Medication Sig Dispense Refill    ibuprofen (ADVIL;MOTRIN) 400 MG tablet Take by mouth 4 times daily as needed       No current facility-administered medications on file prior to visit. Review of Systems   Constitutional: Negative. Respiratory:  Negative for cough, chest tightness and shortness of breath. Cardiovascular: Negative. Gastrointestinal: Negative. Musculoskeletal: Negative. Neurological: Negative. Psychiatric/Behavioral: Negative. Objective    Physical Exam  Vitals and nursing note reviewed. Cardiovascular:      Rate and Rhythm: Normal rate and regular rhythm.

## 2024-12-11 DIAGNOSIS — I10 ESSENTIAL (PRIMARY) HYPERTENSION: ICD-10-CM

## 2024-12-11 DIAGNOSIS — I48.91 ATRIAL FIBRILLATION, UNSPECIFIED TYPE (HCC): ICD-10-CM

## 2024-12-11 RX ORDER — METOPROLOL TARTRATE 25 MG/1
25 TABLET, FILM COATED ORAL 2 TIMES DAILY
Qty: 30 TABLET | Refills: 0 | Status: SHIPPED | OUTPATIENT
Start: 2024-12-11 | End: 2024-12-26

## 2024-12-11 RX ORDER — HYDROCHLOROTHIAZIDE 25 MG/1
25 TABLET ORAL DAILY
Qty: 15 TABLET | Refills: 0 | Status: SHIPPED | OUTPATIENT
Start: 2024-12-11 | End: 2024-12-26

## 2024-12-11 RX ORDER — APIXABAN 5 MG/1
5 TABLET, FILM COATED ORAL DAILY
Qty: 15 TABLET | Refills: 0 | Status: SHIPPED | OUTPATIENT
Start: 2024-12-11 | End: 2024-12-26

## 2024-12-11 RX ORDER — LISINOPRIL 40 MG/1
40 TABLET ORAL DAILY
Qty: 15 TABLET | Refills: 0 | Status: SHIPPED | OUTPATIENT
Start: 2024-12-11 | End: 2024-12-26

## 2024-12-11 NOTE — TELEPHONE ENCOUNTER
Lov:12/28/23  Nov 12/31/24    [facility] [address]      lisinopril  Hydrochlorothiazide  Metoprolol  Eliquis    Pt is asking for a short fill until his next appointment

## 2025-01-26 DIAGNOSIS — I10 ESSENTIAL (PRIMARY) HYPERTENSION: ICD-10-CM

## 2025-01-27 RX ORDER — LISINOPRIL 40 MG/1
40 TABLET ORAL DAILY
Qty: 15 TABLET | Refills: 0 | OUTPATIENT
Start: 2025-01-27 | End: 2025-02-11

## 2025-03-10 ENCOUNTER — OFFICE VISIT (OUTPATIENT)
Age: 55
End: 2025-03-10
Payer: COMMERCIAL

## 2025-03-10 VITALS
DIASTOLIC BLOOD PRESSURE: 102 MMHG | TEMPERATURE: 98.1 F | SYSTOLIC BLOOD PRESSURE: 146 MMHG | BODY MASS INDEX: 31.57 KG/M2 | HEART RATE: 153 BPM | WEIGHT: 246 LBS | RESPIRATION RATE: 16 BRPM | HEIGHT: 74 IN | OXYGEN SATURATION: 94 %

## 2025-03-10 DIAGNOSIS — I48.91 ATRIAL FIBRILLATION, UNSPECIFIED TYPE (HCC): ICD-10-CM

## 2025-03-10 DIAGNOSIS — I10 ESSENTIAL (PRIMARY) HYPERTENSION: ICD-10-CM

## 2025-03-10 DIAGNOSIS — I10 ESSENTIAL (PRIMARY) HYPERTENSION: Primary | ICD-10-CM

## 2025-03-10 DIAGNOSIS — R73.03 PREDIABETES: ICD-10-CM

## 2025-03-10 DIAGNOSIS — R00.0 TACHYCARDIA: ICD-10-CM

## 2025-03-10 DIAGNOSIS — J02.9 SORE THROAT: ICD-10-CM

## 2025-03-10 DIAGNOSIS — M54.41 ACUTE RIGHT-SIDED LOW BACK PAIN WITH RIGHT-SIDED SCIATICA: ICD-10-CM

## 2025-03-10 LAB
GROUP A STREP ANTIGEN, POC: NEGATIVE
HBA1C MFR BLD: 6.3 %
VALID INTERNAL CONTROL, POC: NEGATIVE

## 2025-03-10 PROCEDURE — 83036 HEMOGLOBIN GLYCOSYLATED A1C: CPT | Performed by: CLINICAL NURSE SPECIALIST

## 2025-03-10 PROCEDURE — 99214 OFFICE O/P EST MOD 30 MIN: CPT | Performed by: CLINICAL NURSE SPECIALIST

## 2025-03-10 PROCEDURE — 87430 STREP A AG IA: CPT | Performed by: CLINICAL NURSE SPECIALIST

## 2025-03-10 PROCEDURE — 3077F SYST BP >= 140 MM HG: CPT | Performed by: CLINICAL NURSE SPECIALIST

## 2025-03-10 PROCEDURE — 3080F DIAST BP >= 90 MM HG: CPT | Performed by: CLINICAL NURSE SPECIALIST

## 2025-03-10 RX ORDER — LISINOPRIL 40 MG/1
40 TABLET ORAL DAILY
Qty: 30 TABLET | Refills: 3 | Status: SHIPPED | OUTPATIENT
Start: 2025-03-10 | End: 2025-07-08

## 2025-03-10 RX ORDER — HYDROCHLOROTHIAZIDE 25 MG/1
25 TABLET ORAL DAILY
Qty: 30 TABLET | Refills: 5 | Status: SHIPPED | OUTPATIENT
Start: 2025-03-10 | End: 2025-09-06

## 2025-03-10 RX ORDER — CLONIDINE HYDROCHLORIDE 0.1 MG/1
0.1 TABLET ORAL ONCE
Status: COMPLETED | OUTPATIENT
Start: 2025-03-10 | End: 2025-03-10

## 2025-03-10 RX ORDER — GUAIFENESIN 600 MG/1
1200 TABLET, EXTENDED RELEASE ORAL 2 TIMES DAILY
Qty: 40 TABLET | Refills: 0 | Status: SHIPPED | OUTPATIENT
Start: 2025-03-10 | End: 2025-03-20

## 2025-03-10 RX ORDER — CYCLOBENZAPRINE HCL 10 MG
10 TABLET ORAL 3 TIMES DAILY PRN
Qty: 30 TABLET | Refills: 0 | Status: SHIPPED | OUTPATIENT
Start: 2025-03-10 | End: 2025-03-20

## 2025-03-10 RX ORDER — APIXABAN 5 MG/1
5 TABLET, FILM COATED ORAL DAILY
Qty: 30 TABLET | Refills: 3 | Status: SHIPPED | OUTPATIENT
Start: 2025-03-10 | End: 2025-07-08

## 2025-03-10 RX ORDER — METOPROLOL TARTRATE 25 MG/1
25 TABLET, FILM COATED ORAL 2 TIMES DAILY
Qty: 60 TABLET | Refills: 3 | Status: SHIPPED | OUTPATIENT
Start: 2025-03-10 | End: 2025-07-08

## 2025-03-10 RX ADMIN — CLONIDINE HYDROCHLORIDE 0.1 MG: 0.1 TABLET ORAL at 11:24

## 2025-03-10 SDOH — ECONOMIC STABILITY: FOOD INSECURITY: WITHIN THE PAST 12 MONTHS, THE FOOD YOU BOUGHT JUST DIDN'T LAST AND YOU DIDN'T HAVE MONEY TO GET MORE.: NEVER TRUE

## 2025-03-10 SDOH — ECONOMIC STABILITY: FOOD INSECURITY: WITHIN THE PAST 12 MONTHS, YOU WORRIED THAT YOUR FOOD WOULD RUN OUT BEFORE YOU GOT MONEY TO BUY MORE.: NEVER TRUE

## 2025-03-10 ASSESSMENT — ENCOUNTER SYMPTOMS
COUGH: 1
SHORTNESS OF BREATH: 1
NAUSEA: 0
ABDOMINAL PAIN: 0
SINUS PAIN: 0
SORE THROAT: 1

## 2025-03-10 ASSESSMENT — PATIENT HEALTH QUESTIONNAIRE - PHQ9
SUM OF ALL RESPONSES TO PHQ QUESTIONS 1-9: 0
SUM OF ALL RESPONSES TO PHQ QUESTIONS 1-9: 0
1. LITTLE INTEREST OR PLEASURE IN DOING THINGS: NOT AT ALL
SUM OF ALL RESPONSES TO PHQ QUESTIONS 1-9: 0
2. FEELING DOWN, DEPRESSED OR HOPELESS: NOT AT ALL
SUM OF ALL RESPONSES TO PHQ QUESTIONS 1-9: 0

## 2025-03-10 NOTE — PROGRESS NOTES
Chief Complaint   Patient presents with    Breathing Problem     Thinks it may be due to gaining weight    Weight Gain     Stopped drinking for 3 months now and states he finds himself being hungry more and eating more.    Medication Refill       \"Have you been to the ER, urgent care clinic since your last visit?  Hospitalized since your last visit?\"    YES - [facility] - Suicidal    “Have you seen or consulted any other health care providers outside our system since your last visit?”    NO      “Have you had a colorectal cancer screening such as a colonoscopy/FIT/Cologuard?    NO    No colonoscopy on file  No cologuard on file  No FIT/FOBT on file   No flexible sigmoidoscopy on file

## 2025-03-10 NOTE — PROGRESS NOTES
Larry Samuel (:  1970) is a 54 y.o. male,Established patient, here for evaluation of the following chief complaint(s):  Breathing Problem (Thinks it may be due to gaining weight), Weight Gain (Stopped drinking for 3 months now and states he finds himself being hungry more and eating more.), and Medication Refill         Assessment & Plan  Essential (primary) hypertension    Uncontrolled. One time dose of clonidine on today, was advised to remain in office for reassessment however patient left. Resume home medications.     Orders:    cloNIDine (CATAPRES) tablet 0.1 mg    hydroCHLOROthiazide (HYDRODIURIL) 25 MG tablet; Take 1 tablet by mouth daily    lisinopril (PRINIVIL;ZESTRIL) 40 MG tablet; Take 1 tablet by mouth daily    metoprolol tartrate (LOPRESSOR) 25 MG tablet; Take 1 tablet by mouth 2 times daily    Comprehensive Metabolic Panel; Future    CBC with Auto Differential; Future    Prediabetes    A1c unchanged at 6.3, discussed need for lifestyle changes. Printed information provided.     Orders:    AMB POC HEMOGLOBIN A1C    Atrial fibrillation, unspecified type (HCC)    Stable, continue eliquis. Maintain scheduled follow up with cardiology.     Orders:    ELIQUIS 5 MG TABS tablet; Take 1 tablet by mouth daily    Sore throat    Advised on OTC per symptom management.     Orders:    AMB POC RAPID STREP TEST    Acute right-sided low back pain with right-sided sciatica    Start flexeril, advised on need to avoid NSAIDs given BP.     Orders:    cyclobenzaprine (FLEXERIL) 10 MG tablet; Take 1 tablet by mouth 3 times daily as needed for Muscle spasms    Tachycardia    Anticipate that clonidine will assist with lower HR. Needs to resume metoprolol ASAP.          Advised that it is imperative that he adheres to medication regimen and office visits.   Will follow up pending test results and discuss any additional plan of care.   Nurse visit in one week, in office visit with PCP in 4 weeks.   Encouraged to call

## 2025-03-10 NOTE — ASSESSMENT & PLAN NOTE
Stable, continue eliquis. Maintain scheduled follow up with cardiology.     Orders:    ELIQUIS 5 MG TABS tablet; Take 1 tablet by mouth daily

## 2025-03-19 ENCOUNTER — APPOINTMENT (OUTPATIENT)
Facility: HOSPITAL | Age: 55
DRG: 309 | End: 2025-03-19
Attending: HOSPITALIST
Payer: COMMERCIAL

## 2025-03-19 ENCOUNTER — TELEPHONE (OUTPATIENT)
Age: 55
End: 2025-03-19

## 2025-03-19 ENCOUNTER — APPOINTMENT (OUTPATIENT)
Facility: HOSPITAL | Age: 55
DRG: 309 | End: 2025-03-19
Payer: COMMERCIAL

## 2025-03-19 ENCOUNTER — HOSPITAL ENCOUNTER (INPATIENT)
Facility: HOSPITAL | Age: 55
LOS: 1 days | Discharge: ANOTHER HOSPITAL | DRG: 309 | End: 2025-03-19
Attending: EMERGENCY MEDICINE | Admitting: HOSPITALIST
Payer: COMMERCIAL

## 2025-03-19 ENCOUNTER — HOSPITAL ENCOUNTER (INPATIENT)
Facility: HOSPITAL | Age: 55
LOS: 4 days | Discharge: HOME | DRG: 308 | End: 2025-03-23
Attending: FAMILY MEDICINE | Admitting: FAMILY MEDICINE
Payer: COMMERCIAL

## 2025-03-19 VITALS
SYSTOLIC BLOOD PRESSURE: 107 MMHG | TEMPERATURE: 97.7 F | HEIGHT: 74 IN | HEART RATE: 116 BPM | RESPIRATION RATE: 18 BRPM | WEIGHT: 259.5 LBS | BODY MASS INDEX: 33.3 KG/M2 | DIASTOLIC BLOOD PRESSURE: 91 MMHG | OXYGEN SATURATION: 94 %

## 2025-03-19 DIAGNOSIS — I48.92 ATRIAL FIBRILLATION/FLUTTER (HCC): Primary | ICD-10-CM

## 2025-03-19 DIAGNOSIS — R09.89 PULMONARY VASCULAR CONGESTION: ICD-10-CM

## 2025-03-19 DIAGNOSIS — I25.118 CORONARY ARTERY DISEASE INVOLVING NATIVE CORONARY ARTERY OF NATIVE HEART WITH OTHER FORM OF ANGINA PECTORIS: ICD-10-CM

## 2025-03-19 DIAGNOSIS — I48.91 ATRIAL FIBRILLATION/FLUTTER (HCC): Primary | ICD-10-CM

## 2025-03-19 DIAGNOSIS — I50.9 ACUTE CONGESTIVE HEART FAILURE, UNSPECIFIED HEART FAILURE TYPE (HCC): ICD-10-CM

## 2025-03-19 DIAGNOSIS — R07.9 CHEST PAIN, UNSPECIFIED TYPE: ICD-10-CM

## 2025-03-19 DIAGNOSIS — I48.92 ATRIAL FLUTTER WITH RAPID VENTRICULAR RESPONSE (HCC): Primary | ICD-10-CM

## 2025-03-19 DIAGNOSIS — I16.0 HYPERTENSIVE URGENCY: ICD-10-CM

## 2025-03-19 DIAGNOSIS — F41.1 ANXIETY STATE: ICD-10-CM

## 2025-03-19 DIAGNOSIS — Z79.01 ON CONTINUOUS ORAL ANTICOAGULATION: ICD-10-CM

## 2025-03-19 DIAGNOSIS — R06.02 SHORTNESS OF BREATH: ICD-10-CM

## 2025-03-19 DIAGNOSIS — I48.91 ATRIAL FIBRILLATION, UNSPECIFIED TYPE (HCC): ICD-10-CM

## 2025-03-19 DIAGNOSIS — I50.9 CONGESTIVE HEART FAILURE, UNSPECIFIED HF CHRONICITY, UNSPECIFIED HEART FAILURE TYPE (HCC): ICD-10-CM

## 2025-03-19 LAB
ALBUMIN SERPL-MCNC: 3.9 G/DL (ref 3.5–5)
ALBUMIN/GLOB SERPL: 1.1 (ref 1.1–2.2)
ALP SERPL-CCNC: 61 U/L (ref 45–117)
ALT SERPL-CCNC: 31 U/L (ref 12–78)
ANION GAP SERPL CALC-SCNC: 10 MMOL/L (ref 2–12)
ANION GAP SERPL CALC-SCNC: 10 MMOL/L (ref 2–12)
AST SERPL-CCNC: 24 U/L (ref 15–37)
BASOPHILS # BLD: 0.01 K/UL (ref 0–0.1)
BASOPHILS # BLD: 0.03 K/UL (ref 0–0.1)
BASOPHILS NFR BLD: 0.2 % (ref 0–1)
BASOPHILS NFR BLD: 0.5 % (ref 0–1)
BILIRUB SERPL-MCNC: 0.6 MG/DL (ref 0.2–1)
BUN SERPL-MCNC: 22 MG/DL (ref 6–20)
BUN SERPL-MCNC: 23 MG/DL (ref 6–20)
BUN/CREAT SERPL: 15 (ref 12–20)
BUN/CREAT SERPL: 15 (ref 12–20)
CALCIUM SERPL-MCNC: 8.6 MG/DL (ref 8.5–10.1)
CALCIUM SERPL-MCNC: 8.7 MG/DL (ref 8.5–10.1)
CHLORIDE SERPL-SCNC: 105 MMOL/L (ref 97–108)
CHLORIDE SERPL-SCNC: 105 MMOL/L (ref 97–108)
CO2 SERPL-SCNC: 26 MMOL/L (ref 21–32)
CO2 SERPL-SCNC: 27 MMOL/L (ref 21–32)
CREAT SERPL-MCNC: 1.49 MG/DL (ref 0.7–1.3)
CREAT SERPL-MCNC: 1.58 MG/DL (ref 0.7–1.3)
DIFFERENTIAL METHOD BLD: ABNORMAL
DIFFERENTIAL METHOD BLD: ABNORMAL
ECHO AO ROOT DIAM: 3.7 CM
ECHO AO ROOT INDEX: 1.52 CM/M2
ECHO AV AREA PEAK VELOCITY: 3.5 CM2
ECHO AV AREA/BSA PEAK VELOCITY: 1.4 CM2/M2
ECHO AV PEAK GRADIENT: 1 MMHG
ECHO AV PEAK VELOCITY: 0.5 M/S
ECHO AV VELOCITY RATIO: 1
ECHO BSA: 2.48 M2
ECHO LA DIAMETER INDEX: 2.02 CM/M2
ECHO LA DIAMETER: 4.9 CM
ECHO LA TO AORTIC ROOT RATIO: 1.32
ECHO LA VOL A-L A2C: 152 ML (ref 18–58)
ECHO LA VOL A-L A4C: 129 ML (ref 18–58)
ECHO LA VOL BP: 133 ML (ref 18–58)
ECHO LA VOL MOD A2C: 147 ML (ref 18–58)
ECHO LA VOL MOD A4C: 122 ML (ref 18–58)
ECHO LA VOL/BSA BIPLANE: 55 ML/M2 (ref 16–34)
ECHO LA VOLUME AREA LENGTH: 140 ML
ECHO LA VOLUME INDEX A-L A2C: 63 ML/M2 (ref 16–34)
ECHO LA VOLUME INDEX A-L A4C: 53 ML/M2 (ref 16–34)
ECHO LA VOLUME INDEX AREA LENGTH: 58 ML/M2 (ref 16–34)
ECHO LA VOLUME INDEX MOD A2C: 60 ML/M2 (ref 16–34)
ECHO LA VOLUME INDEX MOD A4C: 50 ML/M2 (ref 16–34)
ECHO LV EDV A4C: 242 ML
ECHO LV EDV INDEX A4C: 100 ML/M2
ECHO LV EF PHYSICIAN: 20 %
ECHO LV EJECTION FRACTION A4C: 30 %
ECHO LV ESV A4C: 169 ML
ECHO LV ESV INDEX A4C: 70 ML/M2
ECHO LV FRACTIONAL SHORTENING: 18 % (ref 28–44)
ECHO LV INTERNAL DIMENSION DIASTOLE INDEX: 2.3 CM/M2
ECHO LV INTERNAL DIMENSION DIASTOLIC: 5.6 CM (ref 4.2–5.9)
ECHO LV INTERNAL DIMENSION SYSTOLIC INDEX: 1.89 CM/M2
ECHO LV INTERNAL DIMENSION SYSTOLIC: 4.6 CM
ECHO LV IVSD: 1.3 CM (ref 0.6–1)
ECHO LV MASS 2D: 264.7 G (ref 88–224)
ECHO LV MASS INDEX 2D: 108.9 G/M2 (ref 49–115)
ECHO LV POSTERIOR WALL DIASTOLIC: 1 CM (ref 0.6–1)
ECHO LV RELATIVE WALL THICKNESS RATIO: 0.36
ECHO LVOT AREA: 3.5 CM2
ECHO LVOT DIAM: 2.1 CM
ECHO LVOT PEAK GRADIENT: 1 MMHG
ECHO LVOT PEAK VELOCITY: 0.5 M/S
ECHO MV REGURGITANT ALIASING (NYQUIST) VELOCITY: 51 CM/S
ECHO MV REGURGITANT PEAK GRADIENT: 81 MMHG
ECHO MV REGURGITANT PEAK VELOCITY: 4.5 M/S
ECHO MV REGURGITANT RADIUS PISA: 2.39 CM
ECHO PV MAX VELOCITY: 0.4 M/S
ECHO PV PEAK GRADIENT: 1 MMHG
EKG ATRIAL RATE: 242 BPM
EKG ATRIAL RATE: 249 BPM
EKG DIAGNOSIS: NORMAL
EKG DIAGNOSIS: NORMAL
EKG Q-T INTERVAL: 344 MS
EKG Q-T INTERVAL: 430 MS
EKG QRS DURATION: 118 MS
EKG QRS DURATION: 130 MS
EKG QTC CALCULATION (BAZETT): 452 MS
EKG QTC CALCULATION (BAZETT): 548 MS
EKG R AXIS: 123 DEGREES
EKG R AXIS: 72 DEGREES
EKG T AXIS: 71 DEGREES
EKG T AXIS: 92 DEGREES
EKG VENTRICULAR RATE: 104 BPM
EKG VENTRICULAR RATE: 98 BPM
EOSINOPHIL # BLD: 0.12 K/UL (ref 0–0.4)
EOSINOPHIL # BLD: 0.17 K/UL (ref 0–0.4)
EOSINOPHIL NFR BLD: 2.4 % (ref 0–7)
EOSINOPHIL NFR BLD: 2.8 % (ref 0–7)
ERYTHROCYTE [DISTWIDTH] IN BLOOD BY AUTOMATED COUNT: 14.4 % (ref 11.5–14.5)
ERYTHROCYTE [DISTWIDTH] IN BLOOD BY AUTOMATED COUNT: 14.4 % (ref 11.5–14.5)
EST. AVERAGE GLUCOSE BLD GHB EST-MCNC: 131 MG/DL
ETHANOL SERPL-MCNC: <10 MG/DL (ref 0–0.08)
GLOBULIN SER CALC-MCNC: 3.6 G/DL (ref 2–4)
GLUCOSE BLD STRIP.AUTO-MCNC: 133 MG/DL (ref 65–117)
GLUCOSE SERPL-MCNC: 121 MG/DL (ref 65–100)
GLUCOSE SERPL-MCNC: 135 MG/DL (ref 65–100)
HBA1C MFR BLD: 6.2 % (ref 4–5.6)
HCT VFR BLD AUTO: 39.3 % (ref 36.6–50.3)
HCT VFR BLD AUTO: 43.2 % (ref 36.6–50.3)
HGB BLD-MCNC: 13.5 G/DL (ref 12.1–17)
HGB BLD-MCNC: 14.9 G/DL (ref 12.1–17)
IMM GRANULOCYTES # BLD AUTO: 0.02 K/UL (ref 0–0.04)
IMM GRANULOCYTES # BLD AUTO: 0.02 K/UL (ref 0–0.04)
IMM GRANULOCYTES NFR BLD AUTO: 0.3 % (ref 0–0.5)
IMM GRANULOCYTES NFR BLD AUTO: 0.4 % (ref 0–0.5)
LYMPHOCYTES # BLD: 1.73 K/UL (ref 0.8–3.5)
LYMPHOCYTES # BLD: 2.55 K/UL (ref 0.8–3.5)
LYMPHOCYTES NFR BLD: 35.1 % (ref 12–49)
LYMPHOCYTES NFR BLD: 42.6 % (ref 12–49)
MAGNESIUM SERPL-MCNC: 2.1 MG/DL (ref 1.6–2.4)
MCH RBC QN AUTO: 27.2 PG (ref 26–34)
MCH RBC QN AUTO: 27.4 PG (ref 26–34)
MCHC RBC AUTO-ENTMCNC: 34.4 G/DL (ref 30–36.5)
MCHC RBC AUTO-ENTMCNC: 34.5 G/DL (ref 30–36.5)
MCV RBC AUTO: 78.8 FL (ref 80–99)
MCV RBC AUTO: 79.7 FL (ref 80–99)
MONOCYTES # BLD: 0.37 K/UL (ref 0–1)
MONOCYTES # BLD: 0.45 K/UL (ref 0–1)
MONOCYTES NFR BLD: 7.5 % (ref 5–13)
MONOCYTES NFR BLD: 7.5 % (ref 5–13)
NEUTS SEG # BLD: 2.68 K/UL (ref 1.8–8)
NEUTS SEG # BLD: 2.77 K/UL (ref 1.8–8)
NEUTS SEG NFR BLD: 46.3 % (ref 32–75)
NEUTS SEG NFR BLD: 54.4 % (ref 32–75)
NRBC # BLD: 0 K/UL (ref 0–0.01)
NRBC # BLD: 0 K/UL (ref 0–0.01)
NRBC BLD-RTO: 0 PER 100 WBC
NRBC BLD-RTO: 0 PER 100 WBC
NT PRO BNP: 1474 PG/ML (ref 0–125)
PLATELET # BLD AUTO: 159 K/UL (ref 150–400)
PLATELET # BLD AUTO: 197 K/UL (ref 150–400)
PMV BLD AUTO: 10.4 FL (ref 8.9–12.9)
PMV BLD AUTO: 11.2 FL (ref 8.9–12.9)
POTASSIUM SERPL-SCNC: 3.4 MMOL/L (ref 3.5–5.1)
POTASSIUM SERPL-SCNC: 3.6 MMOL/L (ref 3.5–5.1)
PROT SERPL-MCNC: 7.5 G/DL (ref 6.4–8.2)
RBC # BLD AUTO: 4.93 M/UL (ref 4.1–5.7)
RBC # BLD AUTO: 5.48 M/UL (ref 4.1–5.7)
SERVICE CMNT-IMP: ABNORMAL
SODIUM SERPL-SCNC: 141 MMOL/L (ref 136–145)
SODIUM SERPL-SCNC: 142 MMOL/L (ref 136–145)
T4 FREE SERPL-MCNC: 0.9 NG/DL (ref 0.8–1.5)
TROPONIN I SERPL HS-MCNC: 17 NG/L (ref 0–76)
TROPONIN I SERPL HS-MCNC: 38 NG/L (ref 0–76)
TROPONIN I SERPL HS-MCNC: 39 NG/L (ref 0–76)
TROPONIN I SERPL HS-MCNC: 44 NG/L (ref 0–76)
TSH SERPL DL<=0.05 MIU/L-ACNC: 4.43 UIU/ML (ref 0.36–3.74)
WBC # BLD AUTO: 4.9 K/UL (ref 4.1–11.1)
WBC # BLD AUTO: 6 K/UL (ref 4.1–11.1)

## 2025-03-19 PROCEDURE — 83036 HEMOGLOBIN GLYCOSYLATED A1C: CPT

## 2025-03-19 PROCEDURE — 93005 ELECTROCARDIOGRAM TRACING: CPT | Performed by: EMERGENCY MEDICINE

## 2025-03-19 PROCEDURE — 93306 TTE W/DOPPLER COMPLETE: CPT | Performed by: SPECIALIST

## 2025-03-19 PROCEDURE — 82077 ASSAY SPEC XCP UR&BREATH IA: CPT

## 2025-03-19 PROCEDURE — 6370000000 HC RX 637 (ALT 250 FOR IP): Performed by: EMERGENCY MEDICINE

## 2025-03-19 PROCEDURE — 93306 TTE W/DOPPLER COMPLETE: CPT

## 2025-03-19 PROCEDURE — 80053 COMPREHEN METABOLIC PANEL: CPT

## 2025-03-19 PROCEDURE — 6360000002 HC RX W HCPCS: Performed by: NURSE PRACTITIONER

## 2025-03-19 PROCEDURE — 85025 COMPLETE CBC W/AUTO DIFF WBC: CPT

## 2025-03-19 PROCEDURE — 2580000003 HC RX 258: Performed by: NURSE PRACTITIONER

## 2025-03-19 PROCEDURE — 96374 THER/PROPH/DIAG INJ IV PUSH: CPT

## 2025-03-19 PROCEDURE — 6360000002 HC RX W HCPCS: Performed by: FAMILY MEDICINE

## 2025-03-19 PROCEDURE — 1200000000 HC SEMI PRIVATE

## 2025-03-19 PROCEDURE — 2580000003 HC RX 258: Performed by: EMERGENCY MEDICINE

## 2025-03-19 PROCEDURE — 93005 ELECTROCARDIOGRAM TRACING: CPT | Performed by: INTERNAL MEDICINE

## 2025-03-19 PROCEDURE — 71045 X-RAY EXAM CHEST 1 VIEW: CPT

## 2025-03-19 PROCEDURE — 6360000002 HC RX W HCPCS: Performed by: INTERNAL MEDICINE

## 2025-03-19 PROCEDURE — 83880 ASSAY OF NATRIURETIC PEPTIDE: CPT

## 2025-03-19 PROCEDURE — 6360000002 HC RX W HCPCS: Performed by: EMERGENCY MEDICINE

## 2025-03-19 PROCEDURE — 2060000000 HC ICU INTERMEDIATE R&B

## 2025-03-19 PROCEDURE — 2500000003 HC RX 250 WO HCPCS: Performed by: FAMILY MEDICINE

## 2025-03-19 PROCEDURE — 80048 BASIC METABOLIC PNL TOTAL CA: CPT

## 2025-03-19 PROCEDURE — 82962 GLUCOSE BLOOD TEST: CPT

## 2025-03-19 PROCEDURE — 6360000002 HC RX W HCPCS: Performed by: HOSPITALIST

## 2025-03-19 PROCEDURE — 6370000000 HC RX 637 (ALT 250 FOR IP): Performed by: HOSPITALIST

## 2025-03-19 PROCEDURE — 36415 COLL VENOUS BLD VENIPUNCTURE: CPT

## 2025-03-19 PROCEDURE — 84443 ASSAY THYROID STIM HORMONE: CPT

## 2025-03-19 PROCEDURE — 99291 CRITICAL CARE FIRST HOUR: CPT

## 2025-03-19 PROCEDURE — 93010 ELECTROCARDIOGRAM REPORT: CPT | Performed by: SPECIALIST

## 2025-03-19 PROCEDURE — 2500000003 HC RX 250 WO HCPCS: Performed by: EMERGENCY MEDICINE

## 2025-03-19 PROCEDURE — 84484 ASSAY OF TROPONIN QUANT: CPT

## 2025-03-19 PROCEDURE — 2500000003 HC RX 250 WO HCPCS: Performed by: INTERNAL MEDICINE

## 2025-03-19 PROCEDURE — 84439 ASSAY OF FREE THYROXINE: CPT

## 2025-03-19 PROCEDURE — 6360000002 HC RX W HCPCS

## 2025-03-19 PROCEDURE — 2500000003 HC RX 250 WO HCPCS: Performed by: HOSPITALIST

## 2025-03-19 PROCEDURE — 96375 TX/PRO/DX INJ NEW DRUG ADDON: CPT

## 2025-03-19 PROCEDURE — 6370000000 HC RX 637 (ALT 250 FOR IP): Performed by: INTERNAL MEDICINE

## 2025-03-19 PROCEDURE — 83735 ASSAY OF MAGNESIUM: CPT

## 2025-03-19 RX ORDER — METOPROLOL TARTRATE 25 MG/1
25 TABLET, FILM COATED ORAL 2 TIMES DAILY
Status: DISCONTINUED | OUTPATIENT
Start: 2025-03-19 | End: 2025-03-19

## 2025-03-19 RX ORDER — ENOXAPARIN SODIUM 100 MG/ML
30 INJECTION SUBCUTANEOUS 2 TIMES DAILY
Status: DISCONTINUED | OUTPATIENT
Start: 2025-03-19 | End: 2025-03-19

## 2025-03-19 RX ORDER — NITROGLYCERIN 0.4 MG/1
0.4 TABLET SUBLINGUAL EVERY 5 MIN PRN
Status: CANCELLED | OUTPATIENT
Start: 2025-03-19

## 2025-03-19 RX ORDER — MAGNESIUM SULFATE IN WATER 40 MG/ML
2000 INJECTION, SOLUTION INTRAVENOUS PRN
Status: DISCONTINUED | OUTPATIENT
Start: 2025-03-19 | End: 2025-03-19 | Stop reason: HOSPADM

## 2025-03-19 RX ORDER — POTASSIUM CHLORIDE 750 MG/1
40 TABLET, EXTENDED RELEASE ORAL ONCE
Status: COMPLETED | OUTPATIENT
Start: 2025-03-19 | End: 2025-03-19

## 2025-03-19 RX ORDER — NITROGLYCERIN 0.4 MG/1
0.4 TABLET SUBLINGUAL EVERY 5 MIN PRN
Status: DISCONTINUED | OUTPATIENT
Start: 2025-03-19 | End: 2025-03-19 | Stop reason: HOSPADM

## 2025-03-19 RX ORDER — NALOXONE HYDROCHLORIDE 0.4 MG/ML
0.4 INJECTION, SOLUTION INTRAMUSCULAR; INTRAVENOUS; SUBCUTANEOUS PRN
Status: DISCONTINUED | OUTPATIENT
Start: 2025-03-19 | End: 2025-03-19 | Stop reason: HOSPADM

## 2025-03-19 RX ORDER — LORAZEPAM 1 MG/1
1 TABLET ORAL EVERY 4 HOURS PRN
Status: CANCELLED | OUTPATIENT
Start: 2025-03-19

## 2025-03-19 RX ORDER — FUROSEMIDE 10 MG/ML
20 INJECTION INTRAMUSCULAR; INTRAVENOUS DAILY
Status: CANCELLED | OUTPATIENT
Start: 2025-03-20

## 2025-03-19 RX ORDER — ACETAMINOPHEN 325 MG/1
650 TABLET ORAL EVERY 6 HOURS PRN
Status: CANCELLED | OUTPATIENT
Start: 2025-03-19

## 2025-03-19 RX ORDER — POTASSIUM CHLORIDE 750 MG/1
40 TABLET, EXTENDED RELEASE ORAL PRN
Status: DISCONTINUED | OUTPATIENT
Start: 2025-03-19 | End: 2025-03-19 | Stop reason: HOSPADM

## 2025-03-19 RX ORDER — SODIUM CHLORIDE 0.9 % (FLUSH) 0.9 %
5-40 SYRINGE (ML) INJECTION PRN
Status: DISCONTINUED | OUTPATIENT
Start: 2025-03-19 | End: 2025-03-19 | Stop reason: HOSPADM

## 2025-03-19 RX ORDER — SODIUM CHLORIDE 0.9 % (FLUSH) 0.9 %
5-40 SYRINGE (ML) INJECTION PRN
Status: DISCONTINUED | OUTPATIENT
Start: 2025-03-19 | End: 2025-03-23 | Stop reason: HOSPADM

## 2025-03-19 RX ORDER — POTASSIUM CHLORIDE 750 MG/1
40 TABLET, EXTENDED RELEASE ORAL PRN
Status: CANCELLED | OUTPATIENT
Start: 2025-03-19

## 2025-03-19 RX ORDER — MORPHINE SULFATE 2 MG/ML
2 INJECTION, SOLUTION INTRAMUSCULAR; INTRAVENOUS EVERY 4 HOURS PRN
Status: DISCONTINUED | OUTPATIENT
Start: 2025-03-19 | End: 2025-03-20 | Stop reason: SDUPTHER

## 2025-03-19 RX ORDER — ACETAMINOPHEN 650 MG/1
650 SUPPOSITORY RECTAL EVERY 6 HOURS PRN
Status: DISCONTINUED | OUTPATIENT
Start: 2025-03-19 | End: 2025-03-19 | Stop reason: HOSPADM

## 2025-03-19 RX ORDER — LORAZEPAM 2 MG/ML
1 INJECTION INTRAMUSCULAR ONCE
Status: DISCONTINUED | OUTPATIENT
Start: 2025-03-19 | End: 2025-03-19

## 2025-03-19 RX ORDER — ONDANSETRON 4 MG/1
4 TABLET, ORALLY DISINTEGRATING ORAL EVERY 8 HOURS PRN
Status: CANCELLED | OUTPATIENT
Start: 2025-03-19

## 2025-03-19 RX ORDER — LISINOPRIL 20 MG/1
40 TABLET ORAL DAILY
Status: DISCONTINUED | OUTPATIENT
Start: 2025-03-19 | End: 2025-03-19 | Stop reason: HOSPADM

## 2025-03-19 RX ORDER — LORAZEPAM 2 MG/ML
INJECTION INTRAMUSCULAR
Status: COMPLETED
Start: 2025-03-19 | End: 2025-03-19

## 2025-03-19 RX ORDER — LORAZEPAM 1 MG/1
1 TABLET ORAL EVERY 4 HOURS PRN
Status: DISCONTINUED | OUTPATIENT
Start: 2025-03-19 | End: 2025-03-19 | Stop reason: HOSPADM

## 2025-03-19 RX ORDER — ACETAMINOPHEN 650 MG/1
650 SUPPOSITORY RECTAL EVERY 6 HOURS PRN
Status: DISCONTINUED | OUTPATIENT
Start: 2025-03-19 | End: 2025-03-20 | Stop reason: SDUPTHER

## 2025-03-19 RX ORDER — MORPHINE SULFATE 2 MG/ML
2 INJECTION, SOLUTION INTRAMUSCULAR; INTRAVENOUS
Status: DISCONTINUED | OUTPATIENT
Start: 2025-03-19 | End: 2025-03-19 | Stop reason: HOSPADM

## 2025-03-19 RX ORDER — LISINOPRIL 20 MG/1
40 TABLET ORAL DAILY
Status: CANCELLED | OUTPATIENT
Start: 2025-03-20

## 2025-03-19 RX ORDER — METOPROLOL TARTRATE 25 MG/1
25 TABLET, FILM COATED ORAL
Status: COMPLETED | OUTPATIENT
Start: 2025-03-19 | End: 2025-03-19

## 2025-03-19 RX ORDER — ACETAMINOPHEN 325 MG/1
650 TABLET ORAL EVERY 6 HOURS PRN
Status: DISCONTINUED | OUTPATIENT
Start: 2025-03-19 | End: 2025-03-20 | Stop reason: SDUPTHER

## 2025-03-19 RX ORDER — SODIUM CHLORIDE 9 MG/ML
INJECTION, SOLUTION INTRAVENOUS PRN
Status: CANCELLED | OUTPATIENT
Start: 2025-03-19

## 2025-03-19 RX ORDER — METOPROLOL TARTRATE 1 MG/ML
5 INJECTION, SOLUTION INTRAVENOUS ONCE
Status: COMPLETED | OUTPATIENT
Start: 2025-03-19 | End: 2025-03-19

## 2025-03-19 RX ORDER — SODIUM CHLORIDE 0.9 % (FLUSH) 0.9 %
5-40 SYRINGE (ML) INJECTION EVERY 12 HOURS SCHEDULED
Status: DISCONTINUED | OUTPATIENT
Start: 2025-03-19 | End: 2025-03-19 | Stop reason: HOSPADM

## 2025-03-19 RX ORDER — POLYETHYLENE GLYCOL 3350 17 G/17G
17 POWDER, FOR SOLUTION ORAL DAILY PRN
Status: DISCONTINUED | OUTPATIENT
Start: 2025-03-19 | End: 2025-03-19 | Stop reason: HOSPADM

## 2025-03-19 RX ORDER — METOPROLOL TARTRATE 50 MG
50 TABLET ORAL 2 TIMES DAILY
Status: DISCONTINUED | OUTPATIENT
Start: 2025-03-19 | End: 2025-03-19 | Stop reason: HOSPADM

## 2025-03-19 RX ORDER — ONDANSETRON 2 MG/ML
4 INJECTION INTRAMUSCULAR; INTRAVENOUS EVERY 6 HOURS PRN
Status: DISCONTINUED | OUTPATIENT
Start: 2025-03-19 | End: 2025-03-19 | Stop reason: HOSPADM

## 2025-03-19 RX ORDER — NALOXONE HYDROCHLORIDE 0.4 MG/ML
0.4 INJECTION, SOLUTION INTRAMUSCULAR; INTRAVENOUS; SUBCUTANEOUS PRN
Status: CANCELLED | OUTPATIENT
Start: 2025-03-19

## 2025-03-19 RX ORDER — MORPHINE SULFATE 2 MG/ML
2 INJECTION, SOLUTION INTRAMUSCULAR; INTRAVENOUS
Refills: 0 | Status: COMPLETED | OUTPATIENT
Start: 2025-03-19 | End: 2025-03-19

## 2025-03-19 RX ORDER — LORAZEPAM 2 MG/ML
0.5 INJECTION INTRAMUSCULAR ONCE
Status: COMPLETED | OUTPATIENT
Start: 2025-03-19 | End: 2025-03-19

## 2025-03-19 RX ORDER — ACETAMINOPHEN 325 MG/1
650 TABLET ORAL EVERY 6 HOURS PRN
Status: DISCONTINUED | OUTPATIENT
Start: 2025-03-19 | End: 2025-03-19 | Stop reason: HOSPADM

## 2025-03-19 RX ORDER — METOPROLOL TARTRATE 50 MG
50 TABLET ORAL 2 TIMES DAILY
Status: CANCELLED | OUTPATIENT
Start: 2025-03-19

## 2025-03-19 RX ORDER — POLYETHYLENE GLYCOL 3350 17 G/17G
17 POWDER, FOR SOLUTION ORAL DAILY PRN
Status: CANCELLED | OUTPATIENT
Start: 2025-03-19

## 2025-03-19 RX ORDER — FUROSEMIDE 10 MG/ML
20 INJECTION INTRAMUSCULAR; INTRAVENOUS
Status: COMPLETED | OUTPATIENT
Start: 2025-03-19 | End: 2025-03-19

## 2025-03-19 RX ORDER — MAGNESIUM SULFATE IN WATER 40 MG/ML
2000 INJECTION, SOLUTION INTRAVENOUS PRN
Status: CANCELLED | OUTPATIENT
Start: 2025-03-19

## 2025-03-19 RX ORDER — MORPHINE SULFATE 4 MG/ML
4 INJECTION, SOLUTION INTRAMUSCULAR; INTRAVENOUS EVERY 4 HOURS PRN
Status: DISCONTINUED | OUTPATIENT
Start: 2025-03-19 | End: 2025-03-19

## 2025-03-19 RX ORDER — AMIODARONE HYDROCHLORIDE 200 MG/1
400 TABLET ORAL 2 TIMES DAILY
Status: DISCONTINUED | OUTPATIENT
Start: 2025-03-19 | End: 2025-03-19

## 2025-03-19 RX ORDER — MORPHINE SULFATE 2 MG/ML
2 INJECTION, SOLUTION INTRAMUSCULAR; INTRAVENOUS
Refills: 0 | Status: CANCELLED | OUTPATIENT
Start: 2025-03-19

## 2025-03-19 RX ORDER — ONDANSETRON 4 MG/1
4 TABLET, ORALLY DISINTEGRATING ORAL EVERY 8 HOURS PRN
Status: DISCONTINUED | OUTPATIENT
Start: 2025-03-19 | End: 2025-03-19 | Stop reason: HOSPADM

## 2025-03-19 RX ORDER — SODIUM CHLORIDE 0.9 % (FLUSH) 0.9 %
5-40 SYRINGE (ML) INJECTION PRN
Status: CANCELLED | OUTPATIENT
Start: 2025-03-19

## 2025-03-19 RX ORDER — POTASSIUM CHLORIDE 7.45 MG/ML
10 INJECTION INTRAVENOUS PRN
Status: DISCONTINUED | OUTPATIENT
Start: 2025-03-19 | End: 2025-03-19 | Stop reason: HOSPADM

## 2025-03-19 RX ORDER — POTASSIUM CHLORIDE 7.45 MG/ML
10 INJECTION INTRAVENOUS PRN
Status: CANCELLED | OUTPATIENT
Start: 2025-03-19

## 2025-03-19 RX ORDER — DIAZEPAM 5 MG/1
5 TABLET ORAL ONCE
Status: COMPLETED | OUTPATIENT
Start: 2025-03-19 | End: 2025-03-19

## 2025-03-19 RX ORDER — ONDANSETRON 2 MG/ML
4 INJECTION INTRAMUSCULAR; INTRAVENOUS EVERY 6 HOURS PRN
Status: CANCELLED | OUTPATIENT
Start: 2025-03-19

## 2025-03-19 RX ORDER — FUROSEMIDE 10 MG/ML
20 INJECTION INTRAMUSCULAR; INTRAVENOUS DAILY
Status: DISCONTINUED | OUTPATIENT
Start: 2025-03-20 | End: 2025-03-19 | Stop reason: HOSPADM

## 2025-03-19 RX ORDER — SODIUM CHLORIDE 0.9 % (FLUSH) 0.9 %
5-40 SYRINGE (ML) INJECTION EVERY 12 HOURS SCHEDULED
Status: DISCONTINUED | OUTPATIENT
Start: 2025-03-19 | End: 2025-03-23 | Stop reason: HOSPADM

## 2025-03-19 RX ORDER — HYDROCHLOROTHIAZIDE 25 MG/1
25 TABLET ORAL DAILY
Status: DISCONTINUED | OUTPATIENT
Start: 2025-03-19 | End: 2025-03-19

## 2025-03-19 RX ORDER — SODIUM CHLORIDE 9 MG/ML
INJECTION, SOLUTION INTRAVENOUS PRN
Status: DISCONTINUED | OUTPATIENT
Start: 2025-03-19 | End: 2025-03-19 | Stop reason: HOSPADM

## 2025-03-19 RX ORDER — ACETAMINOPHEN 650 MG/1
650 SUPPOSITORY RECTAL EVERY 6 HOURS PRN
Status: CANCELLED | OUTPATIENT
Start: 2025-03-19

## 2025-03-19 RX ORDER — POLYETHYLENE GLYCOL 3350 17 G/17G
17 POWDER, FOR SOLUTION ORAL DAILY PRN
Status: DISCONTINUED | OUTPATIENT
Start: 2025-03-19 | End: 2025-03-20

## 2025-03-19 RX ORDER — SODIUM CHLORIDE 0.9 % (FLUSH) 0.9 %
5-40 SYRINGE (ML) INJECTION EVERY 12 HOURS SCHEDULED
Status: CANCELLED | OUTPATIENT
Start: 2025-03-19

## 2025-03-19 RX ADMIN — NITROGLYCERIN 0.4 MG: 0.4 TABLET, ORALLY DISINTEGRATING SUBLINGUAL at 07:33

## 2025-03-19 RX ADMIN — LIDOCAINE HYDROCHLORIDE 40 ML: 20 SOLUTION ORAL at 04:39

## 2025-03-19 RX ADMIN — MORPHINE SULFATE 2 MG: 2 INJECTION, SOLUTION INTRAMUSCULAR; INTRAVENOUS at 19:07

## 2025-03-19 RX ADMIN — METOPROLOL TARTRATE 25 MG: 25 TABLET, FILM COATED ORAL at 09:55

## 2025-03-19 RX ADMIN — METOPROLOL TARTRATE 5 MG: 5 INJECTION INTRAVENOUS at 03:31

## 2025-03-19 RX ADMIN — DIAZEPAM 5 MG: 5 TABLET ORAL at 03:57

## 2025-03-19 RX ADMIN — SODIUM CHLORIDE, PRESERVATIVE FREE 10 ML: 5 INJECTION INTRAVENOUS at 09:32

## 2025-03-19 RX ADMIN — METOPROLOL TARTRATE 25 MG: 25 TABLET, FILM COATED ORAL at 03:31

## 2025-03-19 RX ADMIN — LORAZEPAM 0.5 MG: 2 INJECTION INTRAMUSCULAR at 19:08

## 2025-03-19 RX ADMIN — NITROGLYCERIN 0.4 MG: 0.4 TABLET, ORALLY DISINTEGRATING SUBLINGUAL at 07:01

## 2025-03-19 RX ADMIN — LORAZEPAM 1 MG: 1 TABLET ORAL at 17:16

## 2025-03-19 RX ADMIN — MORPHINE SULFATE 2 MG: 2 INJECTION, SOLUTION INTRAMUSCULAR; INTRAVENOUS at 21:14

## 2025-03-19 RX ADMIN — LORAZEPAM 1 MG: 1 TABLET ORAL at 12:50

## 2025-03-19 RX ADMIN — MORPHINE SULFATE 2 MG: 2 INJECTION, SOLUTION INTRAMUSCULAR; INTRAVENOUS at 03:32

## 2025-03-19 RX ADMIN — FUROSEMIDE 20 MG: 10 INJECTION, SOLUTION INTRAMUSCULAR; INTRAVENOUS at 04:46

## 2025-03-19 RX ADMIN — POTASSIUM CHLORIDE 40 MEQ: 750 TABLET, FILM COATED, EXTENDED RELEASE ORAL at 18:46

## 2025-03-19 RX ADMIN — SODIUM CHLORIDE, PRESERVATIVE FREE 10 ML: 5 INJECTION INTRAVENOUS at 21:15

## 2025-03-19 RX ADMIN — ONDANSETRON 4 MG: 2 INJECTION INTRAMUSCULAR; INTRAVENOUS at 19:09

## 2025-03-19 RX ADMIN — AMIODARONE HYDROCHLORIDE 1 MG/MIN: 50 INJECTION, SOLUTION INTRAVENOUS at 15:50

## 2025-03-19 RX ADMIN — APIXABAN 5 MG: 5 TABLET, FILM COATED ORAL at 09:32

## 2025-03-19 RX ADMIN — MORPHINE SULFATE 2 MG: 2 INJECTION, SOLUTION INTRAMUSCULAR; INTRAVENOUS at 09:56

## 2025-03-19 RX ADMIN — MORPHINE SULFATE 2 MG: 2 INJECTION, SOLUTION INTRAMUSCULAR; INTRAVENOUS at 18:47

## 2025-03-19 RX ADMIN — SODIUM CHLORIDE 40 MG: 9 INJECTION INTRAMUSCULAR; INTRAVENOUS; SUBCUTANEOUS at 04:33

## 2025-03-19 RX ADMIN — AMIODARONE HYDROCHLORIDE 1 MG/MIN: 1.8 INJECTION, SOLUTION INTRAVENOUS at 21:25

## 2025-03-19 RX ADMIN — LORAZEPAM 0.5 MG: 2 INJECTION INTRAMUSCULAR; INTRAVENOUS at 19:08

## 2025-03-19 RX ADMIN — METOPROLOL TARTRATE 5 MG: 5 INJECTION INTRAVENOUS at 08:55

## 2025-03-19 RX ADMIN — POTASSIUM CHLORIDE 40 MEQ: 750 TABLET, FILM COATED, EXTENDED RELEASE ORAL at 04:45

## 2025-03-19 ASSESSMENT — PAIN SCALES - GENERAL
PAINLEVEL_OUTOF10: 5
PAINLEVEL_OUTOF10: 8

## 2025-03-19 ASSESSMENT — PAIN DESCRIPTION - DESCRIPTORS
DESCRIPTORS: HEAVINESS
DESCRIPTORS: PRESSURE

## 2025-03-19 ASSESSMENT — LIFESTYLE VARIABLES
HOW OFTEN DO YOU HAVE A DRINK CONTAINING ALCOHOL: NEVER
HOW MANY STANDARD DRINKS CONTAINING ALCOHOL DO YOU HAVE ON A TYPICAL DAY: PATIENT DOES NOT DRINK

## 2025-03-19 ASSESSMENT — PAIN - FUNCTIONAL ASSESSMENT: PAIN_FUNCTIONAL_ASSESSMENT: 0-10

## 2025-03-19 ASSESSMENT — PAIN DESCRIPTION - ORIENTATION
ORIENTATION: MID
ORIENTATION: MID
ORIENTATION: LEFT

## 2025-03-19 ASSESSMENT — PAIN DESCRIPTION - LOCATION
LOCATION: CHEST

## 2025-03-19 NOTE — PROGRESS NOTES
0730: verbal report received from ALAYNA Delacruz.  Pt having consistent, persistent chest pain.  2 doses of sublingual nitro given by ALAYNA Delacruz    0830: pt still having persistent chest pain at this time, Juan R notified.  Will order EKG and IV metoprolol.  Additional troponin drawn at this time.  Echo tech at bedside.    1230: pt feeling anxious, states he feels like the thompson are closing in, Juan R notified.  PRN PO ativan ordered      1751: verbal report called to Anibal Nixon RN at [facility]    1842: pt had episode of shortness of breath, Dr. Pete notified, Dr. Pete to bedside, dose of morphine given and PO potassium.    1900: pt now nauseous, PRN zofran given, additional dose of morphine and ativan given for transport. [facility] arrived.    1931: pt transported to [facility] at this time with RAA on amiodarone gtt.

## 2025-03-19 NOTE — CONSULTS
[facility] CARDIOLOGY  Cardiology Care Note                  [x]Initial visit     []Established visit     Patient Name: Larry Samuel - :1970 - MRN:[id number]  Primary Cardiologist: None (previously Dr. Rodriguez)  Consulting Cardiologist: Larry Mccarthy NP; Arash Whitehead III     Reason for consult: Rapid atrial flutter, CHF    HPI:     This is a 54-year-old history including hypertension, PAF s/p cardioversion x 2, NELIDA, and EtOH abuse who decompensated heart failure and rapid atrial flutter.    Patient states last week he started to develop a sensation of exertional palpitations, easy fatigability, marked dyspnea.  He has also been having intermittent episodes of substernal chest pressure, which he experienced with prior episodes of rapid afib/flutter.      In the ED he was found to have atrial flutter that has persisted with rates between 110 - 130 beats/min despite compliance with his home metoprolol.  He had inadvertently been taking Eliquis only once daily instead of twice daily.    Troponin flat at 39.  proBNP elevated to 1474.  Echocardiogram completed and shows LVEF of 20 to 25%, moderate to severe MR, and RV dysfunction as well.  Feeling better with diuretics.   Remains in A flutter 115 - 125 for the most part.    Previous cardiologist was Dr. Rodriguez.  Believes he was first diagnosed with PAF in .  History of successful JOSELINE cardioversion in .  CHF onset as early as 2018.  CAD with coronary CTA demonstrating 50-60% non-calcified LAD stenosis in 2018.       Assessment and Plan     CHF  Rapid Atrial Flutter  -Suspect subacute to chronic rapid A fib / flutter is causing tachycardia-mediated CM.  -Sub-optimal anticoagulation with only once daily Eliquis. Increase to therapeutic 5 mg twice daily dosing.   -Started on amiodarone drip and transfer for JOSELINE with possible cardioversion.  -Rhythm control could be difficult  [phone]

## 2025-03-19 NOTE — ED NOTES
TRANSFER - OUT REPORT:    Verbal report given to Nubia CATALAN on Larry Samuel  being transferred to [facility] PCU4 for routine progression of patient care       Report consisted of patient's Situation, Background, Assessment and   Recommendations(SBAR).     Information from the following report(s) Nurse Handoff Report, Index, ED Encounter Summary, ED SBAR, Adult Overview, Surgery Report, Intake/Output, MAR, Recent Results, Med Rec Status, and Cardiac Rhythm A. Fib with PVCs  was reviewed with the receiving nurse.    Agness Fall Assessment:    Presents to emergency department  because of falls (Syncope, seizure, or loss of consciousness): No  Age > 70: No  Altered Mental Status, Intoxication with alcohol or substance confusion (Disorientation, impaired judgment, poor safety awaremess, or inability to follow instructions): No  Impaired Mobility: Ambulates or transfers with assistive devices or assistance; Unable to ambulate or transer.: No  Nursing Judgement: No          Lines:   Peripheral IV 03/19/25 Right Antecubital (Active)        Opportunity for questions and clarification was provided.      Patient transported with:  Monitor and Registered Nurse

## 2025-03-19 NOTE — PROGRESS NOTES
Progress  Note    NAME: Larry Samuel   :  1970   MRN:  [id number]     Date/Time:  3/19/2025 4:47 AM    Patient PCP: Martha Sanabria APRN - NP  _____________________________________________________________________  Given the patient's current clinical presentation, I have a high level of concern for decompensation if discharged from the emergency department.  Complex decision making was performed, which includes reviewing the patient's available past medical records, laboratory results, and x-ray films.       My assessment of this patient's clinical condition and my plan of care is as follows.    Assessment / Plan:      1 Atrial fibrillation/flutter   with  RVR   2. Pulmonary vascular congestion    3. Likely Acute congestive heart failure   4. Hypertension uncontrolled   5. Chest pain          Admit to Hospitalist service    Tele   Serial  troponin  Rate control     cont AC   CHF pathway   Strict Intake Output record  ,Daily Weight , Fluid Restriction ,Low Na Diet  2d Echo  Diuresis   Monitor Lytes  and Cr     Avoid Nephrotoxic  Meds  Adjust meds to GFR          Other Past Medical History :    Hx  of afib/flutter on eliquis and metoprolol  HTN    Pharmacy  Consulted  &   Nurses Communication  Ordered for Home  Medication  Reconciliation.Primary hospitalitis Physician team to Follow up with that in am and reconcile home medication       Code Status: Full  DVT Prophylaxis: sq lovenox  GI Prophylaxis: not indicated  Baseline: independent        Subjective:   CHIEF COMPLAINT:  Chest Pain  Shortness of Breath    Pt presents to ED with c/o intermittent mid chest pain & SOB for 1 week. Pt reports hx of A fib & is compliant with Eliquis.                 HISTORY OF PRESENT ILLNESS:       Larry Samuel, 54 y.o. male with past medical history as documented below presents to the ED with c/o of shortness of breath and chest tightness for the past week.  Notes a history of atrial fibrillation and has been taking  Lymphocytes % 42.6 12.0 - 49.0 %    Monocytes % 7.5 5.0 - 13.0 %    Eosinophils % 2.8 0.0 - 7.0 %    Basophils % 0.5 0.0 - 1.0 %    Immature Granulocytes % 0.3 0.0 - 0.5 %    Neutrophils Absolute 2.77 1.80 - 8.00 K/UL    Lymphocytes Absolute 2.55 0.80 - 3.50 K/UL    Monocytes Absolute 0.45 0.00 - 1.00 K/UL    Eosinophils Absolute 0.17 0.00 - 0.40 K/UL    Basophils Absolute 0.03 0.00 - 0.10 K/UL    Immature Granulocytes Absolute 0.02 0.00 - 0.04 K/UL    Differential Type AUTOMATED     Comprehensive Metabolic Panel    Collection Time: 03/19/25  3:19 AM   Result Value Ref Range    Sodium 141 136 - 145 mmol/L    Potassium 3.4 (L) 3.5 - 5.1 mmol/L    Chloride 105 97 - 108 mmol/L    CO2 26 21 - 32 mmol/L    Anion Gap 10 2 - 12 mmol/L    Glucose 135 (H) 65 - 100 mg/dL    BUN 23 (H) 6 - 20 MG/DL    Creatinine 1.58 (H) 0.70 - 1.30 MG/DL    BUN/Creatinine Ratio 15 12 - 20      Est, Glom Filt Rate 52 (L) >60 ml/min/1.73m2    Calcium 8.7 8.5 - 10.1 MG/DL    Total Bilirubin 0.6 0.2 - 1.0 MG/DL    ALT 31 12 - 78 U/L    AST 24 15 - 37 U/L    Alk Phosphatase 61 45 - 117 U/L    Total Protein 7.5 6.4 - 8.2 g/dL    Albumin 3.9 3.5 - 5.0 g/dL    Globulin 3.6 2.0 - 4.0 g/dL    Albumin/Globulin Ratio 1.1 1.1 - 2.2     Troponin    Collection Time: 03/19/25  3:19 AM   Result Value Ref Range    Troponin, High Sensitivity 44 0 - 76 ng/L   TSH    Collection Time: 03/19/25  3:19 AM   Result Value Ref Range    TSH, 3rd Generation 4.43 (H) 0.36 - 3.74 uIU/mL   Magnesium    Collection Time: 03/19/25  3:19 AM   Result Value Ref Range    Magnesium 2.1 1.6 - 2.4 mg/dL   Brain Natriuretic Peptide    Collection Time: 03/19/25  3:19 AM   Result Value Ref Range    NT Pro-BNP 1,474 (H) 0 - 125 PG/ML   Ethanol    Collection Time: 03/19/25  3:19 AM   Result Value Ref Range    Ethanol Lvl <10 <10 MG/DL

## 2025-03-19 NOTE — PROGRESS NOTES
0600: Pt arrived to PCU 4 from ED via stretcher.  Pt alert and oriented x4, ambulating independently with steady gait.  Pt oriented to room, obtained standing weight and accurate height.  IV flushed, patent, saline locked and fresh cap applied.  Assessment questions answered to best of pt ability.  Vitals obtained.    Plan of care reviewed with pt.    0644: Pt having chest pain 8/10 unrelieved from medications given in ER.  States it is \"pressure\".  Dr. DAMARI Washington notified    0701: 0.4mg Nitro SL given to pt    0730: Pt reports chest pain unresolved.  Another 0.4mg of Nitro given.

## 2025-03-19 NOTE — PLAN OF CARE
Problem: Discharge Planning  Goal: Discharge to home or other facility with appropriate resources  Outcome: Progressing     Problem: Pain  Goal: Verbalizes/displays adequate comfort level or baseline comfort level  Outcome: Progressing     Problem: ABCDS Injury Assessment  Goal: Absence of physical injury  Outcome: Progressing     Problem: Cardiovascular - Adult  Goal: Maintains optimal cardiac output and hemodynamic stability  Reactivated  Goal: Absence of cardiac dysrhythmias or at baseline  Reactivated     Problem: Metabolic/Fluid and Electrolytes - Adult  Goal: Electrolytes maintained within normal limits  Reactivated

## 2025-03-19 NOTE — DISCHARGE SUMMARY
Discharge Summary   Please note that this dictation was completed with [employer], the computer voice recognition software.  Quite often unanticipated grammatical, syntax, homophones, and other interpretive errors are inadvertently transcribed by the computer software.  Please disregard these errors.  Please excuse any errors that have escaped final proofreading.    PATIENT ID: Larry Samuel  MRN: [id number]   YOB: 1970    DATE OF ADMISSION: 3/19/2025  3:03 AM    DATE OF DISCHARGE: 3/19/2025  PRIMARY CARE PROVIDER: Martha Sanabria, APRN - NP         ATTENDING PHYSICIAN: Grzegorz Pete MD  DISCHARGING PROVIDER: Grzegorz Pete MD       CONSULTATIONS: IP CONSULT TO HOSPITALIST  IP CONSULT TO PHARMACY  IP CONSULT TO CARDIOLOGY  IP CONSULT TO HEART FAILURE NURSE/COORDINATOR  IP CONSULT TO HEART FAILURE NURSE/COORDINATOR    PROCEDURES/SURGERIES: * No surgery found *    ADMITTING HPI from excerpted H&P   Larry Samuel, 54 y.o. male with past medical history of CHF and afib presents to the ED with c/o of shortness of breath and chest tightness for the past few weeks. Has a history of noncomplaince with his medications but restarted taking them the last week.  Has been taking eliquis once a day instead of twice a day.  States that he recently saw his PCP who started him on clonidine due to elevated blood pressure. He has noticed in the last week he developed exertional dyspnea which progressively has worsened, orthopnea, PND, and sleeps with 3 pillows. However he arrived to the ED due to week long intermittent chest pain \"feels like a ton of pressure\"  ECG showed aflutter and patient was admitted for afib with RVR     Currently, patient denies headache, vision or hearing changes, fever, chills, weakness, cough, abd pain, N,V,D,C, blood in stool, melena, blood in urine, numbness or tingling in extremities.        HOSPITAL COURSE & DISCHARGE DIAGNOSIS/ PLAN:     Afib with RVR  -home meds:  metoprolol,

## 2025-03-19 NOTE — ED PROVIDER NOTES
EMERGENCY DEPARTMENT HISTORY AND PHYSICAL EXAM            Please note that this dictation was completed with the assistance of \"[employer]\", the computer voice recognition software. Quite often unanticipated grammatical, syntax, homophones, and other interpretive errors are inadvertently transcribed by the computer software. Please disregard these errors and any errors that have escaped final proofreading. Thank you.    Date of Evaluation: 03/19/25  Patient: Larry Samuel  Patient Age and Sex: 54 y.o. male   MRN: [id number]  CSN: [id number]  PCP: Martha Sanabria, APRN - NP    History of Present Illness     Chief Complaint   Patient presents with    Chest Pain    Shortness of Breath     History Provided By: Patient/family/EMS (if available)    History is limited by: Nothing     HPI: Larry Samuel, 54 y.o. male with past medical history as documented below presents to the ED with c/o of shortness of breath and chest tightness for the past week.  Notes a history of atrial fibrillation and has been taking metoprolol.  Patient is also compliant with Eliquis.  States that he recently saw his PCP who started him on clonidine due to elevated blood pressure.  Patient notes compliance with his medications.  Denies any recent prolonged travel, history of MI or CHF.  Patient states he feels like he has fluid in his lungs.. Pt denies any other exacerbating or ameliorating factors. There are no other complaints, changes or physical findings pertinent to the HPI at this time.    Nursing notes were all reviewed and agreed with or any disagreements were addressed in the HPI.    Past History   Past Medical History:  Past Medical History:   Diagnosis Date    Hypertension        Past Surgical History:  History reviewed. No pertinent surgical history.    Family History:   Family history reviewed and was non-contributory, unless specified below:  History reviewed. No pertinent family history.    Social History:  Social History     Tobacco  prevent further life threatening deterioration of the patient’s condition requiring frequent assessments and interventions.    Shemar Sanchez MD    ADMIT  Given the patient's current clinical presentation, I have a high level of concern for decompensation if discharged from the emergency department.     Patient is being admitted to the hospital.  The results of their tests and reasons for their admission have been discussed with them and/or available family.  They convey agreement and understanding for the need to be admitted and for their admission diagnosis.  Consultation has been made with the inpatient physician specialist for hospitalization.                          CLINICAL IMPRESSION     1. Atrial fibrillation/flutter (HCC)    2. Pulmonary vascular congestion    3. Acute congestive heart failure, unspecified heart failure type (HCC)    4. Hypertensive urgency    5. Anxiety state    6. On continuous oral anticoagulation    7. Shortness of breath    8. Coronary artery disease involving native coronary artery of native heart with other form of angina pectoris    9. Chest pain, unspecified type      Attestation:  I am the attending of record for this patient. I personally performed the services described in this documentation on this date, 3/19/2025 for patientLarry. I have reviewed the chart and verified that the record is accurate and complete.      Shemar Sanchez MD (Electronic Signature)         Shemar Sanchez MD  03/19/25 7232       Shemar Sanchez MD  03/19/25 0186       Shemar Sanchez MD  03/21/25 0593

## 2025-03-19 NOTE — H&P
History and Physical    Date of Service:  3/19/2025  Primary Care Provider: Martha Sanabria APRN - NP  Source of information: patient    Chief Complaint: Chest Pain and Shortness of Breath      History of Presenting Illness:   Larry Samuel, 54 y.o. male with past medical history of CHF and afib presents to the ED with c/o of shortness of breath and chest tightness for the past few weeks. Has a history of noncomplaince with his medications but restarted taking them the last week.  Has been taking eliquis once a day instead of twice a day.  States that he recently saw his PCP who started him on clonidine due to elevated blood pressure. He has noticed in the last week he developed exertional dyspnea which progressively has worsened, orthopnea, PND, and sleeps with 3 pillows. However he arrived to the ED due to week long intermittent chest pain \"feels like a ton of pressure\"  ECG showed aflutter and patient was admitted for afib with RVR    Currently, patient denies headache, vision or hearing changes, fever, chills, weakness, cough, abd pain, N,V,D,C, blood in stool, melena, blood in urine, numbness or tingling in extremities.     REVIEW OF SYSTEMS:  Per HPI     Past Medical History:   Diagnosis Date    Hypertension       History reviewed. No pertinent surgical history.  Prior to Admission medications    Medication Sig Start Date End Date Taking? Authorizing Provider   ELIQUIS 5 MG TABS tablet Take 1 tablet by mouth daily 3/10/25 7/8/25 Yes Elida Castro APRN - CNP   hydroCHLOROthiazide (HYDRODIURIL) 25 MG tablet Take 1 tablet by mouth daily 3/10/25 9/6/25 Yes Elida Castro APRN - CNP   lisinopril (PRINIVIL;ZESTRIL) 40 MG tablet Take 1 tablet by mouth daily 3/10/25 7/8/25 Yes Elida Castro APRN - CNP   metoprolol tartrate (LOPRESSOR) 25 MG tablet Take 1 tablet by mouth 2 times daily 3/10/25 7/8/25 Yes Elida Castro APRN - CNP   cyclobenzaprine (FLEXERIL) 10 MG tablet Take 1 tablet by  been completed with [employer], the computer voice recognition software.  Quite often unanticipated grammatical, syntax, homophones, and other interpretive errors are inadvertently transcribed by the computer software.  Please disregard these errors.  Please excuse any errors that have escaped final proofreading.

## 2025-03-19 NOTE — PLAN OF CARE
Problem: Discharge Planning  Goal: Discharge to home or other facility with appropriate resources  3/19/2025 0742 by Jessica Guerrero RN  Outcome: Progressing  3/19/2025 0742 by Jessica Guerrero RN  Outcome: Progressing  3/19/2025 0648 by Nubia Guzman RN  Outcome: Progressing     Problem: Pain  Goal: Verbalizes/displays adequate comfort level or baseline comfort level  3/19/2025 0742 by Jessica Guerrero RN  Outcome: Progressing  3/19/2025 0742 by Jessica Guerrero RN  Outcome: Progressing  3/19/2025 0648 by Nubia Guzman RN  Outcome: Progressing     Problem: ABCDS Injury Assessment  Goal: Absence of physical injury  3/19/2025 0742 by Jessica Guerrero RN  Outcome: Progressing  3/19/2025 0742 by Jessica Guerrero RN  Outcome: Progressing  3/19/2025 0648 by Nubia Guzman RN  Outcome: Progressing     Problem: Cardiovascular - Adult  Goal: Maintains optimal cardiac output and hemodynamic stability  3/19/2025 0742 by Jessica Guerrero RN  Outcome: Progressing  3/19/2025 0742 by Jessica Guerrero RN  Outcome: Progressing  3/19/2025 0648 by Nubia Guzman RN  Reactivated  Goal: Absence of cardiac dysrhythmias or at baseline  3/19/2025 0742 by Jessica Guerrero RN  Outcome: Progressing  3/19/2025 0742 by Jessica Guerrero RN  Outcome: Progressing  3/19/2025 0648 by Nubia Guzman RN  Reactivated     Problem: Metabolic/Fluid and Electrolytes - Adult  Goal: Electrolytes maintained within normal limits  3/19/2025 0742 by Jessica Guerrero RN  Outcome: Progressing  3/19/2025 0742 by Jessica Guerrero RN  Outcome: Progressing  3/19/2025 0648 by Nubia Guzman RN  Reactivated

## 2025-03-19 NOTE — ED TRIAGE NOTES
Pt presents to ED with c/o intermittent mid chest pain & SOB for 1 week. Pt reports hx of A fib & is compliant with Eliquis.

## 2025-03-19 NOTE — ED NOTES
See triage note.  Pt is alert and oriented x 4, RR even and unlabored, skin is warm and dry. Assesment completed and pt updated on plan of care.       Emergency Department Nursing Plan of Care       The Nursing Plan of Care is developed from the Nursing assessment and Emergency Department Attending provider initial evaluation.  The plan of care may be reviewed in the “ED Provider note”.    The Plan of Care was developed with the following considerations:   Patient / Family readiness to learn indicated by:verbalized understanding  Persons(s) to be included in education: patient  Barriers to Learning/Limitations:None    Signed     Desiree Lopez RN    3/19/2025   3:39 AM

## 2025-03-19 NOTE — PROGRESS NOTES
[facility] Admission Medication History    Information obtained from:RxQuery, patient interview       Comments/recommendations:  Patient is taking Eliquis 5 mg once daily for atrial fibrillation.  He reported that NP mentioned dose at last PCP visit, remarking that was typically given twice daily however the provider was reluctant to change it since it was originally ordered by cardiologist.  Medication was originally started as twice daily in 2/2023 following ED visit diagnosing Atrial Flutter but dose was changed to once daily when reordered by cardiologist Dr. Rodriguez in 4/2023.  Patient reported that he has been seen by Larry Mccarthy - pharmacist is contacting Dr. Mccarthy to verify Eliquis dose.      Medication changes (since last review):  Added  None  Removed  Meloxicam 15 mg daily  Guaifenesin 600 mg ER, 2 tab BID x 10 days  Adjusted  Ibuprofen dose adjusted to 200 mg tablet (OTC) from 400 mg (Rx)       [facility] reflects medications filled and processed through the patient's insurance, however                this data does NOT capture whether the medication was picked up or is currently being taken by the patient.         Patient allergies:   Allergies as of 03/19/2025 - Fully Reviewed 03/19/2025   Allergen Reaction Noted    Shellfish allergy Anaphylaxis 01/18/2019    Shellfish-derived products Anaphylaxis 04/04/2018         Prior to Admission Medications   Prescriptions Last Dose Informant   ELIQUIS 5 MG TABS tablet 3/18/2025 at  7:00 AM    Sig: Take 1 tablet by mouth daily   cyclobenzaprine (FLEXERIL) 10 MG tablet Past Month    Sig: Take 1 tablet by mouth 3 times daily as needed for Muscle spasms       hydroCHLOROthiazide (HYDRODIURIL) 25 MG tablet 3/18/2025    Sig: Take 1 tablet by mouth daily   ibuprofen (ADVIL;MOTRIN) 200 MG tablet 3/17/2025    Sig: Take 1 tablet by mouth every 6 hours as needed for Pain   lisinopril (PRINIVIL;ZESTRIL) 40 MG tablet 3/18/2025 at  7:00 AM    Sig: Take 1 tablet  by mouth daily       metoprolol tartrate (LOPRESSOR) 25 MG tablet 3/18/2025    Sig: Take 1 tablet by mouth 2 times daily      Facility-Administered Medications: None          Thank you,  Shreya Steven, [facility]

## 2025-03-19 NOTE — PLAN OF CARE
Problem: Discharge Planning  Goal: Discharge to home or other facility with appropriate resources  3/19/2025 0742 by Jessica Guerrero RN  Outcome: Progressing  3/19/2025 0648 by Nubia Guzman RN  Outcome: Progressing     Problem: Pain  Goal: Verbalizes/displays adequate comfort level or baseline comfort level  3/19/2025 0742 by Jessica Guerrero RN  Outcome: Progressing  3/19/2025 0648 by Nubia Guzman RN  Outcome: Progressing     Problem: ABCDS Injury Assessment  Goal: Absence of physical injury  3/19/2025 0742 by Jessica Guerrero RN  Outcome: Progressing  3/19/2025 0648 by Nubia Guzman RN  Outcome: Progressing     Problem: Cardiovascular - Adult  Goal: Maintains optimal cardiac output and hemodynamic stability  3/19/2025 0742 by Jessica Guerrero RN  Outcome: Progressing  3/19/2025 0648 by Nubia Guzman RN  Reactivated  Goal: Absence of cardiac dysrhythmias or at baseline  3/19/2025 0742 by Jessica Guerrero RN  Outcome: Progressing  3/19/2025 0648 by Nubia Guzman RN  Reactivated     Problem: Metabolic/Fluid and Electrolytes - Adult  Goal: Electrolytes maintained within normal limits  3/19/2025 0742 by Jessica Guerrero RN  Outcome: Progressing  3/19/2025 0648 by Nubia Guzman RN  Reactivated

## 2025-03-20 ENCOUNTER — APPOINTMENT (OUTPATIENT)
Facility: HOSPITAL | Age: 55
DRG: 308 | End: 2025-03-20
Attending: INTERNAL MEDICINE
Payer: COMMERCIAL

## 2025-03-20 ENCOUNTER — APPOINTMENT (OUTPATIENT)
Facility: HOSPITAL | Age: 55
DRG: 308 | End: 2025-03-20
Attending: FAMILY MEDICINE
Payer: COMMERCIAL

## 2025-03-20 ENCOUNTER — ANESTHESIA EVENT (OUTPATIENT)
Facility: HOSPITAL | Age: 55
End: 2025-03-20
Payer: COMMERCIAL

## 2025-03-20 ENCOUNTER — ANESTHESIA (OUTPATIENT)
Facility: HOSPITAL | Age: 55
End: 2025-03-20
Payer: COMMERCIAL

## 2025-03-20 PROBLEM — I48.91 ATRIAL FIBRILLATION WITH RVR (HCC): Status: ACTIVE | Noted: 2025-03-20

## 2025-03-20 LAB
ALBUMIN SERPL-MCNC: 3.9 G/DL (ref 3.5–5)
ALBUMIN/GLOB SERPL: 1.1 (ref 1.1–2.2)
ALP SERPL-CCNC: 59 U/L (ref 45–117)
ALT SERPL-CCNC: 28 U/L (ref 12–78)
ANION GAP SERPL CALC-SCNC: 7 MMOL/L (ref 2–12)
ANION GAP SERPL CALC-SCNC: 8 MMOL/L (ref 2–12)
AST SERPL-CCNC: 24 U/L (ref 15–37)
BASOPHILS # BLD: 0.03 K/UL (ref 0–0.1)
BASOPHILS NFR BLD: 0.4 % (ref 0–1)
BILIRUB SERPL-MCNC: 0.4 MG/DL (ref 0.2–1)
BUN SERPL-MCNC: 25 MG/DL (ref 6–20)
BUN SERPL-MCNC: 26 MG/DL (ref 6–20)
BUN/CREAT SERPL: 17 (ref 12–20)
BUN/CREAT SERPL: 19 (ref 12–20)
CALCIUM SERPL-MCNC: 9.1 MG/DL (ref 8.5–10.1)
CALCIUM SERPL-MCNC: 9.3 MG/DL (ref 8.5–10.1)
CHLORIDE SERPL-SCNC: 107 MMOL/L (ref 97–108)
CHLORIDE SERPL-SCNC: 107 MMOL/L (ref 97–108)
CO2 SERPL-SCNC: 25 MMOL/L (ref 21–32)
CO2 SERPL-SCNC: 25 MMOL/L (ref 21–32)
CREAT SERPL-MCNC: 1.32 MG/DL (ref 0.7–1.3)
CREAT SERPL-MCNC: 1.52 MG/DL (ref 0.7–1.3)
DIFFERENTIAL METHOD BLD: NORMAL
ECHO BSA: 2.47 M2
EKG ATRIAL RATE: 242 BPM
EKG ATRIAL RATE: 83 BPM
EKG DIAGNOSIS: NORMAL
EKG DIAGNOSIS: NORMAL
EKG P AXIS: 42 DEGREES
EKG P-R INTERVAL: 194 MS
EKG Q-T INTERVAL: 370 MS
EKG Q-T INTERVAL: 456 MS
EKG QRS DURATION: 110 MS
EKG QRS DURATION: 110 MS
EKG QTC CALCULATION (BAZETT): 507 MS
EKG QTC CALCULATION (BAZETT): 535 MS
EKG R AXIS: 67 DEGREES
EKG R AXIS: 76 DEGREES
EKG T AXIS: 78 DEGREES
EKG T AXIS: 92 DEGREES
EKG VENTRICULAR RATE: 113 BPM
EKG VENTRICULAR RATE: 83 BPM
EOSINOPHIL # BLD: 0.11 K/UL (ref 0–0.4)
EOSINOPHIL NFR BLD: 1.6 % (ref 0–7)
ERYTHROCYTE [DISTWIDTH] IN BLOOD BY AUTOMATED COUNT: 14.2 % (ref 11.5–14.5)
FLUAV RNA SPEC QL NAA+PROBE: NOT DETECTED
FLUBV RNA SPEC QL NAA+PROBE: NOT DETECTED
GLOBULIN SER CALC-MCNC: 3.5 G/DL (ref 2–4)
GLUCOSE SERPL-MCNC: 135 MG/DL (ref 65–100)
GLUCOSE SERPL-MCNC: 142 MG/DL (ref 65–100)
HCT VFR BLD AUTO: 41.5 % (ref 36.6–50.3)
HGB BLD-MCNC: 14.3 G/DL (ref 12.1–17)
IMM GRANULOCYTES # BLD AUTO: 0.03 K/UL (ref 0–0.04)
IMM GRANULOCYTES NFR BLD AUTO: 0.4 % (ref 0–0.5)
INR PPP: 1 (ref 0.9–1.1)
LYMPHOCYTES # BLD: 2.14 K/UL (ref 0.8–3.5)
LYMPHOCYTES NFR BLD: 30.7 % (ref 12–49)
MCH RBC QN AUTO: 27.8 PG (ref 26–34)
MCHC RBC AUTO-ENTMCNC: 34.5 G/DL (ref 30–36.5)
MCV RBC AUTO: 80.6 FL (ref 80–99)
MONOCYTES # BLD: 0.44 K/UL (ref 0–1)
MONOCYTES NFR BLD: 6.3 % (ref 5–13)
NEUTS SEG # BLD: 4.23 K/UL (ref 1.8–8)
NEUTS SEG NFR BLD: 60.6 % (ref 32–75)
NRBC # BLD: 0 K/UL (ref 0–0.01)
NRBC BLD-RTO: 0 PER 100 WBC
PLATELET # BLD AUTO: 183 K/UL (ref 150–400)
PMV BLD AUTO: 11.5 FL (ref 8.9–12.9)
POTASSIUM SERPL-SCNC: 3.8 MMOL/L (ref 3.5–5.1)
POTASSIUM SERPL-SCNC: 4.1 MMOL/L (ref 3.5–5.1)
PROT SERPL-MCNC: 7.4 G/DL (ref 6.4–8.2)
PROTHROMBIN TIME: 10.8 SEC (ref 9.2–11.2)
RBC # BLD AUTO: 5.15 M/UL (ref 4.1–5.7)
SARS-COV-2 RNA RESP QL NAA+PROBE: NOT DETECTED
SODIUM SERPL-SCNC: 139 MMOL/L (ref 136–145)
SODIUM SERPL-SCNC: 140 MMOL/L (ref 136–145)
SOURCE: NORMAL
T4 FREE SERPL-MCNC: 0.9 NG/DL (ref 0.8–1.5)
TROPONIN I SERPL HS-MCNC: 41 NG/L (ref 0–76)
TSH SERPL DL<=0.05 MIU/L-ACNC: 4 UIU/ML (ref 0.36–3.74)
WBC # BLD AUTO: 7 K/UL (ref 4.1–11.1)

## 2025-03-20 PROCEDURE — B246ZZ4 ULTRASONOGRAPHY OF RIGHT AND LEFT HEART, TRANSESOPHAGEAL: ICD-10-PCS | Performed by: INTERNAL MEDICINE

## 2025-03-20 PROCEDURE — 84439 ASSAY OF FREE THYROXINE: CPT

## 2025-03-20 PROCEDURE — 6360000002 HC RX W HCPCS: Performed by: INTERNAL MEDICINE

## 2025-03-20 PROCEDURE — 6360000002 HC RX W HCPCS: Performed by: NURSE ANESTHETIST, CERTIFIED REGISTERED

## 2025-03-20 PROCEDURE — 5A09357 ASSISTANCE WITH RESPIRATORY VENTILATION, LESS THAN 24 CONSECUTIVE HOURS, CONTINUOUS POSITIVE AIRWAY PRESSURE: ICD-10-PCS | Performed by: STUDENT IN AN ORGANIZED HEALTH CARE EDUCATION/TRAINING PROGRAM

## 2025-03-20 PROCEDURE — 2580000003 HC RX 258: Performed by: NURSE ANESTHETIST, CERTIFIED REGISTERED

## 2025-03-20 PROCEDURE — 5A2204Z RESTORATION OF CARDIAC RHYTHM, SINGLE: ICD-10-PCS | Performed by: INTERNAL MEDICINE

## 2025-03-20 PROCEDURE — 94760 N-INVAS EAR/PLS OXIMETRY 1: CPT

## 2025-03-20 PROCEDURE — 6360000002 HC RX W HCPCS: Performed by: STUDENT IN AN ORGANIZED HEALTH CARE EDUCATION/TRAINING PROGRAM

## 2025-03-20 PROCEDURE — 3700000000 HC ANESTHESIA ATTENDED CARE

## 2025-03-20 PROCEDURE — 93312 ECHO TRANSESOPHAGEAL: CPT

## 2025-03-20 PROCEDURE — 80053 COMPREHEN METABOLIC PANEL: CPT

## 2025-03-20 PROCEDURE — 93010 ELECTROCARDIOGRAM REPORT: CPT | Performed by: INTERNAL MEDICINE

## 2025-03-20 PROCEDURE — 6370000000 HC RX 637 (ALT 250 FOR IP): Performed by: INTERNAL MEDICINE

## 2025-03-20 PROCEDURE — 84484 ASSAY OF TROPONIN QUANT: CPT

## 2025-03-20 PROCEDURE — 93010 ELECTROCARDIOGRAM REPORT: CPT | Performed by: SPECIALIST

## 2025-03-20 PROCEDURE — 6360000002 HC RX W HCPCS: Performed by: FAMILY MEDICINE

## 2025-03-20 PROCEDURE — 85610 PROTHROMBIN TIME: CPT

## 2025-03-20 PROCEDURE — 85025 COMPLETE CBC W/AUTO DIFF WBC: CPT

## 2025-03-20 PROCEDURE — 3700000001 HC ADD 15 MINUTES (ANESTHESIA)

## 2025-03-20 PROCEDURE — 71045 X-RAY EXAM CHEST 1 VIEW: CPT

## 2025-03-20 PROCEDURE — 93005 ELECTROCARDIOGRAM TRACING: CPT | Performed by: INTERNAL MEDICINE

## 2025-03-20 PROCEDURE — 94660 CPAP INITIATION&MGMT: CPT

## 2025-03-20 PROCEDURE — 2700000000 HC OXYGEN THERAPY PER DAY

## 2025-03-20 PROCEDURE — 87636 SARSCOV2 & INF A&B AMP PRB: CPT

## 2025-03-20 PROCEDURE — 2060000000 HC ICU INTERMEDIATE R&B

## 2025-03-20 PROCEDURE — 2500000003 HC RX 250 WO HCPCS: Performed by: FAMILY MEDICINE

## 2025-03-20 PROCEDURE — 2500000003 HC RX 250 WO HCPCS: Performed by: INTERNAL MEDICINE

## 2025-03-20 PROCEDURE — 84443 ASSAY THYROID STIM HORMONE: CPT

## 2025-03-20 RX ORDER — ACETAMINOPHEN 325 MG/1
650 TABLET ORAL EVERY 6 HOURS PRN
Status: DISCONTINUED | OUTPATIENT
Start: 2025-03-20 | End: 2025-03-23 | Stop reason: HOSPADM

## 2025-03-20 RX ORDER — LORAZEPAM 1 MG/1
1 TABLET ORAL EVERY 4 HOURS PRN
Status: DISCONTINUED | OUTPATIENT
Start: 2025-03-20 | End: 2025-03-23 | Stop reason: HOSPADM

## 2025-03-20 RX ORDER — AMIODARONE HYDROCHLORIDE 200 MG/1
400 TABLET ORAL 2 TIMES DAILY
Status: DISCONTINUED | OUTPATIENT
Start: 2025-03-20 | End: 2025-03-23 | Stop reason: HOSPADM

## 2025-03-20 RX ORDER — POLYETHYLENE GLYCOL 3350 17 G/17G
17 POWDER, FOR SOLUTION ORAL DAILY PRN
Status: DISCONTINUED | OUTPATIENT
Start: 2025-03-20 | End: 2025-03-23 | Stop reason: HOSPADM

## 2025-03-20 RX ORDER — METOPROLOL SUCCINATE 50 MG/1
100 TABLET, EXTENDED RELEASE ORAL DAILY
Status: DISCONTINUED | OUTPATIENT
Start: 2025-03-20 | End: 2025-03-21

## 2025-03-20 RX ORDER — ONDANSETRON 4 MG/1
4 TABLET, ORALLY DISINTEGRATING ORAL EVERY 8 HOURS PRN
Status: DISCONTINUED | OUTPATIENT
Start: 2025-03-20 | End: 2025-03-23 | Stop reason: HOSPADM

## 2025-03-20 RX ORDER — PROPOFOL 10 MG/ML
INJECTION, EMULSION INTRAVENOUS
Status: DISPENSED
Start: 2025-03-20 | End: 2025-03-21

## 2025-03-20 RX ORDER — SODIUM CHLORIDE 0.9 % (FLUSH) 0.9 %
5-40 SYRINGE (ML) INJECTION EVERY 12 HOURS SCHEDULED
Status: DISCONTINUED | OUTPATIENT
Start: 2025-03-20 | End: 2025-03-23 | Stop reason: HOSPADM

## 2025-03-20 RX ORDER — SODIUM CHLORIDE 9 MG/ML
INJECTION, SOLUTION INTRAVENOUS PRN
Status: DISCONTINUED | OUTPATIENT
Start: 2025-03-20 | End: 2025-03-23 | Stop reason: HOSPADM

## 2025-03-20 RX ORDER — METOPROLOL TARTRATE 50 MG
50 TABLET ORAL 2 TIMES DAILY
Status: DISCONTINUED | OUTPATIENT
Start: 2025-03-20 | End: 2025-03-20

## 2025-03-20 RX ORDER — PROPOFOL 10 MG/ML
INJECTION, EMULSION INTRAVENOUS
Status: COMPLETED
Start: 2025-03-20 | End: 2025-03-20

## 2025-03-20 RX ORDER — LIDOCAINE HYDROCHLORIDE 20 MG/ML
INJECTION, SOLUTION EPIDURAL; INFILTRATION; INTRACAUDAL; PERINEURAL
Status: DISPENSED
Start: 2025-03-20 | End: 2025-03-21

## 2025-03-20 RX ORDER — NITROGLYCERIN 0.4 MG/1
0.4 TABLET SUBLINGUAL EVERY 5 MIN PRN
Status: DISCONTINUED | OUTPATIENT
Start: 2025-03-20 | End: 2025-03-23 | Stop reason: HOSPADM

## 2025-03-20 RX ORDER — POTASSIUM CHLORIDE 750 MG/1
40 TABLET, EXTENDED RELEASE ORAL PRN
Status: DISCONTINUED | OUTPATIENT
Start: 2025-03-20 | End: 2025-03-23 | Stop reason: HOSPADM

## 2025-03-20 RX ORDER — MORPHINE SULFATE 2 MG/ML
2 INJECTION, SOLUTION INTRAMUSCULAR; INTRAVENOUS
Refills: 0 | Status: DISCONTINUED | OUTPATIENT
Start: 2025-03-20 | End: 2025-03-23 | Stop reason: HOSPADM

## 2025-03-20 RX ORDER — NALOXONE HYDROCHLORIDE 0.4 MG/ML
0.4 INJECTION, SOLUTION INTRAMUSCULAR; INTRAVENOUS; SUBCUTANEOUS PRN
Status: DISCONTINUED | OUTPATIENT
Start: 2025-03-20 | End: 2025-03-23 | Stop reason: HOSPADM

## 2025-03-20 RX ORDER — PHENYLEPHRINE HCL IN 0.9% NACL 0.4MG/10ML
SYRINGE (ML) INTRAVENOUS
Status: DISPENSED
Start: 2025-03-20 | End: 2025-03-21

## 2025-03-20 RX ORDER — SODIUM CHLORIDE 9 MG/ML
INJECTION, SOLUTION INTRAVENOUS
Status: DISCONTINUED | OUTPATIENT
Start: 2025-03-20 | End: 2025-03-20 | Stop reason: SDUPTHER

## 2025-03-20 RX ORDER — SODIUM CHLORIDE 0.9 % (FLUSH) 0.9 %
5-40 SYRINGE (ML) INJECTION PRN
Status: DISCONTINUED | OUTPATIENT
Start: 2025-03-20 | End: 2025-03-23 | Stop reason: HOSPADM

## 2025-03-20 RX ORDER — ONDANSETRON 2 MG/ML
4 INJECTION INTRAMUSCULAR; INTRAVENOUS EVERY 6 HOURS PRN
Status: DISCONTINUED | OUTPATIENT
Start: 2025-03-20 | End: 2025-03-23 | Stop reason: HOSPADM

## 2025-03-20 RX ORDER — LISINOPRIL 10 MG/1
40 TABLET ORAL DAILY
Status: DISCONTINUED | OUTPATIENT
Start: 2025-03-20 | End: 2025-03-21

## 2025-03-20 RX ORDER — ACETAMINOPHEN 650 MG/1
650 SUPPOSITORY RECTAL EVERY 6 HOURS PRN
Status: DISCONTINUED | OUTPATIENT
Start: 2025-03-20 | End: 2025-03-23 | Stop reason: HOSPADM

## 2025-03-20 RX ORDER — POTASSIUM CHLORIDE 7.45 MG/ML
10 INJECTION INTRAVENOUS PRN
Status: DISCONTINUED | OUTPATIENT
Start: 2025-03-20 | End: 2025-03-23 | Stop reason: HOSPADM

## 2025-03-20 RX ORDER — FUROSEMIDE 10 MG/ML
40 INJECTION INTRAMUSCULAR; INTRAVENOUS ONCE
Status: COMPLETED | OUTPATIENT
Start: 2025-03-20 | End: 2025-03-20

## 2025-03-20 RX ORDER — MAGNESIUM SULFATE IN WATER 40 MG/ML
2000 INJECTION, SOLUTION INTRAVENOUS PRN
Status: DISCONTINUED | OUTPATIENT
Start: 2025-03-20 | End: 2025-03-23 | Stop reason: HOSPADM

## 2025-03-20 RX ORDER — FUROSEMIDE 10 MG/ML
20 INJECTION INTRAMUSCULAR; INTRAVENOUS DAILY
Status: DISCONTINUED | OUTPATIENT
Start: 2025-03-20 | End: 2025-03-20 | Stop reason: ALTCHOICE

## 2025-03-20 RX ADMIN — LORAZEPAM 1 MG: 1 TABLET ORAL at 21:16

## 2025-03-20 RX ADMIN — MORPHINE SULFATE 2 MG: 2 INJECTION, SOLUTION INTRAMUSCULAR; INTRAVENOUS at 01:40

## 2025-03-20 RX ADMIN — FUROSEMIDE 40 MG: 10 INJECTION, SOLUTION INTRAMUSCULAR; INTRAVENOUS at 12:07

## 2025-03-20 RX ADMIN — AMIODARONE HYDROCHLORIDE 400 MG: 200 TABLET ORAL at 21:16

## 2025-03-20 RX ADMIN — POLYETHYLENE GLYCOL 3350 17 G: 17 POWDER, FOR SOLUTION ORAL at 18:07

## 2025-03-20 RX ADMIN — APIXABAN 5 MG: 5 TABLET, FILM COATED ORAL at 21:16

## 2025-03-20 RX ADMIN — MORPHINE SULFATE 2 MG: 2 INJECTION, SOLUTION INTRAMUSCULAR; INTRAVENOUS at 10:34

## 2025-03-20 RX ADMIN — METOPROLOL SUCCINATE 100 MG: 50 TABLET, FILM COATED, EXTENDED RELEASE ORAL at 15:31

## 2025-03-20 RX ADMIN — MORPHINE SULFATE 2 MG: 2 INJECTION, SOLUTION INTRAMUSCULAR; INTRAVENOUS at 06:32

## 2025-03-20 RX ADMIN — ONDANSETRON 4 MG: 2 INJECTION, SOLUTION INTRAMUSCULAR; INTRAVENOUS at 19:18

## 2025-03-20 RX ADMIN — SODIUM CHLORIDE: 9 INJECTION, SOLUTION INTRAVENOUS at 14:01

## 2025-03-20 RX ADMIN — SODIUM CHLORIDE, PRESERVATIVE FREE 10 ML: 5 INJECTION INTRAVENOUS at 08:30

## 2025-03-20 RX ADMIN — SODIUM CHLORIDE, PRESERVATIVE FREE 10 ML: 5 INJECTION INTRAVENOUS at 21:17

## 2025-03-20 RX ADMIN — MORPHINE SULFATE 2 MG: 2 INJECTION, SOLUTION INTRAMUSCULAR; INTRAVENOUS at 18:07

## 2025-03-20 RX ADMIN — PROPOFOL 100 MG: 10 INJECTION, EMULSION INTRAVENOUS at 14:05

## 2025-03-20 RX ADMIN — SODIUM CHLORIDE, PRESERVATIVE FREE 10 ML: 5 INJECTION INTRAVENOUS at 12:08

## 2025-03-20 RX ADMIN — AMIODARONE HYDROCHLORIDE 1 MG/MIN: 1.8 INJECTION, SOLUTION INTRAVENOUS at 08:30

## 2025-03-20 ASSESSMENT — PAIN DESCRIPTION - LOCATION
LOCATION: CHEST

## 2025-03-20 ASSESSMENT — PAIN SCALES - GENERAL
PAINLEVEL_OUTOF10: 8
PAINLEVEL_OUTOF10: 7
PAINLEVEL_OUTOF10: 5
PAINLEVEL_OUTOF10: 7
PAINLEVEL_OUTOF10: 7
PAINLEVEL_OUTOF10: 3
PAINLEVEL_OUTOF10: 6

## 2025-03-20 ASSESSMENT — PAIN DESCRIPTION - DESCRIPTORS
DESCRIPTORS: CRUSHING
DESCRIPTORS: HEAVINESS;PRESSURE
DESCRIPTORS: HEAVINESS;PRESSURE
DESCRIPTORS: CRUSHING
DESCRIPTORS: PRESSURE;HEAVINESS
DESCRIPTORS: HEAVINESS;PRESSURE
DESCRIPTORS: PRESSURE;HEAVINESS

## 2025-03-20 ASSESSMENT — PAIN DESCRIPTION - ORIENTATION
ORIENTATION: MID

## 2025-03-20 ASSESSMENT — PAIN - FUNCTIONAL ASSESSMENT
PAIN_FUNCTIONAL_ASSESSMENT: ACTIVITIES ARE NOT PREVENTED
PAIN_FUNCTIONAL_ASSESSMENT: ACTIVITIES ARE NOT PREVENTED

## 2025-03-20 NOTE — H&P
History and Physical    Date of Service:  3/19/2025  Primary Care Provider: Martha Sanabria APRN - NP  Source of information: The patient and Chart review    Chief Complaint:  chest pain, shortness of breath      History of Presenting Illness:   Larry Samuel is a 54 y.o. male with past medical history of chronic HFrEF, mitral regurgitation, tricuspid regurgitation, atrial fibrillation, long-term anticoagulation on Eliquis, status post cardioversion x 2, hypertension, CAD, NELIDA (obstructive sleep apnea), alcohol use disorder presented as a direct admission/transfer from [facility] ED to [facility] with chief complaint of chest pain, shortness of breath, dyspnea on exertion, fatigue.  Symptoms reportedly started about 1 week ago.  Patient complains of substernal chest pain, nonradiating, pressure, constant, severe, rated as 8 out of 10, without specific alleviating factors, exacerbated with activity.  Patient also complains of shortness of breath with dyspnea on exertion, and fatigue.  Today, patient went to [facility] ED.  Initial 12-lead EKG at 0905 hrs showed atrial flutter, nonspecific ST/T wave changes at 98 bpm.  Repeat 12-lead EKG at 1316 hrs showed atrial flutter, ST/T wave changes in the anterior leads 113 bpm.  Chest x-ray portable showed mild pulmonary vas congestion but otherwise normal exam.  Initial abnormal labs at 0319 hrs. showed BUN 23, creatinine 1.58, GFR 52, blood glucose 135, TSH 4.43, proBNP 1474.  Repeat labs at 0535 hrs. showed BUN 22, creatinine 1.49, GFR 55, blood glucose 121.  High-sensitivity troponin levels were 44 and repeat troponin 17.  ED ordered morphine 2 mg IV x 2, Valium 5 mg IV x 1, GI cocktail 40 mL p.o. x 1, Protonix 40 mg IV x 1, Lasix 20 mg IV x 1, lorazepam 0.5 mg IV x 1, KCl 40 EQ p.o. x 1, metoprolol 5 mg IV x 2.  Patient was started on amiodarone continuous IV infusion at 1 mg/min.  Patient was seen and admitted to

## 2025-03-20 NOTE — NURSE NAVIGATOR
Heart Failure Nurse Navigator Chart Review performed. Cardiology consult pending (Dr. Rodriguez).  Full note to follow.

## 2025-03-20 NOTE — DISCHARGE INSTRUCTIONS
To access the American Heart Association's Interactive Workbook \"Healthier Living with Heart Failure - Managing Symptoms and Reducing Risk\"  Scan the QR code below.                              Discharge Instructions       PATIENT ID: Larry Samuel  MRN: [id number]   YOB: 1970    DATE OF ADMISSION: 3/19/2025   DATE OF DISCHARGE: 3/23/2025    PRIMARY CARE PROVIDER: Martha Sanabria     ATTENDING PHYSICIAN: Elsa Durant MD   DISCHARGING PROVIDER: Elsa Durant MD    To contact this individual call [phone] and ask the  to page.   If unavailable ask to be transferred the Adult Hospitalist Department.    DISCHARGE DIAGNOSES ***    CONSULTATIONS: [unfilled]    PROCEDURES/SURGERIES: * No surgery found *      PENDING TEST RESULTS:   At the time of discharge the following test results are still pending: None    FOLLOW UP APPOINTMENTS:    Follow-up Information       Follow up With Specialties Details Why Contact Joe Pratt MD Cardiovascular Disease Follow up You need to call [facility] Business office at  prior to scheduling a follow up appointment. [address] [address] [phone]      Martha Sanabria, APRN - NP Internal Medicine, Nurse Practitioner, Family Follow up Hospital follow-up PCP transitional care appointment has been scheduled   for Wednesday, April 2nd, 2025 at 9:00 a.m. [address] [address] [phone]                ADDITIONAL CARE RECOMMENDATIONS: You were admitted with atrial flutter with RVR that improved after a cardioversion. Your medications were adjusted to optimize your heart function. You developed pulmonary edema (fluid on the lungs) as a result of being in atrial flutter which was slowly removed with a medication called lasix (a diuretic). You will take lasix twice per day at home for four days, then resume once daily dosing. Please follow-up with Dr Rodriguez in 2 weeks for hospital follow-up.

## 2025-03-20 NOTE — ANESTHESIA POSTPROCEDURE EVALUATION
Post-Anesthesia Evaluation and Assessment    Patient: Larry Samuel MRN: [id number]  SSN: [id number]    YOB: 1970  Age: 54 y.o.  Sex: male      I have evaluated the patient and they are stable and ready for discharge from the PACU.     Cardiovascular Function/Vital Signs  Visit Vitals  /75   Pulse 82   Temp 97.9 °F (36.6 °C) (Oral)   Resp 19   Ht 1.88 m (6' 2\")   Wt 116.6 kg (257 lb)   SpO2 91%   BMI 33.00 kg/m²       Patient is status post * No anesthesia type entered * anesthesia for * No procedures listed *.    Nausea/Vomiting: None    Postoperative hydration reviewed and adequate.    Pain:      Managed    Neurological Status:       At baseline    Mental Status, Level of Consciousness: Alert and  oriented to person, place, and time    Pulmonary Status:       Adequate oxygenation and airway patent    Complications related to anesthesia: None    Post-anesthesia assessment completed. No concerns    Signed By: Edilberto Gilliam MD     March 20, 2025

## 2025-03-20 NOTE — CARE COORDINATION
03/20/25 0929   Readmission Assessment   Number of Days since last admission? 1-7 days  (transfer from [facility])   Previous Disposition Other (comment)  (transfer from [facility])   Who is being Interviewed Unable to Complete  (medical record reviewed)   What was the patient's/caregiver's perception as to why they think they needed to return back to the hospital? Other (Comment)  (transfer from [facility])   Did you visit your Primary Care Physician after you left the hospital, before you returned this time? No   Why weren't you able to visit your PCP? Other (Comment)  (transfer from [facility])   Did you see a specialist, such as Cardiac, Pulmonary, Orthopedic Physician, etc. after you left the hospital? No   Who advised the patient to return to the hospital? Other (Comment)  (transfer from [facility])   Does the patient report anything that got in the way of taking their medications? No   In our efforts to provide the best possible care to you and others like you, can you think of anything that we could have done to help you after you left the hospital the first time, so that you might not have needed to return so soon? Other (Comment)  (transfer from [facility])     DWAINE Howard

## 2025-03-20 NOTE — CARE COORDINATION
Care Management Initial Assessment       RUR: 9%  Readmission? Yes - transfer from [facility]  1st IM letter given? No  1st  letter given: No       03/20/25 0921   Service Assessment   Patient Orientation Alert and Oriented   Cognition Alert   History Provided By Medical Record   Primary Caregiver Self   Support Systems Spouse/Significant Other   Patient's Healthcare Decision Maker is: Patient Declined (Legal Next of Kin Remains as Decision Maker)   PCP Verified by CM Yes   Last Visit to PCP Within last 3 months   Prior Functional Level Independent in ADLs/IADLs   Current Functional Level Independent in ADLs/IADLs   Can patient return to prior living arrangement Yes   Ability to make needs known: Good   Family able to assist with home care needs: Yes   Would you like for me to discuss the discharge plan with any other family members/significant others, and if so, who? No   Financial Resources None   Social/Functional History   Lives With Spouse   Type of Home House   Home Layout One level   Home Equipment None   Receives Help From Family   Prior Level of Assist for ADLs Independent   Prior Level of Assist for Homemaking Independent   Homemaking Responsibilities No   Ambulation Assistance Independent   Prior Level of Assist for Transfers Independent   Active  Yes   Mode of Transportation Car   Occupation Full time employment   Type of Occupation [employer]   Discharge Planning   Type of Residence House   Living Arrangements Spouse/Significant Other   Current Services Prior To Admission None   Potential Assistance Needed N/A   DME Ordered? No   Potential Assistance Purchasing Medications No   Type of Home Care Services None   Patient expects to be discharged to: [place]     This CM reviewed assessment done by [facility] CM on afternoon of 3/19. Pt transferred to [facility] for possible TTE and cardioversion. Per previous assessment, pt is independent in ADLs, works full time and will need a work note upon d/c. Pt lives at home with

## 2025-03-20 NOTE — CONSULTS
Cardiology Consult Note    CC: Palpitation/SOB  Reason for consult:  PAF  Requesting MD:  Martha Sanabria, YAMILKA - NP     Subjective:      Date of  Admission: 3/19/2025  8:15 PM     Admission type:Urgent    Larry Samuel is a 54 y.o. male admitted for Atrial fibrillation with RVR (HCC) [I48.91]  Heart failure (HCC) [I50.9]  Atrial flutter with rapid ventricular response (HCC) [I48.92].Patient complains of worse COLMENARES/SOB/palpitation over last three days. He skipped his Eliquis two weeks ago as he was working in [place] for his job and he did not bring his medications. He has some orthopnea but no ALLIE or CP. He has HFrEF, cardiomyopathy with EF 25%, moderate to severe MR, CAD. He has had DCCV in past without difficulty.     Patient Active Problem List    Diagnosis Date Noted    Atrial fibrillation with RVR (HCC) 03/20/2025    Rapid atrial fibrillation (HCC) 03/19/2025    Atrial flutter with rapid ventricular response (HCC) 03/19/2025    A-fib (HCC) 02/20/2023    NELIDA (obstructive sleep apnea) 02/20/2023    HTN (hypertension) 02/20/2023    Diastolic heart failure (HCC) 02/07/2023    Atrial flutter (HCC) 02/06/2023      Martha Sanabria, YAMILKA - NP  Past Medical History:   Diagnosis Date    Hypertension       No past surgical history on file.  Allergies   Allergen Reactions    Shellfish Allergy Anaphylaxis    Shellfish-Derived Products Anaphylaxis      No family history on file.   Current Facility-Administered Medications   Medication Dose Route Frequency    furosemide (LASIX) injection 40 mg  40 mg IntraVENous Once    0.9 % sodium chloride infusion   IntraVENous PRN    acetaminophen (TYLENOL) tablet 650 mg  650 mg Oral Q6H PRN    Or    acetaminophen (TYLENOL) suppository 650 mg  650 mg Rectal Q6H PRN    amiodarone (NEXTERONE) 360 mg in dextrose 5% 200 ml  0.5 mg/min IntraVENous Continuous    apixaban (ELIQUIS) tablet 5 mg  5 mg Oral BID    furosemide (LASIX) injection 20 mg  20 mg IntraVENous Daily    lisinopril

## 2025-03-20 NOTE — ANESTHESIA PRE PROCEDURE
Cardiovascular:  Exercise tolerance: no interval change  (+) hypertension:, valvular problems/murmurs:, dysrhythmias: atrial fibrillation               ROS comment: MR mild to moderate    EF 25%       Neuro/Psych:   Negative Neuro/Psych ROS              GI/Hepatic/Renal: Neg GI/Hepatic/Renal ROS            Endo/Other: Negative Endo/Other ROS                    Abdominal: normal exam            Vascular: negative vascular ROS.         Other Findings:             Anesthesia Plan      MAC     ASA 3       Induction: intravenous.      Anesthetic plan and risks discussed with patient.      Plan discussed with CRNA.    Attending anesthesiologist reviewed and agrees with Preprocedure content                Edilberto Gilliam MD   3/20/2025

## 2025-03-21 ENCOUNTER — APPOINTMENT (OUTPATIENT)
Facility: HOSPITAL | Age: 55
DRG: 308 | End: 2025-03-21
Attending: STUDENT IN AN ORGANIZED HEALTH CARE EDUCATION/TRAINING PROGRAM
Payer: COMMERCIAL

## 2025-03-21 LAB
ALBUMIN SERPL-MCNC: 4.1 G/DL (ref 3.5–5)
ALBUMIN/GLOB SERPL: 1.2 (ref 1.1–2.2)
ALP SERPL-CCNC: 64 U/L (ref 45–117)
ALT SERPL-CCNC: 24 U/L (ref 12–78)
ANION GAP SERPL CALC-SCNC: 8 MMOL/L (ref 2–12)
AST SERPL-CCNC: 18 U/L (ref 15–37)
BILIRUB DIRECT SERPL-MCNC: 0.3 MG/DL (ref 0–0.2)
BILIRUB SERPL-MCNC: 1.7 MG/DL (ref 0.2–1)
BUN SERPL-MCNC: 29 MG/DL (ref 6–20)
BUN/CREAT SERPL: 17 (ref 12–20)
CALCIUM SERPL-MCNC: 9.1 MG/DL (ref 8.5–10.1)
CHLORIDE SERPL-SCNC: 103 MMOL/L (ref 97–108)
CO2 SERPL-SCNC: 26 MMOL/L (ref 21–32)
COMMENT:: NORMAL
CREAT SERPL-MCNC: 1.68 MG/DL (ref 0.7–1.3)
ECHO BSA: 2.47 M2
ECHO LV EF PHYSICIAN: 30 %
ECHO LV FRACTIONAL SHORTENING: 17 % (ref 28–44)
ECHO LV INTERNAL DIMENSION DIASTOLE INDEX: 2.2 CM/M2
ECHO LV INTERNAL DIMENSION DIASTOLIC: 5.3 CM (ref 4.2–5.9)
ECHO LV INTERNAL DIMENSION SYSTOLIC INDEX: 1.83 CM/M2
ECHO LV INTERNAL DIMENSION SYSTOLIC: 4.4 CM
ECHO LV IVSD: 1.5 CM (ref 0.6–1)
ECHO LV MASS 2D: 352.5 G (ref 88–224)
ECHO LV MASS INDEX 2D: 146.2 G/M2 (ref 49–115)
ECHO LV POSTERIOR WALL DIASTOLIC: 1.5 CM (ref 0.6–1)
ECHO LV RELATIVE WALL THICKNESS RATIO: 0.57
ECHO PULMONARY ARTERY END DIASTOLIC PRESSURE: 11 MMHG
ECHO PV REGURGITANT MAX VELOCITY: 1.6 M/S
ECHO TV REGURGITANT MAX VELOCITY: 2.86 M/S
ECHO TV REGURGITANT PEAK GRADIENT: 33 MMHG
EKG ATRIAL RATE: 242 BPM
EKG DIAGNOSIS: NORMAL
EKG P AXIS: 94 DEGREES
EKG Q-T INTERVAL: 310 MS
EKG QRS DURATION: 112 MS
EKG QTC CALCULATION (BAZETT): 440 MS
EKG R AXIS: 76 DEGREES
EKG T AXIS: 99 DEGREES
EKG VENTRICULAR RATE: 121 BPM
FERRITIN SERPL-MCNC: 107 NG/ML (ref 26–388)
GLOBULIN SER CALC-MCNC: 3.5 G/DL (ref 2–4)
GLUCOSE SERPL-MCNC: 107 MG/DL (ref 65–100)
IRON SATN MFR SERPL: 10 % (ref 20–50)
IRON SERPL-MCNC: 38 UG/DL (ref 35–150)
MAGNESIUM SERPL-MCNC: 2.4 MG/DL (ref 1.6–2.4)
NT PRO BNP: 844 PG/ML
POTASSIUM SERPL-SCNC: 4 MMOL/L (ref 3.5–5.1)
PROT SERPL-MCNC: 7.6 G/DL (ref 6.4–8.2)
SODIUM SERPL-SCNC: 137 MMOL/L (ref 136–145)
SPECIMEN HOLD: NORMAL
TIBC SERPL-MCNC: 385 UG/DL (ref 250–450)

## 2025-03-21 PROCEDURE — 94760 N-INVAS EAR/PLS OXIMETRY 1: CPT

## 2025-03-21 PROCEDURE — 2060000000 HC ICU INTERMEDIATE R&B

## 2025-03-21 PROCEDURE — 80076 HEPATIC FUNCTION PANEL: CPT

## 2025-03-21 PROCEDURE — 94660 CPAP INITIATION&MGMT: CPT

## 2025-03-21 PROCEDURE — C8924 2D TTE W OR W/O FOL W/CON,FU: HCPCS

## 2025-03-21 PROCEDURE — 83550 IRON BINDING TEST: CPT

## 2025-03-21 PROCEDURE — 83540 ASSAY OF IRON: CPT

## 2025-03-21 PROCEDURE — 6370000000 HC RX 637 (ALT 250 FOR IP): Performed by: STUDENT IN AN ORGANIZED HEALTH CARE EDUCATION/TRAINING PROGRAM

## 2025-03-21 PROCEDURE — 83880 ASSAY OF NATRIURETIC PEPTIDE: CPT

## 2025-03-21 PROCEDURE — 6370000000 HC RX 637 (ALT 250 FOR IP): Performed by: INTERNAL MEDICINE

## 2025-03-21 PROCEDURE — 2500000003 HC RX 250 WO HCPCS: Performed by: INTERNAL MEDICINE

## 2025-03-21 PROCEDURE — 83735 ASSAY OF MAGNESIUM: CPT

## 2025-03-21 PROCEDURE — 2500000003 HC RX 250 WO HCPCS: Performed by: FAMILY MEDICINE

## 2025-03-21 PROCEDURE — 6360000004 HC RX CONTRAST MEDICATION: Performed by: STUDENT IN AN ORGANIZED HEALTH CARE EDUCATION/TRAINING PROGRAM

## 2025-03-21 PROCEDURE — 82728 ASSAY OF FERRITIN: CPT

## 2025-03-21 PROCEDURE — 80048 BASIC METABOLIC PNL TOTAL CA: CPT

## 2025-03-21 PROCEDURE — 6360000002 HC RX W HCPCS: Performed by: STUDENT IN AN ORGANIZED HEALTH CARE EDUCATION/TRAINING PROGRAM

## 2025-03-21 RX ORDER — METOPROLOL SUCCINATE 50 MG/1
50 TABLET, EXTENDED RELEASE ORAL DAILY
Status: DISCONTINUED | OUTPATIENT
Start: 2025-03-21 | End: 2025-03-21

## 2025-03-21 RX ORDER — SPIRONOLACTONE 25 MG/1
25 TABLET ORAL DAILY
Status: DISCONTINUED | OUTPATIENT
Start: 2025-03-21 | End: 2025-03-23 | Stop reason: HOSPADM

## 2025-03-21 RX ORDER — METOPROLOL SUCCINATE 50 MG/1
100 TABLET, EXTENDED RELEASE ORAL DAILY
Status: DISCONTINUED | OUTPATIENT
Start: 2025-03-21 | End: 2025-03-21

## 2025-03-21 RX ORDER — LISINOPRIL 10 MG/1
20 TABLET ORAL DAILY
Status: DISCONTINUED | OUTPATIENT
Start: 2025-03-21 | End: 2025-03-21

## 2025-03-21 RX ORDER — METOPROLOL SUCCINATE 50 MG/1
50 TABLET, EXTENDED RELEASE ORAL DAILY
Status: DISCONTINUED | OUTPATIENT
Start: 2025-03-22 | End: 2025-03-23 | Stop reason: HOSPADM

## 2025-03-21 RX ORDER — LOSARTAN POTASSIUM 50 MG/1
25 TABLET ORAL DAILY
Status: DISCONTINUED | OUTPATIENT
Start: 2025-03-22 | End: 2025-03-23 | Stop reason: HOSPADM

## 2025-03-21 RX ORDER — FUROSEMIDE 10 MG/ML
40 INJECTION INTRAMUSCULAR; INTRAVENOUS ONCE
Status: COMPLETED | OUTPATIENT
Start: 2025-03-21 | End: 2025-03-21

## 2025-03-21 RX ADMIN — SODIUM CHLORIDE, PRESERVATIVE FREE 10 ML: 5 INJECTION INTRAVENOUS at 21:06

## 2025-03-21 RX ADMIN — ACETAMINOPHEN 650 MG: 325 TABLET ORAL at 21:04

## 2025-03-21 RX ADMIN — LISINOPRIL 20 MG: 10 TABLET ORAL at 08:19

## 2025-03-21 RX ADMIN — SODIUM CHLORIDE, PRESERVATIVE FREE 10 ML: 5 INJECTION INTRAVENOUS at 08:20

## 2025-03-21 RX ADMIN — FUROSEMIDE 40 MG: 10 INJECTION, SOLUTION INTRAMUSCULAR; INTRAVENOUS at 08:20

## 2025-03-21 RX ADMIN — AMIODARONE HYDROCHLORIDE 400 MG: 200 TABLET ORAL at 21:05

## 2025-03-21 RX ADMIN — ACETAMINOPHEN 650 MG: 325 TABLET ORAL at 02:05

## 2025-03-21 RX ADMIN — METOPROLOL SUCCINATE 100 MG: 50 TABLET, FILM COATED, EXTENDED RELEASE ORAL at 08:20

## 2025-03-21 RX ADMIN — SULFUR HEXAFLUORIDE 2 ML: KIT at 11:40

## 2025-03-21 RX ADMIN — APIXABAN 5 MG: 5 TABLET, FILM COATED ORAL at 21:05

## 2025-03-21 RX ADMIN — AMIODARONE HYDROCHLORIDE 400 MG: 200 TABLET ORAL at 08:19

## 2025-03-21 RX ADMIN — APIXABAN 5 MG: 5 TABLET, FILM COATED ORAL at 08:19

## 2025-03-21 ASSESSMENT — ENCOUNTER SYMPTOMS
CHEST TIGHTNESS: 1
ABDOMINAL PAIN: 0
RHINORRHEA: 0
VOMITING: 0
NAUSEA: 0
SORE THROAT: 0
COUGH: 1
DIARRHEA: 0
SHORTNESS OF BREATH: 1
EYE PAIN: 0

## 2025-03-21 ASSESSMENT — PAIN DESCRIPTION - DESCRIPTORS: DESCRIPTORS: ACHING

## 2025-03-21 ASSESSMENT — PAIN SCALES - GENERAL
PAINLEVEL_OUTOF10: 3
PAINLEVEL_OUTOF10: 3
PAINLEVEL_OUTOF10: 0
PAINLEVEL_OUTOF10: 2

## 2025-03-21 ASSESSMENT — PAIN DESCRIPTION - ORIENTATION: ORIENTATION: ANTERIOR

## 2025-03-21 ASSESSMENT — PAIN DESCRIPTION - LOCATION: LOCATION: HAND

## 2025-03-21 NOTE — LETTER
[employer] Next, an artificial intelligence clinical decision support software, has identified that Your patient, POONAM ZIEGLER (1970), meets the criteria for Moderate to Severe Mitral Regurgitation based on the echo performed on 03/19/2025. Per* ACC/AHA's Valvular Heart disease guidelines, an intervention may be indicated. A referral requires a multifactorial decision outside the purview of the algorithm. For determining appropriate referral, please evaluate the patientâ€™s record. If you find a referral is necessary and the patient does not already have a cardiologist, Meseret Clement, the [facility] Structural Heart Valve Coordinator will be happy to assist your office in connecting this patient with the [facility] multidisciplinary heart team for further evaluation. To do so, please either send an Freebeepay referral to Dr. Javier Krueger, an EPIC message to Meseret Clement or you can call [phone]. We appreciate your partnership in providing our patients the best cardiac care possible. You are receiving this notification as part of a quality initiative using the power of artificial intelligence and the electronic health record to improve patient outcomes. Note: It is the clinicianâ€™s decision and ultimate responsibility whether to not refer if clinically indicated to do so. [employer] Next is intended for use by a qualified healthcare professional.

## 2025-03-21 NOTE — CARE COORDINATION
Transition of Care Plan:    Cardiology, HF following. Noted: Cardiology plan for JOSELINE/DCCV 2/20/25. The patient is from home, independent, lives with his wife. Patient was transferred from [facility] for treatment and plans to discharge home with wife to [facility] when stable for discharge. CM following for discharge needs.      RUR: 7% Low  Prior Level of Functioning: Indpependent  Disposition:   MARIAH:   Follow up appointments: Per attending's recommendations  DME needed: None  Transportation at discharge: Family   IM/IMM Medicare/ letter given: No. [employer] insurance   Is patient a  and connected with [facility]? No   If yes, was  transfer form completed and [facility] notified?   Caregiver Contact: Spouse:  Juliano Ramon: [phone]  Discharge Caregiver contacted prior to discharge? Per patient's preference  Care Conference needed? No   Barriers to discharge: Medical stability    Charity Grace RN/CRM  [phone]      T

## 2025-03-21 NOTE — NURSE NAVIGATOR
Attempted to schedule patient with a [facility] follow up.  Patient needs to call business office prior to scheduling.  Information placed on After Visit Summary.  Requested care management specialist schedule PCP follow up.

## 2025-03-21 NOTE — NURSE NAVIGATOR
Heart Failure Nurse Navigator rounds completed.    HF NN provided introduction to self and nurse navigator role. AHA Discharge Packet for Patients Diagnosed with Heart Failure booklet given to patient, along with weight calendar, signs/symptoms magnet, and card with QR codes for HF video resources.       Reviewed importance of daily weights and following low sodium diet.  Patient states that he does perform daily weights \"most of the time\". Encouraged patient to develop daily habit for this and explained rationale to patient.  Patient states he does not add salt to his foods and does not particularly like the taste of salt.  Encouraged patient to read nutrition labels to be aware of sodium content in foods and to stay under 2000 mg sodium per day.    Patient's primary cardiologist is Dr. Rodriguez. Patient thinks he may have an appointment already scheduled. HF NN will check.     Advised patient that follow up appointment will be scheduled and placed on Discharge Instructions prior to discharge. Patient provided with contact information for HF NN and encouraged to call with questions. Will continue to follow until discharge.        Time spent with patient discussing HF education:  15 minutes

## 2025-03-22 ENCOUNTER — APPOINTMENT (OUTPATIENT)
Facility: HOSPITAL | Age: 55
DRG: 308 | End: 2025-03-22
Attending: FAMILY MEDICINE
Payer: COMMERCIAL

## 2025-03-22 LAB
ANION GAP SERPL CALC-SCNC: 5 MMOL/L (ref 2–12)
BUN SERPL-MCNC: 31 MG/DL (ref 6–20)
BUN/CREAT SERPL: 22 (ref 12–20)
CALCIUM SERPL-MCNC: 8.7 MG/DL (ref 8.5–10.1)
CHLORIDE SERPL-SCNC: 104 MMOL/L (ref 97–108)
CO2 SERPL-SCNC: 29 MMOL/L (ref 21–32)
COMMENT:: NORMAL
CREAT SERPL-MCNC: 1.4 MG/DL (ref 0.7–1.3)
GLUCOSE SERPL-MCNC: 110 MG/DL (ref 65–100)
MAGNESIUM SERPL-MCNC: 2.5 MG/DL (ref 1.6–2.4)
POTASSIUM SERPL-SCNC: 3.7 MMOL/L (ref 3.5–5.1)
SODIUM SERPL-SCNC: 138 MMOL/L (ref 136–145)
SPECIMEN HOLD: NORMAL

## 2025-03-22 PROCEDURE — 6370000000 HC RX 637 (ALT 250 FOR IP): Performed by: INTERNAL MEDICINE

## 2025-03-22 PROCEDURE — 2500000003 HC RX 250 WO HCPCS: Performed by: FAMILY MEDICINE

## 2025-03-22 PROCEDURE — 80048 BASIC METABOLIC PNL TOTAL CA: CPT

## 2025-03-22 PROCEDURE — 6360000002 HC RX W HCPCS: Performed by: INTERNAL MEDICINE

## 2025-03-22 PROCEDURE — 71045 X-RAY EXAM CHEST 1 VIEW: CPT

## 2025-03-22 PROCEDURE — 71250 CT THORAX DX C-: CPT

## 2025-03-22 PROCEDURE — 6360000002 HC RX W HCPCS: Performed by: STUDENT IN AN ORGANIZED HEALTH CARE EDUCATION/TRAINING PROGRAM

## 2025-03-22 PROCEDURE — 6370000000 HC RX 637 (ALT 250 FOR IP): Performed by: STUDENT IN AN ORGANIZED HEALTH CARE EDUCATION/TRAINING PROGRAM

## 2025-03-22 PROCEDURE — 2500000003 HC RX 250 WO HCPCS: Performed by: INTERNAL MEDICINE

## 2025-03-22 PROCEDURE — 83735 ASSAY OF MAGNESIUM: CPT

## 2025-03-22 PROCEDURE — 2060000000 HC ICU INTERMEDIATE R&B

## 2025-03-22 RX ORDER — IPRATROPIUM BROMIDE AND ALBUTEROL SULFATE 2.5; .5 MG/3ML; MG/3ML
1 SOLUTION RESPIRATORY (INHALATION) ONCE
Status: COMPLETED | OUTPATIENT
Start: 2025-03-22 | End: 2025-03-22

## 2025-03-22 RX ORDER — FUROSEMIDE 10 MG/ML
40 INJECTION INTRAMUSCULAR; INTRAVENOUS ONCE
Status: COMPLETED | OUTPATIENT
Start: 2025-03-22 | End: 2025-03-22

## 2025-03-22 RX ORDER — FUROSEMIDE 10 MG/ML
40 INJECTION INTRAMUSCULAR; INTRAVENOUS 2 TIMES DAILY
Status: DISCONTINUED | OUTPATIENT
Start: 2025-03-23 | End: 2025-03-23

## 2025-03-22 RX ORDER — IPRATROPIUM BROMIDE AND ALBUTEROL SULFATE 2.5; .5 MG/3ML; MG/3ML
1 SOLUTION RESPIRATORY (INHALATION) EVERY 4 HOURS PRN
Status: DISCONTINUED | OUTPATIENT
Start: 2025-03-22 | End: 2025-03-23 | Stop reason: HOSPADM

## 2025-03-22 RX ADMIN — SODIUM CHLORIDE, PRESERVATIVE FREE 5 ML: 5 INJECTION INTRAVENOUS at 08:40

## 2025-03-22 RX ADMIN — MORPHINE SULFATE 2 MG: 2 INJECTION, SOLUTION INTRAMUSCULAR; INTRAVENOUS at 20:21

## 2025-03-22 RX ADMIN — SPIRONOLACTONE 25 MG: 25 TABLET ORAL at 08:37

## 2025-03-22 RX ADMIN — APIXABAN 5 MG: 5 TABLET, FILM COATED ORAL at 08:37

## 2025-03-22 RX ADMIN — SODIUM CHLORIDE, PRESERVATIVE FREE 10 ML: 5 INJECTION INTRAVENOUS at 08:39

## 2025-03-22 RX ADMIN — FUROSEMIDE 40 MG: 10 INJECTION, SOLUTION INTRAMUSCULAR; INTRAVENOUS at 16:09

## 2025-03-22 RX ADMIN — ONDANSETRON 4 MG: 2 INJECTION, SOLUTION INTRAMUSCULAR; INTRAVENOUS at 05:25

## 2025-03-22 RX ADMIN — MORPHINE SULFATE 2 MG: 2 INJECTION, SOLUTION INTRAMUSCULAR; INTRAVENOUS at 05:25

## 2025-03-22 RX ADMIN — MORPHINE SULFATE 2 MG: 2 INJECTION, SOLUTION INTRAMUSCULAR; INTRAVENOUS at 13:55

## 2025-03-22 RX ADMIN — ONDANSETRON 4 MG: 2 INJECTION, SOLUTION INTRAMUSCULAR; INTRAVENOUS at 20:12

## 2025-03-22 RX ADMIN — SODIUM CHLORIDE, PRESERVATIVE FREE 10 ML: 5 INJECTION INTRAVENOUS at 20:16

## 2025-03-22 RX ADMIN — APIXABAN 5 MG: 5 TABLET, FILM COATED ORAL at 20:15

## 2025-03-22 RX ADMIN — ONDANSETRON 4 MG: 2 INJECTION, SOLUTION INTRAMUSCULAR; INTRAVENOUS at 13:55

## 2025-03-22 RX ADMIN — AMIODARONE HYDROCHLORIDE 400 MG: 200 TABLET ORAL at 08:36

## 2025-03-22 RX ADMIN — METOPROLOL SUCCINATE 50 MG: 50 TABLET, FILM COATED, EXTENDED RELEASE ORAL at 08:38

## 2025-03-22 RX ADMIN — FUROSEMIDE 40 MG: 10 INJECTION, SOLUTION INTRAMUSCULAR; INTRAVENOUS at 08:38

## 2025-03-22 RX ADMIN — AMIODARONE HYDROCHLORIDE 400 MG: 200 TABLET ORAL at 20:15

## 2025-03-22 RX ADMIN — LOSARTAN POTASSIUM 25 MG: 50 TABLET, FILM COATED ORAL at 08:36

## 2025-03-22 RX ADMIN — IPRATROPIUM BROMIDE AND ALBUTEROL SULFATE 1 DOSE: .5; 3 SOLUTION RESPIRATORY (INHALATION) at 16:09

## 2025-03-22 ASSESSMENT — PAIN DESCRIPTION - ORIENTATION
ORIENTATION: MID;LEFT;RIGHT
ORIENTATION: ANTERIOR

## 2025-03-22 ASSESSMENT — PAIN SCALES - GENERAL
PAINLEVEL_OUTOF10: 3
PAINLEVEL_OUTOF10: 0
PAINLEVEL_OUTOF10: 7

## 2025-03-22 ASSESSMENT — PAIN DESCRIPTION - DESCRIPTORS
DESCRIPTORS: ACHING;DISCOMFORT
DESCRIPTORS: ACHING
DESCRIPTORS: ACHING

## 2025-03-22 ASSESSMENT — PAIN DESCRIPTION - LOCATION
LOCATION: HEAD

## 2025-03-22 NOTE — PLAN OF CARE
Problem: Discharge Planning  Goal: Discharge to home or other facility with appropriate resources  3/21/2025 2140 by Pepe Casas RN  Outcome: Progressing  Flowsheets (Taken 3/21/2025 2105)  Discharge to home or other facility with appropriate resources: Identify barriers to discharge with patient and caregiver  3/21/2025 1302 by Nori Schwartz RN  Outcome: Progressing     Problem: Safety - Adult  Goal: Free from fall injury  3/21/2025 2140 by Pepe Casas RN  Outcome: Progressing  3/21/2025 1302 by Nori Schwartz RN  Outcome: Progressing     Problem: Pain  Goal: Verbalizes/displays adequate comfort level or baseline comfort level  3/21/2025 2140 by Pepe Casas RN  Outcome: Progressing  Flowsheets (Taken 3/21/2025 2105)  Verbalizes/displays adequate comfort level or baseline comfort level: Assess pain using appropriate pain scale  3/21/2025 1302 by Nori Schwartz RN  Outcome: Progressing     Problem: ABCDS Injury Assessment  Goal: Absence of physical injury  3/21/2025 2140 by Pepe Casas RN  Outcome: Progressing  3/21/2025 1302 by Nori Schwartz RN  Outcome: Progressing

## 2025-03-22 NOTE — DISCHARGE SUMMARY
Discharge Summary       PATIENT ID: Larry Samuel  MRN: [id number]   YOB: 1970    DATE OF ADMISSION: 3/19/2025  8:15 PM    DATE OF DISCHARGE: 03/23/25    PRIMARY CARE PROVIDER: Martha Sanabria APRN - NP     ATTENDING PHYSICIAN: Elsa Durant MD   DISCHARGING PROVIDER: Elsa Durant MD    To contact this individual call [phone] and ask the  to page.  If unavailable ask to be transferred the Adult Hospitalist Department.    CONSULTATIONS: IP CONSULT TO HEART FAILURE NURSE/COORDINATOR    PROCEDURES/SURGERIES: * No surgery found *     ADMITTING DIAGNOSES & HOSPITAL COURSE:   Larry Samuel is a 54 y.o. male who presented with shortness of breath and was found to be in atrial flutter with RVR. He underwent JOSELINE/DCCV on 3/20 with Cardiology with return to normal sinus rhythm. His cardiac medications were titrated after cardioversion and he was successfully started on GDMT. He required oxygen supplementation during admission with pulmonary edema on lung imaging; he was diuresed with IV lasix and was weaned to room air on the day of discharge. Patient will take BID dosing of lasix for 4 days after discharge and then resume once daily dosing. Follow-up with Dr Rodriguez in clinic in 2-3 weeks for reassessment.       DISCHARGE DIAGNOSES / PLAN:      Atrial flutter with rapid ventricular response (HCC), resolved, now in NSR   -s/p successful JOSELINE/DCCV 3/20   - s/p amio gtt, transitioned to po, will decrease to amio 200mg BID at time of discharge   - Eliquis 5mg BID indefinitely   - Follow-up outpatient with Cardiology      Acute chest pain, resolved   -Possibly 2/2 to above      Acute respiratory failure with hypoxia  SOB (shortness of breath) on exertion  Pulmonary edema   - Required 2-4L oxygen supplementation; now maintaining saturations on room air   - s/p IV lasix; will transition to lasix 40mg po daily at discharge   - Increase lasix to 40mg BID for 4 days after discharge and then resume once

## 2025-03-23 VITALS
RESPIRATION RATE: 20 BRPM | HEIGHT: 74 IN | BODY MASS INDEX: 35.62 KG/M2 | SYSTOLIC BLOOD PRESSURE: 125 MMHG | WEIGHT: 277.56 LBS | TEMPERATURE: 98.4 F | OXYGEN SATURATION: 100 % | DIASTOLIC BLOOD PRESSURE: 86 MMHG | HEART RATE: 74 BPM

## 2025-03-23 PROBLEM — I48.91 ATRIAL FIBRILLATION WITH RVR (HCC): Status: RESOLVED | Noted: 2025-03-20 | Resolved: 2025-03-23

## 2025-03-23 PROBLEM — I48.92 ATRIAL FLUTTER WITH RAPID VENTRICULAR RESPONSE (HCC): Status: RESOLVED | Noted: 2025-03-19 | Resolved: 2025-03-23

## 2025-03-23 LAB
ANION GAP SERPL CALC-SCNC: 9 MMOL/L (ref 2–12)
BUN SERPL-MCNC: 27 MG/DL (ref 6–20)
BUN/CREAT SERPL: 19 (ref 12–20)
CALCIUM SERPL-MCNC: 8.7 MG/DL (ref 8.5–10.1)
CHLORIDE SERPL-SCNC: 105 MMOL/L (ref 97–108)
CO2 SERPL-SCNC: 26 MMOL/L (ref 21–32)
COMMENT:: NORMAL
CREAT SERPL-MCNC: 1.41 MG/DL (ref 0.7–1.3)
GLUCOSE SERPL-MCNC: 127 MG/DL (ref 65–100)
MAGNESIUM SERPL-MCNC: 2.1 MG/DL (ref 1.6–2.4)
NT PRO BNP: 571 PG/ML
POTASSIUM SERPL-SCNC: 3.5 MMOL/L (ref 3.5–5.1)
SODIUM SERPL-SCNC: 140 MMOL/L (ref 136–145)
SPECIMEN HOLD: NORMAL

## 2025-03-23 PROCEDURE — 6360000002 HC RX W HCPCS: Performed by: INTERNAL MEDICINE

## 2025-03-23 PROCEDURE — 83880 ASSAY OF NATRIURETIC PEPTIDE: CPT

## 2025-03-23 PROCEDURE — 2500000003 HC RX 250 WO HCPCS: Performed by: FAMILY MEDICINE

## 2025-03-23 PROCEDURE — 6370000000 HC RX 637 (ALT 250 FOR IP): Performed by: STUDENT IN AN ORGANIZED HEALTH CARE EDUCATION/TRAINING PROGRAM

## 2025-03-23 PROCEDURE — 6370000000 HC RX 637 (ALT 250 FOR IP): Performed by: INTERNAL MEDICINE

## 2025-03-23 PROCEDURE — 2500000003 HC RX 250 WO HCPCS: Performed by: INTERNAL MEDICINE

## 2025-03-23 PROCEDURE — 6360000002 HC RX W HCPCS: Performed by: STUDENT IN AN ORGANIZED HEALTH CARE EDUCATION/TRAINING PROGRAM

## 2025-03-23 PROCEDURE — 83735 ASSAY OF MAGNESIUM: CPT

## 2025-03-23 PROCEDURE — 80048 BASIC METABOLIC PNL TOTAL CA: CPT

## 2025-03-23 RX ORDER — METOPROLOL SUCCINATE 50 MG/1
50 TABLET, EXTENDED RELEASE ORAL DAILY
Qty: 30 TABLET | Refills: 3 | Status: SHIPPED | OUTPATIENT
Start: 2025-03-24

## 2025-03-23 RX ORDER — AMIODARONE HYDROCHLORIDE 200 MG/1
200 TABLET ORAL 2 TIMES DAILY
Qty: 60 TABLET | Refills: 1 | Status: SHIPPED | OUTPATIENT
Start: 2025-03-23

## 2025-03-23 RX ORDER — FUROSEMIDE 10 MG/ML
40 INJECTION INTRAMUSCULAR; INTRAVENOUS 2 TIMES DAILY
Status: DISCONTINUED | OUTPATIENT
Start: 2025-03-23 | End: 2025-03-23 | Stop reason: HOSPADM

## 2025-03-23 RX ORDER — SPIRONOLACTONE 25 MG/1
25 TABLET ORAL DAILY
Qty: 30 TABLET | Refills: 3 | Status: SHIPPED | OUTPATIENT
Start: 2025-03-24

## 2025-03-23 RX ORDER — LOSARTAN POTASSIUM 25 MG/1
25 TABLET ORAL DAILY
Qty: 30 TABLET | Refills: 3 | Status: SHIPPED | OUTPATIENT
Start: 2025-03-24

## 2025-03-23 RX ORDER — FUROSEMIDE 40 MG/1
TABLET ORAL
Qty: 76 TABLET | Refills: 1 | Status: SHIPPED | OUTPATIENT
Start: 2025-03-24 | End: 2025-04-27

## 2025-03-23 RX ADMIN — SODIUM CHLORIDE, PRESERVATIVE FREE 10 ML: 5 INJECTION INTRAVENOUS at 12:15

## 2025-03-23 RX ADMIN — SPIRONOLACTONE 25 MG: 25 TABLET ORAL at 09:19

## 2025-03-23 RX ADMIN — FUROSEMIDE 40 MG: 10 INJECTION, SOLUTION INTRAMUSCULAR; INTRAVENOUS at 14:27

## 2025-03-23 RX ADMIN — FUROSEMIDE 40 MG: 10 INJECTION, SOLUTION INTRAMUSCULAR; INTRAVENOUS at 09:19

## 2025-03-23 RX ADMIN — METOPROLOL SUCCINATE 50 MG: 50 TABLET, FILM COATED, EXTENDED RELEASE ORAL at 09:19

## 2025-03-23 RX ADMIN — MORPHINE SULFATE 2 MG: 2 INJECTION, SOLUTION INTRAMUSCULAR; INTRAVENOUS at 09:19

## 2025-03-23 RX ADMIN — EMPAGLIFLOZIN 10 MG: 10 TABLET, FILM COATED ORAL at 12:14

## 2025-03-23 RX ADMIN — LOSARTAN POTASSIUM 25 MG: 50 TABLET, FILM COATED ORAL at 09:19

## 2025-03-23 RX ADMIN — AMIODARONE HYDROCHLORIDE 400 MG: 200 TABLET ORAL at 09:19

## 2025-03-23 RX ADMIN — ONDANSETRON 4 MG: 2 INJECTION, SOLUTION INTRAMUSCULAR; INTRAVENOUS at 09:19

## 2025-03-23 RX ADMIN — APIXABAN 5 MG: 5 TABLET, FILM COATED ORAL at 09:19

## 2025-03-23 ASSESSMENT — PAIN SCALES - GENERAL
PAINLEVEL_OUTOF10: 3
PAINLEVEL_OUTOF10: 7

## 2025-03-23 NOTE — PLAN OF CARE
Problem: Discharge Planning  Goal: Discharge to home or other facility with appropriate resources  3/23/2025 1046 by Azalia Salvador RN  Outcome: Progressing  3/23/2025 0210 by Juan Ramon Lanier RN  Outcome: Progressing     Problem: Safety - Adult  Goal: Free from fall injury  3/23/2025 1046 by Azalia Salvador RN  Outcome: Progressing  3/23/2025 0210 by Juan Ramon Lanier RN  Outcome: Progressing     Problem: Pain  Goal: Verbalizes/displays adequate comfort level or baseline comfort level  3/23/2025 1046 by Azalia Salvador RN  Outcome: Progressing  3/23/2025 0210 by Juan Ramon Lanier RN  Outcome: Progressing     Problem: ABCDS Injury Assessment  Goal: Absence of physical injury  3/23/2025 1046 by Azalia Salvador RN  Outcome: Progressing  3/23/2025 0210 by Juan Ramon Lanier RN  Outcome: Progressing

## 2025-03-23 NOTE — PROGRESS NOTES
[facility]                                                                                          Hospitalist Progress Note  Elsa Durant MD  Office Phone: [phone]        Date of Service:  3/21/2025  NAME:  Larry Samuel  :  1970  MRN:  [id number]       Admission Summary:   Larry Samuel is a 54 y.o. male with past medical history of chronic HFrEF, mitral regurgitation, tricuspid regurgitation, atrial fibrillation, long-term anticoagulation on Eliquis, status post cardioversion x 2, hypertension, CAD, NELIDA (obstructive sleep apnea), alcohol use disorder presented as a direct admission/transfer from [facility] ED to [facility] with chief complaint of chest pain, shortness of breath, dyspnea on exertion, fatigue.     He was transferred to [facility] for evaluation for possible JOSELINE/DCCV.       Interval history / Subjective:   Patient evaluated on morning rounds. He is eager to leave. Discussed weaning oxygen and improving respiratory status.      Assessment & Plan:        Atrial flutter with rapid ventricular response (HCC), resolved, now in NSR   -s/p successful JOSELINE/DCCV 3/20   - s/p amio gtt, transitioned to po, will decrease to amio 200mg BID at time of discharge   - Eliquis 5mg BID indefinitely   - Appreciate Cards     Acute chest pain, resolved   -Possibly 2/2 to above      Acute respiratory failure with hypoxia  SOB (shortness of breath) on exertion  Pulmonary edema   -Continues to require 2L NC to maintain saturations  - CXR with pulmonary edema   - Continue diuresis with lasix   - Added aldactone per Cardiology   -Monitor BP with diuresis   - COVID/Flu negative     Acute on chronic HFrEF (heart failure reduced ejection fraction)       Mitral regurgitation       Tricuspid regurgitation  -proBNP 1474  -Last TTE 3/19/2025: EF 20% to 25%; moderate to severe mitral regurgitation, mild tricuspid regurgitation severe global hypokinesis  - 
        [facility]                                                                                          Hospitalist Progress Note  Elsa Durant MD  Office Phone: [phone]        Date of Service:  3/20/2025  NAME:  Larry Samuel  :  1970  MRN:  [id number]       Admission Summary:   Larry Samuel is a 54 y.o. male with past medical history of chronic HFrEF, mitral regurgitation, tricuspid regurgitation, atrial fibrillation, long-term anticoagulation on Eliquis, status post cardioversion x 2, hypertension, CAD, NELIDA (obstructive sleep apnea), alcohol use disorder presented as a direct admission/transfer from [facility] ED to [facility] with chief complaint of chest pain, shortness of breath, dyspnea on exertion, fatigue.     He was transferred to [facility] for evaluation for possible JOSELINE/DCCV.       Interval history / Subjective:   Patient evaluated on morning rounds. Feels well. Currently NPO. Reports cough and chest pain, inquires about next morphine dosing.      Assessment & Plan:        Atrial flutter with rapid ventricular response (HCC)  -Plan for JOSELINE/DCCV this afternoon   -Resume Amiodarone continuous IV infusion @ 1 mg/min  -s/p metoprolol 5 mg IV x 2  -[id number] = 2 (CHF = 1; HTN = 1)  -Continue anticoagulation with eliquis      Acute chest pain  -Morphine 2 mg IV every 4 hours as needed  -Continue oxygen  -Nitroglycerin 0.4 mg sublingual as needed  -As patient already on Eliquis, aspirin not ordered  - Wean as tolerated      Acute respiratory failure with hypoxia  SOB (shortness of breath) on exertion  Pulmonary edema   -Reportedly O2 saturation decreased to 86% on room air prior to transfer  -Currently on 3 L O2 nasal cannula  -Titrate O2 to keep O2 saturation greater than 94%  -Continuous pulse oximetry monitoring  - CXR with pulmonary edema   - Lasix 40mg IV x 1 dose now  - COVID/Flu negative     Acute on chronic HFrEF (heart 
  Physician Progress Note      PATIENT:               POONAM ZIEGLER  [facility] #:                  [id number]  :                       1970  ADMIT DATE:       3/19/2025 8:15 PM  DISCH DATE:  RESPONDING  PROVIDER #:        Elsa Durant MD          QUERY TEXT:    Patient admitted with SOB, noted to have *** (type if known) atrial   fibrillation and is maintained on *** (anticoagulation medication specified).   If possible, please document in progress notes and discharge summary if you   are evaluating and/or treating any of the following:?  ?  The medical record reflects the following:  Risk Factors: AFib on Eliquis    Clinical Indicators: chest pain, shortness of breath, dyspnea on exertion,   fatigue  -per PNs 3/20 \"[id number] = 2 (CHF = 1; HTN = 1)\"  - Initial 12-lead EKG at 0905 hrs showed atrial flutter, nonspecific ST/T wave   changes at 98 bpm.  -Repeat 12-lead EKG at 1316 hrs showed atrial flutter, ST/T wave changes in   the anterior leads 113 bpm.    Treatment: started on amiodarone continuous IV gtt  -Continue anticoagulation with Eliquis    Please call if you have any questions or need assistance. I can also be   reached via [employer] or [employer] # [phone].  Thank you,  Amber Moran RN/CDI  Options provided:  -- Secondary hypercoagulable state in a patient with atrial fibrillation  -- Other - I will add my own diagnosis  -- Disagree - Not applicable / Not valid  -- Disagree - Clinically unable to determine / Unknown  -- Refer to Clinical Documentation Reviewer    PROVIDER RESPONSE TEXT:    This patient has secondary hypercoagulable state in a patient with atrial   fibrillation.    Query created by: Amber Moran on 3/21/2025 2:21 PM      Electronically signed by:  Elsa Durant MD 3/21/2025 2:24 PM          
  TRANSFER - IN REPORT:    Verbal report received from Floor RN (name) on Larry Samuel  being received from IMCU(unit) for ordered procedure      Report consisted of patient’s Situation, Background, Assessment and   Recommendations(SBAR).     Information from the following report(s) Adult Overview, Intake/Output, Recent Results, and Cardiac Rhythm A. Flutter  was reviewed with the receiving nurse.    Opportunity for questions and clarification was provided.      Assessment completed upon patient’s arrival to unit and care assume        
 His initial O2  saturation on RA was 96  % and his baseline heart rate was  80 BPM.  His post  O2  saturation on RA was 90  % and his post walk heart rate was 75  BPM. Pt O2 saturation on RA dropped while walking to 88% but quickly recovered to 91-92%.         
 Larry Samuel underwent a 6 min walk test. His initial O2  saturation was 99  % and his baseline heart rate was 85  BPM. He walked from room to door at a distance of 45ft . His post  O2  saturation was 89  % and his post walk heart rate was 88  BPM- on room air     
1320: Patient left unit for cardioversion with running amiodarone infusion. Ultimately stopped for procedure after care was assumed by ALAYNA Link.   1515: Patient arrived back on IMCU without running amiodarone infusion despite active order. Verified with MD Durant that amio is to remain stopped as cardioversion was successful and PO will start tonight 2100.   
Cardiology Progress Note                                        Admit Date: 3/19/2025    Assessment/Plan:     Cardiomyopathy; EF is 30 to 35%; added Jardiance  HTN; will decrease BB some to allow GDMT  PAF resolved after DCCV; when he goes home, will decreased Amiodarone to 200 mg BID; he needs to stay on Eliquis 5 mg BID rest of his life  CHF;acute on chronic systolic; cont IV diuretic    Larry Samuel is a 54 y.o. male with     PROBLEM LIST:  Patient Active Problem List    Diagnosis Date Noted    Atrial fibrillation with RVR ([facility]) 03/20/2025    Rapid atrial fibrillation (HCC) 03/19/2025    Atrial flutter with rapid ventricular response ([facility]) 03/19/2025    A-fib ([facility]) 02/20/2023    NELIDA (obstructive sleep apnea) 02/20/2023    HTN (hypertension) 02/20/2023    Diastolic heart failure (HCC) 02/07/2023    Atrial flutter ([facility]) 02/06/2023         Subjective:     Larry Samuel reports none.    BP (!) 132/101   Pulse 78   Temp 97.6 °F (36.4 °C) (Oral)   Resp 20   Ht 1.88 m (6' 2\")   Wt 125.9 kg (277 lb 9 oz)   SpO2 100%   BMI 35.64 kg/m²     Intake/Output Summary (Last 24 hours) at 3/23/2025 1101  Last data filed at 3/22/2025 2015  Gross per 24 hour   Intake 600 ml   Output --   Net 600 ml       Objective:      Physical Exam:  HEENT: Perrla, EOMI  Neck: No JVD,  No thyroidmegaly  Resp: some rales  CV: RRR s1s2 No murmur no s3  Abd:Soft, Nontender  Ext: 1+ edema  Neuro: Alert and oriented; Nonfocal  Skin: Warm, Dry, Intact  Pulses: 2+ DP/PT/Rad      Telemetry: normal sinus rhythm    Current Facility-Administered Medications   Medication Dose Route Frequency    ipratropium 0.5 mg-albuterol 2.5 mg (DUONEB) nebulizer solution 1 Dose  1 Dose Inhalation Q4H PRN    furosemide (LASIX) injection 40 mg  40 mg IntraVENous BID    metoprolol succinate (TOPROL XL) extended release tablet 50 mg  50 mg Oral Daily    losartan (COZAAR) tablet 25 mg  25 mg Oral Daily    spironolactone (ALDACTONE) tablet 25 mg  25 mg Oral Daily    0.9 
Cardiology Progress Note                                        Admit Date: 3/19/2025    Assessment/Plan:     Cardiomyopathy; EF is 30 to 35%; cont current regimen;   HTN; will decrease BB some to allow GDMT  PAF resolved after DCCV; when he goes home, will decreased Amiodarone to 200 mg BID; he needs to stay on Eliquis 5 mg BID rest of his life  CHF;acute on chronic systolic; cont IV diuretic    Larry Samuel is a 54 y.o. male with     PROBLEM LIST:  Patient Active Problem List    Diagnosis Date Noted    Atrial fibrillation with RVR ([facility]) 03/20/2025    Rapid atrial fibrillation (HCC) 03/19/2025    Atrial flutter with rapid ventricular response ([facility]) 03/19/2025    A-fib ([facility]) 02/20/2023    NELIDA (obstructive sleep apnea) 02/20/2023    HTN (hypertension) 02/20/2023    Diastolic heart failure ([facility]) 02/07/2023    Atrial flutter ([facility]) 02/06/2023         Subjective:     Larry Samuel reports none.    /88   Pulse 76   Temp 97.5 °F (36.4 °C) (Temporal)   Resp 14   Ht 1.88 m (6' 2\")   Wt 115.5 kg (254 lb 10.1 oz)   SpO2 93%   BMI 32.69 kg/m²     Intake/Output Summary (Last 24 hours) at 3/22/2025 1448  Last data filed at 3/22/2025 1100  Gross per 24 hour   Intake 205 ml   Output 825 ml   Net -620 ml       Objective:      Physical Exam:  HEENT: Perrla, EOMI  Neck: No JVD,  No thyroidmegaly  Resp: some rales  CV: RRR s1s2 No murmur no s3  Abd:Soft, Nontender  Ext: 1+ edema  Neuro: Alert and oriented; Nonfocal  Skin: Warm, Dry, Intact  Pulses: 2+ DP/PT/Rad      Telemetry: normal sinus rhythm    Current Facility-Administered Medications   Medication Dose Route Frequency    ipratropium 0.5 mg-albuterol 2.5 mg (DUONEB) nebulizer solution 1 Dose  1 Dose Inhalation Q4H PRN    ipratropium 0.5 mg-albuterol 2.5 mg (DUONEB) nebulizer solution 1 Dose  1 Dose Inhalation Once    furosemide (LASIX) injection 40 mg  40 mg IntraVENous Once    metoprolol succinate (TOPROL XL) extended release tablet 50 mg  50 mg Oral Daily    
Cardiology Progress Note                                        Admit Date: 3/19/2025    Assessment/Plan:     Cardiomyopathy; EF is 30 to 35%; will start GDMT but I do not think he will be compliant to Entresto or his BP will tolerate it; will start Losartan 25 mg andd Spironolactone 25 mg   HTN; will decrease BB some to allow GDMT  PAF resolved after DCCV; when he goes home, will decreased Amiodarone to 200 mg BID; he needs to stay on Eliquis 5 mg BID rest of his life  CHF;acute on chronic systolic; will need some PO Lasix as well    Larry Samuel is a 54 y.o. male with     PROBLEM LIST:  Patient Active Problem List    Diagnosis Date Noted    Atrial fibrillation with RVR ([facility]) 03/20/2025    Rapid atrial fibrillation ([facility]) 03/19/2025    Atrial flutter with rapid ventricular response ([facility]) 03/19/2025    A-fib (HCC) 02/20/2023    NELIDA (obstructive sleep apnea) 02/20/2023    HTN (hypertension) 02/20/2023    Diastolic heart failure (HCC) 02/07/2023    Atrial flutter ([facility]) 02/06/2023         Subjective:     Larry Samuel reports none.    /82   Pulse 78   Temp 97.6 °F (36.4 °C) (Oral)   Resp 29   Ht 1.88 m (6' 2\")   Wt 115.5 kg (254 lb 10.1 oz)   SpO2 92%   BMI 32.69 kg/m²     Intake/Output Summary (Last 24 hours) at 3/21/2025 1355  Last data filed at 3/21/2025 1105  Gross per 24 hour   Intake 400 ml   Output 1050 ml   Net -650 ml       Objective:      Physical Exam:  HEENT: Perrla, EOMI  Neck: No JVD,  No thyroidmegaly  Resp: some rales  CV: RRR s1s2 No murmur no s3  Abd:Soft, Nontender  Ext: 1+ edema  Neuro: Alert and oriented; Nonfocal  Skin: Warm, Dry, Intact  Pulses: 2+ DP/PT/Rad      Telemetry: normal sinus rhythm    Current Facility-Administered Medications   Medication Dose Route Frequency    metoprolol succinate (TOPROL XL) extended release tablet 100 mg  100 mg Oral Daily    0.9 % sodium chloride infusion   IntraVENous PRN    acetaminophen (TYLENOL) tablet 650 mg  650 mg Oral Q6H PRN    Or    
Hospital follow-up PCP transitional care appointment has been scheduled with Martha Sanabria for Wednesday, April 2nd, 2025 at 9:00a.  Pending patient discharge.  Radha Jaramillo, Care Management Assistant   
Patient brought in by stretcher from [facility] at 2010. Patient AxOx4. Pleasant. C/O chest tightness 8/10 and reports SOB. O2 on room air was 86%. Placed on 3L NC. Patient reports using a home cpap machine at night, no O2 @ at baseline during the day. /84, , O2 99%, RR 20, denies pain. Patient came in on amio dtt at 1mg/min (33.3ml/hr).   
Patient refusing to wear cpap. Family is suppose to bring in home machine.   
Patient states he can not tolerate hospital's CPAP machine. CPAP placed on standby and NC applied to patient.  
Patients shoes were found in the patients room PCU4 after discharge.  I contacted the /distribution at [facility] and requested they be couriered over to the patients room at [facility].  They requested I leave them in the lab for the  to  so that is what I did.    
Pulse oximetry assessment by RN in room (not 6 min walk)  94% at rest on room air (if 88% or less, skip next steps)  85% while standing on room air  97% at rest on 2LPM  92% while standing on 3LPM     
TRANSFER - OUT REPORT:    Verbal report given to ALAYNA Carson(name) on Larry Samuel being transferred to [facility](unit) for routine progression of patient care       Report consisted of patient's Situation, Background, Assessment and   Recommendations(SBAR).     Information from the following report(s) Adult Overview, Intake/Output, Recent Results, and Cardiac Rhythm SR  was reviewed with the receiving nurse.    Opportunity for questions and clarification was provided.      Patient transported with:   Monitor  Registered Nurse    
Inhalation Q4H PRN    metoprolol succinate (TOPROL XL) extended release tablet 50 mg  50 mg Oral Daily    losartan (COZAAR) tablet 25 mg  25 mg Oral Daily    spironolactone (ALDACTONE) tablet 25 mg  25 mg Oral Daily    0.9 % sodium chloride infusion   IntraVENous PRN    acetaminophen (TYLENOL) tablet 650 mg  650 mg Oral Q6H PRN    Or    acetaminophen (TYLENOL) suppository 650 mg  650 mg Rectal Q6H PRN    apixaban (ELIQUIS) tablet 5 mg  5 mg Oral BID    LORazepam (ATIVAN) tablet 1 mg  1 mg Oral Q4H PRN    magnesium sulfate 2000 mg in 50 mL IVPB premix  2,000 mg IntraVENous PRN    morphine (PF) injection 2 mg  2 mg IntraVENous Q2H PRN    naloxone (NARCAN) injection 0.4 mg  0.4 mg IntraVENous PRN    nitroGLYCERIN (NITROSTAT) SL tablet 0.4 mg  0.4 mg SubLINGual Q5 Min PRN    ondansetron (ZOFRAN-ODT) disintegrating tablet 4 mg  4 mg Oral Q8H PRN    Or    ondansetron (ZOFRAN) injection 4 mg  4 mg IntraVENous Q6H PRN    polyethylene glycol (GLYCOLAX) packet 17 g  17 g Oral Daily PRN    potassium chloride (KLOR-CON) extended release tablet 40 mEq  40 mEq Oral PRN    Or    potassium bicarb-citric acid (EFFER-K) effervescent tablet 40 mEq  40 mEq Oral PRN    Or    potassium chloride 10 mEq/100 mL IVPB (Peripheral Line)  10 mEq IntraVENous PRN    sodium chloride flush 0.9 % injection 5-40 mL  5-40 mL IntraVENous 2 times per day    sodium chloride flush 0.9 % injection 5-40 mL  5-40 mL IntraVENous PRN    amiodarone (CORDARONE) tablet 400 mg  400 mg Oral BID    sodium chloride flush 0.9 % injection 5-40 mL  5-40 mL IntraVENous 2 times per day    sodium chloride flush 0.9 % injection 5-40 mL  5-40 mL IntraVENous PRN     ______________________________________________________________________  EXPECTED LENGTH OF STAY: 3  ACTUAL LENGTH OF STAY:          3                 Elsa Durant MD     CRITICAL CARE ATTESTATION:  I had a face to face encounter with the patient, reviewed and interpreted patient data including clinical events,

## 2025-03-23 NOTE — PLAN OF CARE
3/19/2025   Larry Samuel       03/23/25      To Whom It May Concern,    Larry Samuel  was admitted to [facility] from 3/19/2025  to 03/23/25 . He is suitable to return to work on 3/27/25 at previous level of duty.     Please call with questions or concerns.     Best,  Dr Elsa Durant         [facility]   Phone [phone]  Fax [phone] [address] [place] [address]

## 2025-03-24 ENCOUNTER — TELEPHONE (OUTPATIENT)
Age: 55
End: 2025-03-24

## 2025-03-24 NOTE — TELEPHONE ENCOUNTER
Care Transitions Initial Follow Up Call    Outreach made within 2 business days of discharge: Yes    Patient: Larry Samuel Patient : 1970   MRN: [id number]  Reason for Admission:   Atrial flutter with rapid ventricular response (HCC), resolved, now in NSR      Discharge Date: 3/23/25       Spoke with: Patient     Discharge department/facility: [facility] Interactive Patient Contact:  Was patient able to fill all prescriptions: Yes  Was patient instructed to bring all medications to the follow-up visit: Yes  Is patient taking all medications as directed in the discharge summary? Yes  Does patient understand their discharge instructions: Yes  Does patient have questions or concerns that need addressed prior to 7-14 day follow up office visit: no    Additional needs identified to be addressed with provider  No needs identified             Scheduled appointment with PCP within 7-14 days    Follow Up  Future Appointments   Date Time Provider Department Center   2025  9:00 AM Martha Sanabria APRN - NP [facility]       Lana Medrano LPN

## 2025-04-02 ENCOUNTER — OFFICE VISIT (OUTPATIENT)
Age: 55
End: 2025-04-02

## 2025-04-02 VITALS
WEIGHT: 253 LBS | RESPIRATION RATE: 16 BRPM | OXYGEN SATURATION: 98 % | HEART RATE: 78 BPM | SYSTOLIC BLOOD PRESSURE: 130 MMHG | BODY MASS INDEX: 32.47 KG/M2 | DIASTOLIC BLOOD PRESSURE: 88 MMHG | HEIGHT: 74 IN

## 2025-04-02 DIAGNOSIS — Z12.5 SCREENING PSA (PROSTATE SPECIFIC ANTIGEN): ICD-10-CM

## 2025-04-02 DIAGNOSIS — I10 ESSENTIAL (PRIMARY) HYPERTENSION: ICD-10-CM

## 2025-04-02 DIAGNOSIS — R73.03 PREDIABETES: ICD-10-CM

## 2025-04-02 DIAGNOSIS — Z13.220 LIPID SCREENING: ICD-10-CM

## 2025-04-02 DIAGNOSIS — I48.91 ATRIAL FIBRILLATION, UNSPECIFIED TYPE (HCC): ICD-10-CM

## 2025-04-02 DIAGNOSIS — Z78.9 TRANSITION OF CARE: Primary | ICD-10-CM

## 2025-04-02 DIAGNOSIS — I50.33 ACUTE ON CHRONIC DIASTOLIC HEART FAILURE (HCC): ICD-10-CM

## 2025-04-02 DIAGNOSIS — Z78.9 TRANSITION OF CARE: ICD-10-CM

## 2025-04-02 DIAGNOSIS — M51.369 DEGENERATION OF INTERVERTEBRAL DISC OF LUMBAR REGION WITHOUT DISCOGENIC BACK PAIN OR LOWER EXTREMITY PAIN: ICD-10-CM

## 2025-04-02 RX ORDER — CYCLOBENZAPRINE HCL 10 MG
10 TABLET ORAL 3 TIMES DAILY PRN
Qty: 30 TABLET | Refills: 0 | Status: SHIPPED | OUTPATIENT
Start: 2025-04-02 | End: 2025-05-02

## 2025-04-02 ASSESSMENT — ENCOUNTER SYMPTOMS
GASTROINTESTINAL NEGATIVE: 1
RESPIRATORY NEGATIVE: 1

## 2025-04-02 NOTE — PROGRESS NOTES
Subjective    Larry Samuel is a 54 y.o. male who presents today for the following:  Chief Complaint   Patient presents with    Follow-Up from Hospital    Atrial Fibrillation       History of Present Illness  The patient presents for a 2-week follow-up visit after hospitalization. Admitted 3/19-3/23 and was called by nurse within 2 days.     He experienced dyspnea on exertion, specifically after walking approximately 12 steps, which persisted for the past 3 weeks. This led to his decision to seek emergency care. He had a consultation with [facility] Marrero regarding his blood pressure issues in the last few weeks. A brief interruption in his metoprolol regimen, missing a week's worth of doses, occurred but the medication was resumed upon refilling his prescription. Despite abstaining from alcohol since 12/2024, no improvement in symptoms was observed. Dr. Rodriguez informed him that his medication would need to be restarted each time it was discontinued.    In the emergency department, he was found to be in atrial flutter with rapid ventricular response (RVR). A transesophageal echocardiogram (JOSELINE) and direct current cardioversion (DCCV) were performed on 03/20/2025. He was initially placed on an amiodarone drip and later transitioned to amiodarone 200 mg twice daily. Eliquis 5 mg twice daily was also restarted and is to be continued indefinitely due to his cardiac history, which includes atrial fibrillation, tricuspid regurgitation, coronary artery disease, and hypertension. Acute respiratory failure with hypoxia and pulmonary edema were diagnosed, necessitating the use of 2 L of nasal cannula oxygen. Lasix IV was administered to reduce pulmonary edema, and he was later transitioned to twice-daily dosing. Aldactone was added per cardiology recommendations. COVID-19 and flu tests were negative. A CT scan confirmed pulmonary edema and acute on chronic heart failure with mild mitral and tricuspid regurgitation. His ejection

## 2025-04-02 NOTE — PROGRESS NOTES
Chief Complaint   Patient presents with    Follow-Up from Hospital    Atrial Fibrillation     \"Have you been to the ER, urgent care clinic since your last visit?  Hospitalized since your last visit?\"    [facility]     “Have you seen or consulted any other health care providers outside our system since your last visit?”    NO      “Have you had a colorectal cancer screening such as a colonoscopy/FIT/Cologuard?    NO    No colonoscopy on file  No cologuard on file  No FIT/FOBT on file   No flexible sigmoidoscopy on file

## 2025-04-11 NOTE — PROGRESS NOTES
Physician Progress Note      PATIENT:               POONAM ZIEGLER  CSN #:                  [id number]  :                       1970  ADMIT DATE:       3/19/2025 3:03 AM  DISCH DATE:        3/19/2025 7:31 PM  RESPONDING  PROVIDER #:        Grzegorz Pete MD          QUERY TEXT:    Pt admitted with shortness of breath and chest tightness and has CHF   documented. If possible, please document in progress notes and discharge   summary further specificity regarding the type and acuity of CHF:      The medical record reflects the following:    Risk Factors: hypertension, smoking, CAD    Clinical Indicators:    ED3/19 Clinical impression: Acute congestive heart failure, unspecified heart   failure type    H&P     -compensated, pro-bnp 1474, cxr shows mild congestion    -fluid restriction, strict I&O, goal K>4, Mg>2    -lasix/asa/statin/metoprolol    Consult 3/19 CHF onset as early as .    PN 3/19 Likely Acute congestive heart failure        DS 3/19 HFrEF        -likely deconditioned from continued afib, cardioversion will likely improve   heart function    -echo:  EF 20 - 25%. Left ventricle size is normal. Mild septal   thickening.Septal flattening in systole consistent with right ventricular   pressure overload. Severe global hypokinesis present. Mod-severe MR    Treatment: lasix, asa, statin, metoprolol  Options provided:  -- Acute Systolic CHF/HFrEF  -- Acute Systolic and Diastolic CHF  -- Chronic Systolic CHF/HFrEF  -- Chronic Systolic and Diastolic CHF  -- Other - I will add my own diagnosis  -- Disagree - Not applicable / Not valid  -- Disagree - Clinically unable to determine / Unknown  -- Refer to Clinical Documentation Reviewer    PROVIDER RESPONSE TEXT:    This patient is in acute systolic CHF/HFrEF.    Query created by: Markell Vargas on 3/26/2025 12:58 PM      Electronically signed by:  Grzegorz Pete MD 2025 1:37 PM

## 2025-07-29 ENCOUNTER — TELEPHONE (OUTPATIENT)
Age: 55
End: 2025-07-29

## 2025-07-29 NOTE — TELEPHONE ENCOUNTER
Spoke to patient regarding recent ER visit at [facility]  Pt transported from his job by ambulance  Pt reports dizzy spell, not convinced it could be from heat  (pt not diabetic)  Pt concerned about kidney function  No open appointments  Please advise

## 2025-07-29 NOTE — TELEPHONE ENCOUNTER
Pt would like to speak with a Nurse/PCP regarding Kidney count being down & Heat Exposure; Please Advise, already attempted to schedule an appt with Pt & wasn't succesful

## 2025-07-30 ENCOUNTER — HOSPITAL ENCOUNTER (EMERGENCY)
Facility: HOSPITAL | Age: 55
Discharge: HOME | End: 2025-07-30
Attending: EMERGENCY MEDICINE
Payer: COMMERCIAL

## 2025-07-30 ENCOUNTER — TELEPHONE (OUTPATIENT)
Age: 55
End: 2025-07-30

## 2025-07-30 VITALS
WEIGHT: 250.5 LBS | OXYGEN SATURATION: 96 % | BODY MASS INDEX: 32.15 KG/M2 | DIASTOLIC BLOOD PRESSURE: 105 MMHG | SYSTOLIC BLOOD PRESSURE: 149 MMHG | RESPIRATION RATE: 18 BRPM | TEMPERATURE: 98.2 F | HEART RATE: 67 BPM | HEIGHT: 74 IN

## 2025-07-30 DIAGNOSIS — I16.0 HYPERTENSIVE URGENCY: Primary | ICD-10-CM

## 2025-07-30 LAB
AMPHET UR QL SCN: NEGATIVE
BARBITURATES UR QL SCN: NEGATIVE
BENZODIAZ UR QL: POSITIVE
CANNABINOIDS UR QL SCN: NEGATIVE
COCAINE UR QL SCN: NEGATIVE
FENTANYL: NEGATIVE
Lab: ABNORMAL
METHADONE UR QL: NEGATIVE
OPIATES UR QL: NEGATIVE
PCP UR QL: NEGATIVE

## 2025-07-30 PROCEDURE — 99283 EMERGENCY DEPT VISIT LOW MDM: CPT

## 2025-07-30 PROCEDURE — 6370000000 HC RX 637 (ALT 250 FOR IP): Performed by: EMERGENCY MEDICINE

## 2025-07-30 RX ORDER — LOSARTAN POTASSIUM 25 MG/1
50 TABLET ORAL ONCE
Status: COMPLETED | OUTPATIENT
Start: 2025-07-30 | End: 2025-07-30

## 2025-07-30 RX ORDER — CLONIDINE HYDROCHLORIDE 0.1 MG/1
0.2 TABLET ORAL
Status: COMPLETED | OUTPATIENT
Start: 2025-07-30 | End: 2025-07-30

## 2025-07-30 RX ORDER — CLONIDINE HYDROCHLORIDE 0.1 MG/1
0.1 TABLET ORAL 2 TIMES DAILY
Qty: 30 TABLET | Refills: 0 | Status: SHIPPED | OUTPATIENT
Start: 2025-07-30 | End: 2025-08-14

## 2025-07-30 RX ADMIN — CLONIDINE HYDROCHLORIDE 0.2 MG: 0.1 TABLET ORAL at 10:07

## 2025-07-30 RX ADMIN — LOSARTAN POTASSIUM 50 MG: 25 TABLET, FILM COATED ORAL at 10:07

## 2025-07-30 ASSESSMENT — LIFESTYLE VARIABLES
HOW MANY STANDARD DRINKS CONTAINING ALCOHOL DO YOU HAVE ON A TYPICAL DAY: PATIENT DOES NOT DRINK
HOW OFTEN DO YOU HAVE A DRINK CONTAINING ALCOHOL: NEVER

## 2025-07-30 ASSESSMENT — ENCOUNTER SYMPTOMS: SHORTNESS OF BREATH: 0

## 2025-07-30 ASSESSMENT — PAIN - FUNCTIONAL ASSESSMENT: PAIN_FUNCTIONAL_ASSESSMENT: NONE - DENIES PAIN

## 2025-07-30 NOTE — ED NOTES
Pt presents ambulatory to the ED c/o hypertension with no symptoms. Pt states his daughter is a nurse and took his BP last night and got a reading of 170/110. Pt states he was also seen in an ER on Monday for pressure behind L eye nad dizziness however is BP was normal then. Pt has hx of HTN and afib and is compliant with medications. Last doses taken yesterday.     Emergency Department Nursing Plan of Care       The Nursing Plan of Care is developed from the Nursing assessment and Emergency Department Attending provider initial evaluation.  The plan of care may be reviewed in the “ED Provider note”.      The Plan of Care was developed with the following considerations:  Patient / Family readiness to learn indicated by:verbalized understanding  Persons(s) to be included in education: patient  Barriers to Learning/Limitations:None      Signed     LA NENA DELACRUZ RN    7/30/2025   9:38 AM

## 2025-07-30 NOTE — TELEPHONE ENCOUNTER
Pt called to follow up on previous encounter.   Pt was informed that the next available was next Wednesday. Pt declined and stated he will just go to the hospital.

## 2025-07-30 NOTE — DISCHARGE INSTRUCTIONS
It was a pleasure taking care of you at [facility] Emergency Department today.      We know that when you come to [facility], you are entrusting us with your health, comfort, and safety.  Our physicians and nurses honor that trust, and we appreciate the opportunity to care for you and your loved ones.  We also value your feedback, and we would like to hear from you.    When you receive a  >>> survey <<< about your Emergency Department experience today, please fill it out. We review every single response from our patients. Thank you!    Sincerely,  Dr. Donovan Moore MD

## 2025-07-30 NOTE — ED TRIAGE NOTES
Pt presents to ED with c/o high blood pressure. Pt reports hx of HTN, but has not taken meds today. Pt denies dizziness, headache, extremity numbness/weakness/tingling.

## 2025-07-30 NOTE — ED PROVIDER NOTES
EMERGENCY DEPARTMENT HISTORY AND PHYSICAL EXAM    Date: 7/30/2025  Patient Name: Larry Samuel  Patient Age and Sex: 54 y.o. male  MRN:  [id number]  CSN:  [id number]    History of Present Illness     Chief Complaint   Patient presents with    Hypertension       History Provided By: Patient    Ability to gather history was limited by:     HPI: Larry Samuel, 54 y.o. male   Complains of very high blood pressures, in the 200s/120 range intermittently for the last few days.  He has had some intermittent symptoms of feeling \" hot\" and heavy.  He denies any headache or dizziness or lightheadedness.  He has no chest pain or shortness of breath.  He reports that he has been taking his losartan and spironolactone and metoprolol and furosemide as prescribed.      Tobacco Use      Smoking status: Never      Smokeless tobacco: Never     Past History   The patient's medical, surgical, and social history were reviewed by me today.    Current Medications:  No current facility-administered medications on file prior to encounter.     Current Outpatient Medications on File Prior to Encounter   Medication Sig Dispense Refill    amiodarone (CORDARONE) 200 MG tablet Take 1 tablet by mouth 2 times daily 60 tablet 1    empagliflozin (JARDIANCE) 10 MG tablet Take 1 tablet by mouth daily 30 tablet 3    furosemide (LASIX) 40 MG tablet Take 1 tablet by mouth 2 times daily for 4 days, THEN 1 tablet daily. 76 tablet 1    losartan (COZAAR) 25 MG tablet Take 1 tablet by mouth daily 30 tablet 3    metoprolol succinate (TOPROL XL) 50 MG extended release tablet Take 1 tablet by mouth daily 30 tablet 3    spironolactone (ALDACTONE) 25 MG tablet Take 1 tablet by mouth daily 30 tablet 3    meloxicam (MOBIC) 15 MG tablet Take 1 tablet by mouth daily (Patient not taking: Reported on 7/30/2025) 30 tablet 3       Past Medical History:   Diagnosis Date    Hypertension        No past surgical history on file.    Social History     Tobacco Use    Smoking

## 2025-08-06 ENCOUNTER — OFFICE VISIT (OUTPATIENT)
Age: 55
End: 2025-08-06
Payer: COMMERCIAL

## 2025-08-06 VITALS
HEART RATE: 71 BPM | WEIGHT: 250.2 LBS | RESPIRATION RATE: 18 BRPM | BODY MASS INDEX: 32.11 KG/M2 | DIASTOLIC BLOOD PRESSURE: 82 MMHG | SYSTOLIC BLOOD PRESSURE: 122 MMHG | HEIGHT: 74 IN | OXYGEN SATURATION: 98 %

## 2025-08-06 DIAGNOSIS — R53.83 FATIGUE, UNSPECIFIED TYPE: ICD-10-CM

## 2025-08-06 DIAGNOSIS — N18.30 STAGE 3 CHRONIC KIDNEY DISEASE, UNSPECIFIED WHETHER STAGE 3A OR 3B CKD (HCC): ICD-10-CM

## 2025-08-06 DIAGNOSIS — I10 ESSENTIAL (PRIMARY) HYPERTENSION: Primary | ICD-10-CM

## 2025-08-06 DIAGNOSIS — E55.9 VITAMIN D DEFICIENCY: ICD-10-CM

## 2025-08-06 DIAGNOSIS — I48.91 ATRIAL FIBRILLATION, UNSPECIFIED TYPE (HCC): ICD-10-CM

## 2025-08-06 PROCEDURE — 99214 OFFICE O/P EST MOD 30 MIN: CPT | Performed by: INTERNAL MEDICINE

## 2025-08-06 PROCEDURE — 3074F SYST BP LT 130 MM HG: CPT | Performed by: INTERNAL MEDICINE

## 2025-08-06 PROCEDURE — 3079F DIAST BP 80-89 MM HG: CPT | Performed by: INTERNAL MEDICINE

## 2025-08-06 RX ORDER — NALTREXONE HYDROCHLORIDE 50 MG/1
50 TABLET, FILM COATED ORAL DAILY
COMMUNITY
Start: 2025-06-24 | End: 2025-07-24

## 2025-08-06 RX ORDER — DISULFIRAM 250 MG/1
250 TABLET ORAL DAILY
COMMUNITY
Start: 2025-07-20

## 2025-08-06 RX ORDER — DAPAGLIFLOZIN 10 MG/1
10 TABLET, FILM COATED ORAL EVERY MORNING
Qty: 90 TABLET | Refills: 1 | Status: SHIPPED | OUTPATIENT
Start: 2025-08-06

## 2025-08-06 RX ORDER — APIXABAN 5 MG/1
5 TABLET, FILM COATED ORAL 2 TIMES DAILY
COMMUNITY
Start: 2025-04-22

## 2025-08-06 RX ORDER — ONDANSETRON 4 MG/1
TABLET, FILM COATED ORAL
COMMUNITY

## 2025-08-14 ENCOUNTER — TELEPHONE (OUTPATIENT)
Age: 55
End: 2025-08-14

## 2025-08-15 ENCOUNTER — TELEPHONE (OUTPATIENT)
Age: 55
End: 2025-08-15

## 2025-08-19 ENCOUNTER — TELEPHONE (OUTPATIENT)
Age: 55
End: 2025-08-19

## 2025-08-19 DIAGNOSIS — N18.30 STAGE 3 CHRONIC KIDNEY DISEASE, UNSPECIFIED WHETHER STAGE 3A OR 3B CKD (HCC): ICD-10-CM

## 2025-08-20 DIAGNOSIS — I10 ESSENTIAL (PRIMARY) HYPERTENSION: ICD-10-CM

## 2025-08-20 DIAGNOSIS — I48.91 ATRIAL FIBRILLATION, UNSPECIFIED TYPE (HCC): Primary | ICD-10-CM

## 2025-08-20 DIAGNOSIS — N18.30 STAGE 3 CHRONIC KIDNEY DISEASE, UNSPECIFIED WHETHER STAGE 3A OR 3B CKD (HCC): ICD-10-CM

## 2025-08-25 ENCOUNTER — TELEPHONE (OUTPATIENT)
Age: 55
End: 2025-08-25

## 2025-08-25 DIAGNOSIS — N18.30 STAGE 3 CHRONIC KIDNEY DISEASE, UNSPECIFIED WHETHER STAGE 3A OR 3B CKD (HCC): ICD-10-CM

## 2025-08-25 DIAGNOSIS — I10 HYPERTENSION, UNSPECIFIED TYPE: Primary | ICD-10-CM

## 2025-08-25 DIAGNOSIS — I48.91 ATRIAL FIBRILLATION, UNSPECIFIED TYPE (HCC): ICD-10-CM

## 2025-08-25 RX ORDER — CLONIDINE HYDROCHLORIDE 0.1 MG/1
0.1 TABLET ORAL 2 TIMES DAILY
Qty: 60 TABLET | Refills: 0 | Status: SHIPPED | OUTPATIENT
Start: 2025-08-25 | End: 2025-09-24

## 2025-08-26 RX ORDER — FUROSEMIDE 20 MG/1
20 TABLET ORAL DAILY
Qty: 90 TABLET | Refills: 0 | Status: SHIPPED | OUTPATIENT
Start: 2025-08-26